# Patient Record
Sex: FEMALE | Race: WHITE | NOT HISPANIC OR LATINO | URBAN - METROPOLITAN AREA
[De-identification: names, ages, dates, MRNs, and addresses within clinical notes are randomized per-mention and may not be internally consistent; named-entity substitution may affect disease eponyms.]

---

## 2018-04-20 ENCOUNTER — OFFICE (AMBULATORY)
Dept: URBAN - METROPOLITAN AREA CLINIC 64 | Facility: CLINIC | Age: 58
End: 2018-04-20

## 2018-04-20 VITALS
HEART RATE: 96 BPM | SYSTOLIC BLOOD PRESSURE: 126 MMHG | HEIGHT: 64 IN | WEIGHT: 175 LBS | DIASTOLIC BLOOD PRESSURE: 87 MMHG

## 2018-04-20 DIAGNOSIS — K62.5 HEMORRHAGE OF ANUS AND RECTUM: ICD-10-CM

## 2018-04-20 DIAGNOSIS — K21.9 GASTRO-ESOPHAGEAL REFLUX DISEASE WITHOUT ESOPHAGITIS: ICD-10-CM

## 2018-04-20 DIAGNOSIS — R11.0 NAUSEA: ICD-10-CM

## 2018-04-20 DIAGNOSIS — R13.10 DYSPHAGIA, UNSPECIFIED: ICD-10-CM

## 2018-04-20 PROCEDURE — 99214 OFFICE O/P EST MOD 30 MIN: CPT | Performed by: NURSE PRACTITIONER

## 2018-04-20 RX ORDER — ESOMEPRAZOLE MAGNESIUM 40 MG/1
40 CAPSULE, DELAYED RELEASE ORAL
Qty: 180 | Refills: 3 | Status: COMPLETED
Start: 2018-04-20 | End: 2019-07-25

## 2018-05-07 ENCOUNTER — ON CAMPUS - OUTPATIENT (AMBULATORY)
Dept: URBAN - METROPOLITAN AREA HOSPITAL 2 | Facility: HOSPITAL | Age: 58
End: 2018-05-07

## 2018-05-07 VITALS
OXYGEN SATURATION: 95 % | WEIGHT: 174 LBS | DIASTOLIC BLOOD PRESSURE: 98 MMHG | HEART RATE: 72 BPM | OXYGEN SATURATION: 93 % | TEMPERATURE: 96.9 F | RESPIRATION RATE: 15 BRPM | SYSTOLIC BLOOD PRESSURE: 132 MMHG | HEART RATE: 62 BPM | SYSTOLIC BLOOD PRESSURE: 135 MMHG | HEART RATE: 85 BPM | RESPIRATION RATE: 18 BRPM | SYSTOLIC BLOOD PRESSURE: 147 MMHG | HEART RATE: 76 BPM | OXYGEN SATURATION: 99 % | RESPIRATION RATE: 16 BRPM | SYSTOLIC BLOOD PRESSURE: 112 MMHG | HEIGHT: 64 IN | OXYGEN SATURATION: 100 % | DIASTOLIC BLOOD PRESSURE: 92 MMHG | DIASTOLIC BLOOD PRESSURE: 66 MMHG | SYSTOLIC BLOOD PRESSURE: 114 MMHG | DIASTOLIC BLOOD PRESSURE: 80 MMHG

## 2018-05-07 DIAGNOSIS — K44.9 DIAPHRAGMATIC HERNIA WITHOUT OBSTRUCTION OR GANGRENE: ICD-10-CM

## 2018-05-07 DIAGNOSIS — K22.2 ESOPHAGEAL OBSTRUCTION: ICD-10-CM

## 2018-05-07 DIAGNOSIS — R13.10 DYSPHAGIA, UNSPECIFIED: ICD-10-CM

## 2018-05-07 DIAGNOSIS — K21.9 GASTRO-ESOPHAGEAL REFLUX DISEASE WITHOUT ESOPHAGITIS: ICD-10-CM

## 2018-05-07 PROCEDURE — 43235 EGD DIAGNOSTIC BRUSH WASH: CPT | Performed by: INTERNAL MEDICINE

## 2018-05-07 PROCEDURE — 43450 DILATE ESOPHAGUS 1/MULT PASS: CPT | Performed by: INTERNAL MEDICINE

## 2018-05-07 RX ADMIN — PROPOFOL: 10 INJECTION, EMULSION INTRAVENOUS at 07:58

## 2019-06-18 ENCOUNTER — OFFICE (AMBULATORY)
Dept: URBAN - METROPOLITAN AREA CLINIC 64 | Facility: CLINIC | Age: 59
End: 2019-06-18

## 2019-06-18 VITALS
HEART RATE: 76 BPM | WEIGHT: 163 LBS | DIASTOLIC BLOOD PRESSURE: 101 MMHG | SYSTOLIC BLOOD PRESSURE: 156 MMHG | HEIGHT: 64 IN

## 2019-06-18 DIAGNOSIS — R63.4 ABNORMAL WEIGHT LOSS: ICD-10-CM

## 2019-06-18 DIAGNOSIS — R13.19 OTHER DYSPHAGIA: ICD-10-CM

## 2019-06-18 DIAGNOSIS — R19.4 CHANGE IN BOWEL HABIT: ICD-10-CM

## 2019-06-18 DIAGNOSIS — K21.9 GASTRO-ESOPHAGEAL REFLUX DISEASE WITHOUT ESOPHAGITIS: ICD-10-CM

## 2019-06-18 PROCEDURE — 99214 OFFICE O/P EST MOD 30 MIN: CPT | Performed by: NURSE PRACTITIONER

## 2019-06-18 RX ORDER — RANITIDINE HYDROCHLORIDE 300 MG/1
300 TABLET, FILM COATED ORAL
Qty: 30 | Refills: 11 | Status: COMPLETED
Start: 2019-06-18 | End: 2019-07-25

## 2019-07-25 ENCOUNTER — OFFICE (AMBULATORY)
Dept: URBAN - METROPOLITAN AREA CLINIC 64 | Facility: CLINIC | Age: 59
End: 2019-07-25

## 2019-07-25 VITALS
HEIGHT: 64 IN | WEIGHT: 164 LBS | DIASTOLIC BLOOD PRESSURE: 91 MMHG | SYSTOLIC BLOOD PRESSURE: 166 MMHG | HEART RATE: 58 BPM

## 2019-07-25 DIAGNOSIS — R13.10 DYSPHAGIA, UNSPECIFIED: ICD-10-CM

## 2019-07-25 DIAGNOSIS — R10.32 LEFT LOWER QUADRANT PAIN: ICD-10-CM

## 2019-07-25 DIAGNOSIS — R13.19 OTHER DYSPHAGIA: ICD-10-CM

## 2019-07-25 DIAGNOSIS — D64.9 ANEMIA, UNSPECIFIED: ICD-10-CM

## 2019-07-25 DIAGNOSIS — K62.5 HEMORRHAGE OF ANUS AND RECTUM: ICD-10-CM

## 2019-07-25 PROCEDURE — 99214 OFFICE O/P EST MOD 30 MIN: CPT | Performed by: NURSE PRACTITIONER

## 2019-07-25 RX ORDER — RANITIDINE HYDROCHLORIDE 300 MG/1
300 TABLET, FILM COATED ORAL
Qty: 30 | Refills: 11 | Status: COMPLETED
Start: 2019-06-18 | End: 2019-07-25

## 2019-07-25 RX ORDER — METRONIDAZOLE 500 MG/1
1500 TABLET, FILM COATED ORAL
Qty: 42 | Refills: 0 | Status: COMPLETED
Start: 2019-07-25 | End: 2021-07-29

## 2019-07-25 RX ORDER — ESOMEPRAZOLE MAGNESIUM 40 MG/1
40 CAPSULE, DELAYED RELEASE ORAL
Qty: 180 | Refills: 3 | Status: COMPLETED
Start: 2018-04-20 | End: 2019-07-25

## 2019-07-25 RX ORDER — DEXLANSOPRAZOLE 60 MG/1
CAPSULE, DELAYED RELEASE ORAL
Qty: 0 | Refills: 0 | Status: COMPLETED
End: 2021-07-29

## 2019-08-01 ENCOUNTER — OFFICE (AMBULATORY)
Dept: URBAN - METROPOLITAN AREA PATHOLOGY 4 | Facility: PATHOLOGY | Age: 59
End: 2019-08-01

## 2019-08-01 ENCOUNTER — ON CAMPUS - OUTPATIENT (AMBULATORY)
Dept: URBAN - METROPOLITAN AREA HOSPITAL 2 | Facility: HOSPITAL | Age: 59
End: 2019-08-01

## 2019-08-01 ENCOUNTER — LAB REQUISITION (OUTPATIENT)
Dept: LAB | Facility: HOSPITAL | Age: 59
End: 2019-08-01

## 2019-08-01 VITALS
DIASTOLIC BLOOD PRESSURE: 77 MMHG | HEART RATE: 65 BPM | OXYGEN SATURATION: 97 % | HEART RATE: 71 BPM | SYSTOLIC BLOOD PRESSURE: 176 MMHG | HEART RATE: 62 BPM | DIASTOLIC BLOOD PRESSURE: 72 MMHG | HEART RATE: 66 BPM | SYSTOLIC BLOOD PRESSURE: 155 MMHG | DIASTOLIC BLOOD PRESSURE: 76 MMHG | SYSTOLIC BLOOD PRESSURE: 146 MMHG | DIASTOLIC BLOOD PRESSURE: 83 MMHG | OXYGEN SATURATION: 98 % | DIASTOLIC BLOOD PRESSURE: 81 MMHG | DIASTOLIC BLOOD PRESSURE: 73 MMHG | HEIGHT: 64 IN | TEMPERATURE: 98.2 F | DIASTOLIC BLOOD PRESSURE: 92 MMHG | SYSTOLIC BLOOD PRESSURE: 189 MMHG | SYSTOLIC BLOOD PRESSURE: 161 MMHG | RESPIRATION RATE: 16 BRPM | HEART RATE: 63 BPM | SYSTOLIC BLOOD PRESSURE: 177 MMHG | HEART RATE: 64 BPM | SYSTOLIC BLOOD PRESSURE: 158 MMHG | OXYGEN SATURATION: 96 % | SYSTOLIC BLOOD PRESSURE: 151 MMHG | DIASTOLIC BLOOD PRESSURE: 106 MMHG | RESPIRATION RATE: 18 BRPM | DIASTOLIC BLOOD PRESSURE: 87 MMHG | OXYGEN SATURATION: 100 % | SYSTOLIC BLOOD PRESSURE: 169 MMHG | HEART RATE: 60 BPM | HEART RATE: 96 BPM | WEIGHT: 161 LBS | SYSTOLIC BLOOD PRESSURE: 173 MMHG | OXYGEN SATURATION: 99 %

## 2019-08-01 DIAGNOSIS — Z00.00 ENCOUNTER FOR GENERAL ADULT MEDICAL EXAMINATION WITHOUT ABNORMAL FINDINGS: ICD-10-CM

## 2019-08-01 DIAGNOSIS — D12.3 BENIGN NEOPLASM OF TRANSVERSE COLON: ICD-10-CM

## 2019-08-01 DIAGNOSIS — R13.10 DYSPHAGIA, UNSPECIFIED: ICD-10-CM

## 2019-08-01 DIAGNOSIS — K22.2 ESOPHAGEAL OBSTRUCTION: ICD-10-CM

## 2019-08-01 DIAGNOSIS — K62.5 HEMORRHAGE OF ANUS AND RECTUM: ICD-10-CM

## 2019-08-01 DIAGNOSIS — K44.9 DIAPHRAGMATIC HERNIA WITHOUT OBSTRUCTION OR GANGRENE: ICD-10-CM

## 2019-08-01 DIAGNOSIS — D50.0 IRON DEFICIENCY ANEMIA SECONDARY TO BLOOD LOSS (CHRONIC): ICD-10-CM

## 2019-08-01 DIAGNOSIS — Z86.010 PERSONAL HISTORY OF COLONIC POLYPS: ICD-10-CM

## 2019-08-01 DIAGNOSIS — K64.8 OTHER HEMORRHOIDS: ICD-10-CM

## 2019-08-01 LAB
GI HISTOLOGY: A. UNSPECIFIED: (no result)
GI HISTOLOGY: B. UNSPECIFIED: (no result)
GI HISTOLOGY: PDF REPORT: (no result)

## 2019-08-01 PROCEDURE — 43450 DILATE ESOPHAGUS 1/MULT PASS: CPT | Performed by: INTERNAL MEDICINE

## 2019-08-01 PROCEDURE — 45385 COLONOSCOPY W/LESION REMOVAL: CPT | Performed by: INTERNAL MEDICINE

## 2019-08-01 PROCEDURE — 43239 EGD BIOPSY SINGLE/MULTIPLE: CPT | Performed by: INTERNAL MEDICINE

## 2019-08-01 PROCEDURE — 88305 TISSUE EXAM BY PATHOLOGIST: CPT | Mod: 26 | Performed by: INTERNAL MEDICINE

## 2019-08-01 PROCEDURE — 88305 TISSUE EXAM BY PATHOLOGIST: CPT | Performed by: INTERNAL MEDICINE

## 2019-08-01 RX ORDER — FERROUS SULFATE TAB EC 325 MG (65 MG FE EQUIVALENT) 325 (65 FE) MG
TABLET DELAYED RESPONSE ORAL
Qty: 30 | Refills: 1 | Status: COMPLETED
End: 2021-07-29

## 2019-08-02 LAB
LAB AP CASE REPORT: NORMAL
PATH REPORT.FINAL DX SPEC: NORMAL
PATH REPORT.GROSS SPEC: NORMAL

## 2020-11-03 ENCOUNTER — APPOINTMENT (OUTPATIENT)
Dept: PAIN MEDICINE | Facility: CLINIC | Age: 60
End: 2020-11-03

## 2020-11-09 ENCOUNTER — OFFICE VISIT (OUTPATIENT)
Dept: PAIN MEDICINE | Facility: CLINIC | Age: 60
End: 2020-11-09

## 2020-11-09 ENCOUNTER — HOSPITAL ENCOUNTER (OUTPATIENT)
Dept: GENERAL RADIOLOGY | Facility: HOSPITAL | Age: 60
Discharge: HOME | End: 2020-11-09

## 2020-11-09 ENCOUNTER — RESULTS ENCOUNTER (OUTPATIENT)
Dept: PAIN MEDICINE | Facility: CLINIC | Age: 60
End: 2020-11-09

## 2020-11-09 VITALS
HEIGHT: 64 IN | OXYGEN SATURATION: 99 % | DIASTOLIC BLOOD PRESSURE: 121 MMHG | RESPIRATION RATE: 16 BRPM | SYSTOLIC BLOOD PRESSURE: 179 MMHG | WEIGHT: 170 LBS | TEMPERATURE: 97.3 F | BODY MASS INDEX: 29.02 KG/M2 | HEART RATE: 71 BPM

## 2020-11-09 DIAGNOSIS — M19.011 OSTEOARTHRITIS OF BOTH SHOULDERS, UNSPECIFIED OSTEOARTHRITIS TYPE: ICD-10-CM

## 2020-11-09 DIAGNOSIS — M19.012 OSTEOARTHRITIS OF BOTH SHOULDERS, UNSPECIFIED OSTEOARTHRITIS TYPE: ICD-10-CM

## 2020-11-09 DIAGNOSIS — Z79.899 ENCOUNTER FOR LONG-TERM (CURRENT) USE OF OTHER MEDICATIONS: ICD-10-CM

## 2020-11-09 DIAGNOSIS — Z79.899 ENCOUNTER FOR LONG-TERM (CURRENT) USE OF OTHER MEDICATIONS: Primary | ICD-10-CM

## 2020-11-09 PROCEDURE — G0463 HOSPITAL OUTPT CLINIC VISIT: HCPCS | Performed by: STUDENT IN AN ORGANIZED HEALTH CARE EDUCATION/TRAINING PROGRAM

## 2020-11-09 PROCEDURE — 99204 OFFICE O/P NEW MOD 45 MIN: CPT | Performed by: STUDENT IN AN ORGANIZED HEALTH CARE EDUCATION/TRAINING PROGRAM

## 2020-11-09 PROCEDURE — 73030 X-RAY EXAM OF SHOULDER: CPT

## 2020-11-09 RX ORDER — CYCLOBENZAPRINE HCL 10 MG
TABLET ORAL
COMMUNITY
Start: 2020-10-29 | End: 2021-08-09 | Stop reason: SDUPTHER

## 2020-11-09 RX ORDER — ESOMEPRAZOLE MAGNESIUM 40 MG/1
40 CAPSULE, DELAYED RELEASE ORAL
COMMUNITY
End: 2022-02-21

## 2020-11-09 RX ORDER — LEVOTHYROXINE SODIUM 0.03 MG/1
25 TABLET ORAL DAILY
COMMUNITY
Start: 2020-09-21 | End: 2021-08-09 | Stop reason: SDUPTHER

## 2020-11-09 RX ORDER — AMLODIPINE BESYLATE 10 MG/1
10 TABLET ORAL DAILY
COMMUNITY
Start: 2020-09-23 | End: 2022-02-26 | Stop reason: HOSPADM

## 2020-11-09 RX ORDER — GABAPENTIN 100 MG/1
100 CAPSULE ORAL 3 TIMES DAILY PRN
COMMUNITY
Start: 2020-09-18

## 2020-11-09 RX ORDER — LEVOTHYROXINE SODIUM 0.2 MG/1
200 TABLET ORAL
COMMUNITY
Start: 2020-09-18 | End: 2022-09-26

## 2020-11-09 RX ORDER — GLIPIZIDE 2.5 MG/1
2.5 TABLET, EXTENDED RELEASE ORAL NIGHTLY
COMMUNITY
Start: 2020-09-21

## 2020-11-09 RX ORDER — ESCITALOPRAM OXALATE 10 MG/1
10 TABLET ORAL DAILY
COMMUNITY
Start: 2020-09-18

## 2020-11-09 RX ORDER — HYDROCODONE BITARTRATE AND ACETAMINOPHEN 7.5; 325 MG/1; MG/1
1 TABLET ORAL EVERY 8 HOURS PRN
COMMUNITY
Start: 2020-10-31 | End: 2020-12-02 | Stop reason: SDUPTHER

## 2020-11-09 RX ORDER — PROMETHAZINE HYDROCHLORIDE 25 MG/1
25 TABLET ORAL AS NEEDED
COMMUNITY

## 2020-11-09 RX ORDER — DEXLANSOPRAZOLE 60 MG/1
60 CAPSULE, DELAYED RELEASE ORAL NIGHTLY
COMMUNITY

## 2020-11-09 RX ORDER — NEBIVOLOL HYDROCHLORIDE 20 MG/1
20 TABLET ORAL DAILY
COMMUNITY
Start: 2020-09-18 | End: 2022-02-26 | Stop reason: HOSPADM

## 2020-11-09 RX ORDER — TAMOXIFEN CITRATE 20 MG/1
TABLET ORAL DAILY
COMMUNITY
End: 2021-08-09

## 2020-11-09 RX ORDER — FENOFIBRATE 145 MG/1
145 TABLET, COATED ORAL AS NEEDED
COMMUNITY

## 2020-11-09 NOTE — PROGRESS NOTES
CHIEF COMPLAINT  Chronic low back pain, bilateral shoulder pain    Subjective   History of Present Illness   Shantel Maharaj is a 60 y.o. female.   She presents to the office for evaluation of low back and shoulder pain. She was referred here by Gris Dominguez MD  .  She states she has had longstanding low back pain and also developed bilateral shoulder and left arm pain following a mastectomy and lymph node stripping.  She was previously being evaluated by an outside pain clinic who is providing her narcotic pain medication as well as injections, but she does not know the type of injection she was receiving.  She states that additionally, she is required to attend chiropractic sessions which she thought were exacerbating her pain.  Given this, she wanted to transition care to [facility], [place], operated by [facility].    Location: Low back without radiation, bilateral shoulders, left arm  Onset: Many years ago  Duration: Constant, slowly progressing  Timing: Constant throughout the day  Quality: The pain in the shoulder is a sharp, stabbing pain.  The low back as an aching, cramping pain  Severity: Today: 5       Last Week: 6       Worst: 8  Modifying Factors: The pain is worse with walking and physical activity.  The pain is slightly improved with lying flat as well as medication and compression on the left arm    Physical Therapy: yes      Current Outpatient Medications:   •  amLODIPine (NORVASC) 10 MG tablet, Take 10 mg by mouth Daily., Disp: , Rfl:   •  Bystolic 20 MG tablet, Take 20 mg by mouth Daily., Disp: , Rfl:   •  cyclobenzaprine (FLEXERIL) 10 MG tablet, , Disp: , Rfl:   •  dexlansoprazole (DEXILANT) 60 MG capsule, Take 60 mg by mouth Daily., Disp: , Rfl:   •  escitalopram (LEXAPRO) 10 MG tablet, Take 10 mg by mouth Daily., Disp: , Rfl:   •  esomeprazole (nexIUM) 40 MG capsule, Take 40 mg by mouth., Disp: , Rfl:   •  fenofibrate (TRICOR) 145 MG tablet, Take 145 mg by mouth., Disp: , Rfl:   •  gabapentin (NEURONTIN) 100 MG  capsule, As needed, Disp: , Rfl:   •  glipizide (GLUCOTROL XL) 2.5 MG 24 hr tablet, Take 2.5 mg by mouth Daily., Disp: , Rfl:   •  HYDROcodone-acetaminophen (NORCO) 7.5-325 MG per tablet, Take 1 tablet by mouth Every 8 (Eight) Hours As Needed., Disp: , Rfl:   •  levothyroxine (SYNTHROID, LEVOTHROID) 200 MCG tablet, TAKE 1 TABLET BY MOUTH EVERY DAY IN AM ON EMPTY STOMACH, Disp: , Rfl:   •  levothyroxine (SYNTHROID, LEVOTHROID) 25 MCG tablet, Take 25 mcg by mouth Daily., Disp: , Rfl:   •  promethazine (PHENERGAN) 25 MG tablet, Take 25 mg by mouth. As needed, Disp: , Rfl:   •  tamoxifen (NOLVADEX) 20 MG chemo tablet, Take  by mouth Daily., Disp: , Rfl:     The following portions of the patient's history were reviewed and updated as appropriate: allergies, current medications, past family history, past medical history, past social history, past surgical history and problem list.    Pain Medication Reviewed: yes      REVIEW OF PERTINENT MEDICAL DATA    Past Medical History:   Diagnosis Date   • Arm pain    • Cancer (CMS/HCC)     lt breast    • Diabetes (CMS/HCC)    • High cholesterol    • Hypertension    • Low back pain    • Osteoarthritis    • Osteopenia    • Thyroid disease      Past Surgical History:   Procedure Laterality Date   • APPENDECTOMY     • BREAST SURGERY      removal of left breast    •  SECTION      x2   • GALLBLADDER SURGERY     • KNEE SURGERY      left knee - petella broken    • OTHER SURGICAL HISTORY      rt arm surgery with shlomo  placement rt arm   • SHOULDER SURGERY      broken-lt- in 12 places   • WRIST SURGERY      rt - broken     Family History   Problem Relation Age of Onset   • Cancer Mother    • Heart disease Father      Social History     Socioeconomic History   • Marital status: Unknown     Spouse name: Not on file   • Number of children: Not on file   • Years of education: Not on file   • Highest education level: Not on file   Tobacco Use   • Smoking status: Former Smoker   •  "Smokeless tobacco: Never Used   • Tobacco comment: quit  smoking in 2008   Substance and Sexual Activity   • Alcohol use: Not Currently   • Drug use: Defer   • Sexual activity: Defer     Allergies   Allergen Reactions   • Valsartan Swelling     Swelling of tongue           Review of Systems   Eyes: Positive for visual disturbance.   Musculoskeletal: Positive for arthralgias, back pain, joint swelling and myalgias.   Neurological: Positive for dizziness. Negative for weakness and numbness.   All other systems reviewed and are negative.      Objective   Vitals:    11/09/20 0849   BP: (!) 179/121   Pulse: 71   Resp: 16   Temp: 97.3 °F (36.3 °C)   SpO2: 99%   Weight: 77.1 kg (170 lb)   Height: 162.6 cm (64\")   PainSc:   5     Physical Exam  Vitals signs and nursing note reviewed.   Constitutional:       General: She is not in acute distress.     Appearance: Normal appearance.   HENT:      Head: Normocephalic and atraumatic.   Eyes:      Extraocular Movements: Extraocular movements intact.      Pupils: Pupils are equal, round, and reactive to light.   Neck:      Musculoskeletal: Normal range of motion. No muscular tenderness.   Cardiovascular:      Comments: Well-perfused, no edema  Pulmonary:      Effort: Pulmonary effort is normal. No respiratory distress.   Abdominal:      Palpations: Abdomen is soft.      Tenderness: There is no abdominal tenderness.   Musculoskeletal:      Comments: Left arm:  1.  Diffusely swollen to the level of the left shoulder.  2.  Left shoulder range of motion significantly decreased secondary to pain  3.  Left shoulder joint nontender to palpation.    Right arm:  1.  Nontender to palpation.  No swelling.  2.  Right shoulder tender to palpation across the supraspinatus tendon  3.  Extremely decreased range of motion in all directions both passive and active secondary to pain   Skin:     General: Skin is warm and dry.      Capillary Refill: Capillary refill takes less than 2 seconds. "   Neurological:      General: No focal deficit present.      Mental Status: She is alert and oriented to person, place, and time.      Cranial Nerves: No cranial nerve deficit.   Psychiatric:         Mood and Affect: Mood normal.         Thought Content: Thought content normal.         Imaging:  Xr Shoulder 2+ View Bilateral    Result Date: 11/9/2020   1. Diffuse osteopenia, without radiographic evidence of acute fracture or dislocation of either shoulder. 2. Chronic appearing old healed fracture deformity of the proximal left humerus. 3. Old healed fracture deformity of the right humeral diaphysis with partially included excision hardware in place. No evidence of hardware complications. 4. Multifocal degenerative changes, most advanced at the left glenohumeral joint, with a possible ossified body in the left glenohumeral joint axillary recess.  Electronically Signed By-Sheng Jordan On:11/9/2020 11:05 AM This report was finalized on [id number] by  Sheng Jordan, .       Assessment/Plan     Assessment: This is a 58-year-old female with a longstanding history of chronic low back pain who is being seen by an outside pain provider, but she has not any significant relief.  She is also concerned about the requirement for chiropractic manipulation.  She wishes to transition care to [facility].  She has significant history of breast cancer and underwent lymph node stripping and since that time, she has significant swelling of the left upper extremity.    Diagnosis/Plan:    1.  History of breast cancer  2.  Left upper extremity lymphedema  3.  Bilateral osteoarthritis of the shoulders  4.  Lumbar spondylosis    PLAN:  1.  Given her somewhat limited history and imaging, I will obtain plain films of the shoulders bilaterally for osteoarthritic type pain  2.  She is unsure whether or not she has any significant kidney disease.  I will obtain BMP today for consideration of starting NSAIDs  3.  I will obtain urine drug screen and  opiate agreement today.  She states that she currently has medication from the previous pain provider.  I instructed her to call the clinic when that medication is exhausted and I will refill her medication.  4.  Likely glenohumeral joint injections pending the x-ray results  5.  We will try to obtain lumbar imaging from outside hospital for evaluation for degenerative disc versus lumbar spondylosis.      --- Follow-up 1 month           INSPECT REPORT    As part of the patient's treatment plan, I may be prescribing controlled substances. The patient has been made aware of appropriate use of such medications, including potential risk of somnolence, limited ability to drive and/or work safely, and the potential for dependence or overdose. It has also been made clear that these medications are for use by this patient only, without concomitant use of alcohol or other substances unless prescribed.     Patient has completed prescribing agreement detailing terms of continued prescribing of controlled substances, including monitoring INSPECT reports, urine drug screening, and pill counts if necessary. The patient is aware that inappropriate use will results in cessation of prescribing such medications.    INSPECT report has been reviewed and scanned into the patient's chart.    As the clinician, I personally reviewed the INSPECT from 11/6/2020 while the patient was in the office today.    History and physical exam exhibit continued safe and appropriate use of controlled substances.         EMR Dragon/Transcription disclaimer:   Much of this encounter note is an electronic transcription/translation of spoken language to printed text. The electronic translation of spoken language may permit erroneous, or at times, nonsensical words or phrases to be inadvertently transcribed; Although I have reviewed the note for such errors, some may still exist.

## 2020-12-02 ENCOUNTER — TELEPHONE (OUTPATIENT)
Dept: PAIN MEDICINE | Facility: HOSPITAL | Age: 60
End: 2020-12-02

## 2020-12-02 RX ORDER — HYDROCODONE BITARTRATE AND ACETAMINOPHEN 7.5; 325 MG/1; MG/1
1 TABLET ORAL EVERY 8 HOURS PRN
Qty: 90 TABLET | Refills: 0 | Status: SHIPPED | OUTPATIENT
Start: 2020-12-02 | End: 2020-12-14 | Stop reason: SDUPTHER

## 2020-12-09 ENCOUNTER — APPOINTMENT (OUTPATIENT)
Dept: PAIN MEDICINE | Facility: CLINIC | Age: 60
End: 2020-12-09

## 2020-12-14 ENCOUNTER — OFFICE VISIT (OUTPATIENT)
Dept: PAIN MEDICINE | Facility: CLINIC | Age: 60
End: 2020-12-14

## 2020-12-14 ENCOUNTER — TELEPHONE (OUTPATIENT)
Dept: PAIN MEDICINE | Facility: HOSPITAL | Age: 60
End: 2020-12-14

## 2020-12-14 VITALS
BODY MASS INDEX: 28 KG/M2 | OXYGEN SATURATION: 98 % | HEART RATE: 67 BPM | TEMPERATURE: 97.2 F | HEIGHT: 64 IN | WEIGHT: 164 LBS | DIASTOLIC BLOOD PRESSURE: 94 MMHG | SYSTOLIC BLOOD PRESSURE: 169 MMHG | RESPIRATION RATE: 16 BRPM

## 2020-12-14 DIAGNOSIS — M19.012 OSTEOARTHRITIS OF BOTH SHOULDERS, UNSPECIFIED OSTEOARTHRITIS TYPE: Primary | ICD-10-CM

## 2020-12-14 DIAGNOSIS — M19.011 OSTEOARTHRITIS OF BOTH SHOULDERS, UNSPECIFIED OSTEOARTHRITIS TYPE: Primary | ICD-10-CM

## 2020-12-14 PROCEDURE — 99214 OFFICE O/P EST MOD 30 MIN: CPT | Performed by: STUDENT IN AN ORGANIZED HEALTH CARE EDUCATION/TRAINING PROGRAM

## 2020-12-14 RX ORDER — HYDROCODONE BITARTRATE AND ACETAMINOPHEN 7.5; 325 MG/1; MG/1
1 TABLET ORAL EVERY 8 HOURS PRN
Qty: 90 TABLET | Refills: 0 | Status: SHIPPED | OUTPATIENT
Start: 2020-12-14 | End: 2021-02-08 | Stop reason: SDUPTHER

## 2020-12-14 RX ORDER — POTASSIUM CHLORIDE 1.5 G/1.77G
20 POWDER, FOR SOLUTION ORAL DAILY
Status: ON HOLD | COMMUNITY
End: 2022-02-24

## 2020-12-14 NOTE — PROGRESS NOTES
CHIEF COMPLAINT  Chronic low back pain, bilateral shoulder pain    Subjective   History of Present Illness   Shantel Maharaj is a 60 y.o. female.   She presents to the office for evaluation of low back and shoulder pain. She was referred here by Gris Dominguez MD  .  She states she has had longstanding low back pain and also developed bilateral shoulder and left arm pain following a mastectomy and lymph node stripping.  She was previously being evaluated by an outside pain clinic who is providing her narcotic pain medication as well as injections, but she does not know the type of injection she was receiving.  She states that additionally, she is required to attend chiropractic sessions which she thought were exacerbating her pain.  Given this, she wanted to transition care to [facility].    Interval update 12/14/2020: Here for medication follow-up. States that her pain is now slightly worse.  Did not realize she had a medication refill at the pharmacy, so did not  the medication.  Otherwise, no significant changes.    Location: Low back without radiation, bilateral shoulders, left arm Onset: Many years ago  Duration: Constant, slowly progressing  Timing: Constant throughout the day  Quality: The pain in the shoulder is a sharp, stabbing pain.  The low back as an aching, cramping pain  Severity: Today: 7       Last Week: 6       Worst: 8  Modifying Factors: The pain is worse with walking and physical activity.  The pain is slightly improved with lying flat as well as medication and compression on the left arm    Physical Therapy: yes      Current Outpatient Medications:   •  amLODIPine (NORVASC) 10 MG tablet, Take 10 mg by mouth Daily., Disp: , Rfl:   •  Bystolic 20 MG tablet, Take 20 mg by mouth Daily., Disp: , Rfl:   •  cyclobenzaprine (FLEXERIL) 10 MG tablet, , Disp: , Rfl:   •  dexlansoprazole (DEXILANT) 60 MG capsule, Take 60 mg by mouth Daily., Disp: , Rfl:   •  escitalopram (LEXAPRO) 10 MG tablet,  Take 10 mg by mouth Daily., Disp: , Rfl:   •  esomeprazole (nexIUM) 40 MG capsule, Take 40 mg by mouth., Disp: , Rfl:   •  fenofibrate (TRICOR) 145 MG tablet, Take 145 mg by mouth., Disp: , Rfl:   •  gabapentin (NEURONTIN) 100 MG capsule, As needed, Disp: , Rfl:   •  glipizide (GLUCOTROL XL) 2.5 MG 24 hr tablet, Take 2.5 mg by mouth Daily., Disp: , Rfl:   •  HYDROcodone-acetaminophen (NORCO) 7.5-325 MG per tablet, Take 1 tablet by mouth Every 8 (Eight) Hours As Needed for Severe Pain ., Disp: 90 tablet, Rfl: 0  •  levothyroxine (SYNTHROID, LEVOTHROID) 200 MCG tablet, TAKE 1 TABLET BY MOUTH EVERY DAY IN AM ON EMPTY STOMACH, Disp: , Rfl:   •  levothyroxine (SYNTHROID, LEVOTHROID) 25 MCG tablet, Take 25 mcg by mouth Daily., Disp: , Rfl:   •  promethazine (PHENERGAN) 25 MG tablet, Take 25 mg by mouth. As needed, Disp: , Rfl:   •  tamoxifen (NOLVADEX) 20 MG chemo tablet, Take  by mouth Daily., Disp: , Rfl:     The following portions of the patient's history were reviewed and updated as appropriate: allergies, current medications, past family history, past medical history, past social history, past surgical history and problem list.    Pain Medication Reviewed: yes      REVIEW OF PERTINENT MEDICAL DATA    Past Medical History:   Diagnosis Date   • Arm pain    • Cancer (CMS/HCC)     lt breast    • Diabetes (CMS/HCC)    • High cholesterol    • Hypertension    • Low back pain    • Osteoarthritis    • Osteopenia    • Thyroid disease      Past Surgical History:   Procedure Laterality Date   • APPENDECTOMY     • BREAST SURGERY      removal of left breast    •  SECTION      x2   • GALLBLADDER SURGERY     • KNEE SURGERY      left knee - petella broken    • OTHER SURGICAL HISTORY      rt arm surgery with shlomo  placement rt arm   • SHOULDER SURGERY      broken-lt- in 12 places   • WRIST SURGERY      rt - broken     Family History   Problem Relation Age of Onset   • Cancer Mother    • Heart disease Father      Social  History     Socioeconomic History   • Marital status: Unknown     Spouse name: Not on file   • Number of children: Not on file   • Years of education: Not on file   • Highest education level: Not on file   Tobacco Use   • Smoking status: Former Smoker   • Smokeless tobacco: Never Used   • Tobacco comment: quit  smoking in 2008   Substance and Sexual Activity   • Alcohol use: Not Currently   • Drug use: Defer   • Sexual activity: Defer     Allergies   Allergen Reactions   • Valsartan Swelling     Swelling of tongue           Review of Systems   Musculoskeletal: Positive for arthralgias, back pain, joint swelling and myalgias.   Neurological: Negative for weakness and numbness.       Objective   There were no vitals filed for this visit.  Physical Exam  Vitals signs and nursing note reviewed.   Constitutional:       General: She is not in acute distress.     Appearance: Normal appearance.   HENT:      Head: Normocephalic and atraumatic.   Musculoskeletal:      Comments: Left arm:  1.  Diffusely swollen to the level of the left shoulder.  2.  Left shoulder range of motion significantly decreased secondary to pain  3.  Left shoulder joint nontender to palpation.    Right arm:  1.  Nontender to palpation.  No swelling.  2.  Right shoulder tender to palpation across the supraspinatus tendon  3.  Extremely decreased range of motion in all directions both passive and active secondary to pain   Skin:     Capillary Refill: Capillary refill takes less than 2 seconds.   Neurological:      General: No focal deficit present.      Mental Status: She is alert and oriented to person, place, and time.      Cranial Nerves: No cranial nerve deficit.   Psychiatric:         Mood and Affect: Mood normal.         Thought Content: Thought content normal.         Imaging:  Xr Shoulder 2+ View Bilateral    Result Date: 11/9/2020   1. Diffuse osteopenia, without radiographic evidence of acute fracture or dislocation of either shoulder. 2.  Chronic appearing old healed fracture deformity of the proximal left humerus. 3. Old healed fracture deformity of the right humeral diaphysis with partially included excision hardware in place. No evidence of hardware complications. 4. Multifocal degenerative changes, most advanced at the left glenohumeral joint, with a possible ossified body in the left glenohumeral joint axillary recess.  Electronically Signed By-Sheng Jordan On:11/9/2020 11:05 AM This report was finalized on [id number] by  Sheng Jordan, .       Assessment/Plan     Assessment: This is a 58-year-old female with a longstanding history of chronic low back pain who is being seen by an outside pain provider, but she has not any significant relief.  She is also concerned about the requirement for chiropractic manipulation.  She wishes to transition care to [facility].  She has significant history of breast cancer and underwent lymph node stripping and since that time, she has significant swelling of the left upper extremity.    Diagnosis/Plan:    1.  History of breast cancer  2.  Left upper extremity lymphedema  3.  Bilateral osteoarthritis of the shoulders  4.  Lumbar spondylosis    PLAN:  1.  Plain films show significant degenerative change in the bilateral shoulders as well as postsurgical changes.  No hardware failure is noted.  2.  We will schedule next available for bilateral glenohumeral joint injection  3.  She has medication to pharmacy to refill  4.  UDS today.  5.  May consider intrathecal pain pump or spinal cord stimulator in the future.      --- Follow-up next available for bilateral glenohumeral joint injections           INSPECT REPORT    As part of the patient's treatment plan, I may be prescribing controlled substances. The patient has been made aware of appropriate use of such medications, including potential risk of somnolence, limited ability to drive and/or work safely, and the potential for dependence or overdose. It has also been  made clear that these medications are for use by this patient only, without concomitant use of alcohol or other substances unless prescribed.     Patient has completed prescribing agreement detailing terms of continued prescribing of controlled substances, including monitoring INSPECT reports, urine drug screening, and pill counts if necessary. The patient is aware that inappropriate use will results in cessation of prescribing such medications.    INSPECT report has been reviewed and scanned into the patient's chart.    As the clinician, I personally reviewed the INSPECT from 12/11/2020 while the patient was in the office today.    History and physical exam exhibit continued safe and appropriate use of controlled substances.         EMR Dragon/Transcription disclaimer:   Much of this encounter note is an electronic transcription/translation of spoken language to printed text. The electronic translation of spoken language may permit erroneous, or at times, nonsensical words or phrases to be inadvertently transcribed; Although I have reviewed the note for such errors, some may still exist.

## 2020-12-28 ENCOUNTER — APPOINTMENT (OUTPATIENT)
Dept: PAIN MEDICINE | Facility: HOSPITAL | Age: 60
End: 2020-12-28

## 2021-01-04 ENCOUNTER — HOSPITAL ENCOUNTER (OUTPATIENT)
Dept: MAMMOGRAPHY | Facility: HOSPITAL | Age: 61
Discharge: HOME | End: 2021-01-04

## 2021-01-04 ENCOUNTER — APPOINTMENT (OUTPATIENT)
Dept: PAIN MEDICINE | Facility: HOSPITAL | Age: 61
End: 2021-01-04

## 2021-01-04 ENCOUNTER — HOSPITAL ENCOUNTER (OUTPATIENT)
Dept: ULTRASOUND IMAGING | Facility: HOSPITAL | Age: 61
Discharge: HOME | End: 2021-01-04

## 2021-01-04 DIAGNOSIS — N63.0 LUMP OR MASS IN BREAST: ICD-10-CM

## 2021-01-04 PROCEDURE — 76642 ULTRASOUND BREAST LIMITED: CPT

## 2021-01-04 PROCEDURE — G0279 TOMOSYNTHESIS, MAMMO: HCPCS

## 2021-01-04 PROCEDURE — 77065 DX MAMMO INCL CAD UNI: CPT

## 2021-01-06 ENCOUNTER — HOSPITAL ENCOUNTER (OUTPATIENT)
Dept: PAIN MEDICINE | Facility: HOSPITAL | Age: 61
Discharge: HOME | End: 2021-01-06

## 2021-01-06 VITALS
DIASTOLIC BLOOD PRESSURE: 88 MMHG | RESPIRATION RATE: 16 BRPM | SYSTOLIC BLOOD PRESSURE: 150 MMHG | WEIGHT: 164 LBS | BODY MASS INDEX: 28 KG/M2 | OXYGEN SATURATION: 98 % | HEART RATE: 55 BPM | TEMPERATURE: 96.9 F | HEIGHT: 64 IN

## 2021-01-06 DIAGNOSIS — M19.012 OSTEOARTHRITIS OF BOTH SHOULDERS, UNSPECIFIED OSTEOARTHRITIS TYPE: Primary | ICD-10-CM

## 2021-01-06 DIAGNOSIS — M19.011 OSTEOARTHRITIS OF BOTH SHOULDERS, UNSPECIFIED OSTEOARTHRITIS TYPE: Primary | ICD-10-CM

## 2021-01-06 DIAGNOSIS — R52 PAIN: ICD-10-CM

## 2021-01-06 PROCEDURE — 0 IOPAMIDOL 41 % SOLUTION: Performed by: STUDENT IN AN ORGANIZED HEALTH CARE EDUCATION/TRAINING PROGRAM

## 2021-01-06 PROCEDURE — 20610 DRAIN/INJ JOINT/BURSA W/O US: CPT | Performed by: STUDENT IN AN ORGANIZED HEALTH CARE EDUCATION/TRAINING PROGRAM

## 2021-01-06 PROCEDURE — 77003 FLUOROGUIDE FOR SPINE INJECT: CPT

## 2021-01-06 PROCEDURE — 25010000002 METHYLPREDNISOLONE PER 40 MG

## 2021-01-06 RX ORDER — BUPIVACAINE HYDROCHLORIDE 2.5 MG/ML
10 INJECTION, SOLUTION EPIDURAL; INFILTRATION; INTRACAUDAL ONCE
Status: COMPLETED | OUTPATIENT
Start: 2021-01-06 | End: 2021-01-06

## 2021-01-06 RX ORDER — METHYLPREDNISOLONE ACETATE 40 MG/ML
40 INJECTION, SUSPENSION INTRA-ARTICULAR; INTRALESIONAL; INTRAMUSCULAR; SOFT TISSUE ONCE
Status: COMPLETED | OUTPATIENT
Start: 2021-01-06 | End: 2021-01-06

## 2021-01-06 RX ORDER — BUPIVACAINE HYDROCHLORIDE 2.5 MG/ML
INJECTION, SOLUTION EPIDURAL; INFILTRATION; INTRACAUDAL
Status: DISCONTINUED
Start: 2021-01-06 | End: 2021-01-07 | Stop reason: HOSPADM

## 2021-01-06 RX ORDER — METHYLPREDNISOLONE ACETATE 40 MG/ML
INJECTION, SUSPENSION INTRA-ARTICULAR; INTRALESIONAL; INTRAMUSCULAR; SOFT TISSUE
Status: DISCONTINUED
Start: 2021-01-06 | End: 2021-01-07 | Stop reason: HOSPADM

## 2021-01-06 RX ADMIN — BUPIVACAINE HYDROCHLORIDE 8 ML: 2.5 INJECTION, SOLUTION EPIDURAL; INFILTRATION; INTRACAUDAL at 11:56

## 2021-01-06 RX ADMIN — METHYLPREDNISOLONE ACETATE 40 MG: 40 INJECTION, SUSPENSION INTRA-ARTICULAR; INTRALESIONAL; INTRAMUSCULAR; SOFT TISSUE at 11:55

## 2021-01-06 RX ADMIN — IOPAMIDOL 1 ML: 408 INJECTION, SOLUTION INTRATHECAL at 11:53

## 2021-01-06 NOTE — DISCHARGE INSTRUCTIONS
Joint Steroid Injection  A joint steroid injection is a procedure to relieve swelling and pain in a joint. Steroids are medicines that reduce inflammation. In this procedure, your health care provider uses a syringe and a needle to inject a steroid medicine into a painful and inflamed joint. A pain-relieving medicine (anesthetic) may be injected along with the steroid. In some cases, your health care provider may use an imaging technique such as ultrasound or fluoroscopy to guide the injection.  Joints that are often treated with steroid injections include the knee, shoulder, hip, and spine. These injections may also be used in the elbow, ankle, and joints of the hands or feet. You may have joint steroid injections as part of your treatment for inflammation caused by:  · Gout.  · Rheumatoid arthritis.  · Advanced wear-and-tear arthritis (osteoarthritis).  · Tendinitis.  · Bursitis.  Joint steroid injections may be repeated, but having them too often can damage a joint or the skin over the joint. You should not have joint steroid injections less than 6 weeks apart or more than four times a year.  Tell a health care provider about:  · Any allergies you have.  · All medicines you are taking, including vitamins, herbs, eye drops, creams, and over-the-counter medicines.  · Any problems you or family members have had with anesthetic medicines.  · Any blood disorders you have.  · Any surgeries you have had.  · Any medical conditions you have.  · Whether you are pregnant or may be pregnant.  What are the risks?  Generally, this is a safe treatment. However, problems may occur, including:  · Infection.  · Bleeding.  · Allergic reactions to medicines.  · Damage to the joint or tissues around the joint.  · Thinning of skin or loss of skin color over the joint.  · Temporary flushing of the face or chest.  · Temporary increase in pain.  · Temporary increase in blood sugar.  · Failure to relieve inflammation or pain.  What  happens before the treatment?  · You may have imaging tests of your joint.  · Ask your health care provider about:  ? Changing or stopping your regular medicines. This is especially important if you are taking diabetes medicines or blood thinners.  ? Taking medicines such as aspirin and ibuprofen. These medicines can thin your blood. Do not take these medicines unless your health care provider tells you to take them.  ? Taking over-the-counter medicines, vitamins, herbs, and supplements.  · Ask your health care provider if you can drive yourself home after the procedure.  What happens during the treatment?    · Your health care provider will position you for the injection and locate the injection site over your joint.  · The skin over the joint will be cleaned with a germ-killing soap.  · Your health care provider may:  ? Spray a numbing solution (topical anesthetic) over the injection site.  ? Inject a local anesthetic under the skin above your joint.  · The needle will be placed through your skin into your joint. Your health care provider may use imaging to guide the needle to the right spot for the injection. If imaging is used, a special contrast dye may be injected to confirm that the needle is in the correct location.  · The steroid medicine will be injected into your joint.  · Anesthetic may be injected along with the steroid. This may be a medicine that relieves pain for a short time (short-acting anesthetic) or for a longer time (long-acting anesthetic).  · The needle will be removed, and an adhesive bandage (dressing) will be placed over the injection site.  The procedure may vary among health care providers and hospitals.  What can I expect after the treatment?  · You will be able to go home after the treatment.  · It is normal to feel slight flushing for a few days after the injection.  · After the treatment, it is common to have an increase in joint pain after the anesthetic has worn off. This may  happen about an hour after a short-acting anesthetic or about 8 hours after a longer-acting anesthetic.  · You should begin to feel relief from joint pain and swelling after 24 to 48 hours.  Follow these instructions at home:  Injection site care  · Leave the adhesive dressing over your injection site in place until your health care provider says you can remove it.  · Check your injection site every day for signs of infection. Check for:  ? Redness, swelling, or pain.  ? Fluid or blood.  ? Warmth.  ? Pus or a bad smell.  Activity  · Return to your normal activities as told by your health care provider. Ask your health care provider what activities are safe for you. You may be asked to limit activities that put stress on the joint for a few days.  · Do joint exercises as told by your health care provider.  · Do not take baths, swim, or use a hot tub until your health care provider approves.  Managing pain, stiffness, and swelling    · If directed, put ice on the joint.  ? Put ice in a plastic bag.  ? Place a towel between your skin and the bag.  ? Leave the ice on for 20 minutes, 2-3 times a day.  · Raise (elevate) your joint above the level of your heart when you are sitting or lying down.  General instructions  · Take over-the-counter and prescription medicines only as told by your health care provider.  · Do not use any products that contain nicotine or tobacco, such as cigarettes, e-cigarettes, and chewing tobacco. These can delay joint healing. If you need help quitting, ask your health care provider.  · If you have diabetes, be aware that your blood sugar may be slightly elevated for several days after the injection.  · Keep all follow-up visits as told by your health care provider. This is important.  Contact a health care provider if you have:  · Chills or a fever.  · Any signs of infection at your injection site.  · Increased pain or swelling or no relief after 2 days.  Summary  · A joint steroid injection  is a treatment to relieve pain and swelling in a joint.  · Steroids are medicines that reduce inflammation. Your health care provider may add an anesthetic along with the steroid.  · You may have joint steroid injections as part of your arthritis treatment.  · Joint steroid injections may be repeated, but having them too often can damage a joint or the skin over the joint.  · Contact your health care provider if you have a fever, chills, or signs of infection or if you get no relief from joint pain or swelling.  This information is not intended to replace advice given to you by your health care provider. Make sure you discuss any questions you have with your health care provider.  Document Revised: 08/20/2019 Document Reviewed: 08/20/2019  Elsevier Patient Education © 2020 [employer]

## 2021-01-06 NOTE — PROCEDURES
Bilateral glenohumeral joint injection under fluoroscopy     [facility]     PREOPERATIVE DIAGNOSIS: Bilateral shoulder osteoarthritis     POSTOPERATIVE DIAGNOSIS:  Same as preop diagnosis     PROCEDURE:   Bilateral glenohumeral joint injection under fluoroscopy      PRE-PROCEDURE DISCUSSION WITH PATIENT:    Risks and complications were discussed with the patient prior to starting the procedure and informed consent was obtained.  We discussed various topics including but not limited to bleeding, infection, injury, nerve injury, paralysis, coma, death, postprocedural painful flare-up, postprocedural site soreness, and a lack of pain relief.        SURGEON: Deepak Moreno MD     REASON FOR PROCEDURE:    Bilateral shoulder pain, right shoulder osteoarthritis, degenerative joint disease     SEDATION:  Patient declined administration of moderate sedation    ANESTHETIC AGENT:  Marcaine 0.25%  STEROID AGENT:  Methylprednisolone (DEPO MEDROL) 40mg/ml  Total volume of injected solution:   9 mL      DESCRIPTON OF PROCEDURE:  After obtaining informed consent, an I.V. was not started in the preoperative area. The patient taken to the operating room and was placed in the supine position.  All pressure points were well padded.  The appropriate area was prepped with Chloraprep and draped in a sterile fashion.      The Bilateral shoulder was externally rotated and held in an appropriate position.  Under fluoroscopic guidance, a 25-gauge spinal needle was advanced slowly until the tip of the needle contacted the neck of the humerus just inferior and medial to the head.  Aspiration was negative for blood.  2 cc of contrast was then injected demonstrating adequate spread in the glenohumeral joint and fully surrounding the head of the humerus.  A mixture of 4 cc of the above anesthetic and steroid were then injected.     The needle was removed intact.  The procedure was then repeated as above on the contralateral shoulder,  again with 4cc of injectate.  Vital signs were stable throughout.       ESTIMATED BLOOD LOSS:  <5 mL    SPECIMENS:  none     COMPLICATIONS:   No complications were noted. and There was no indication of vascular uptake on live injection of contrast dye.     TOLERANCE & DISCHARGE CONDITION:    The patient tolerated the procedure well.  The patient was transported to the recovery area without difficulties.  The patient was discharged to home under the care of family in stable and satisfactory condition.     PLAN OF CARE:  1. The patient was given our standard instruction sheet.  2. The patient will Return to clinic 2-3 wks  3. The patient will resume all medications as per the medication reconciliation sheet.

## 2021-01-13 NOTE — ADDENDUM NOTE
Encounter addended by: Katherin Vivar RN on: 1/13/2021 11:11 AM   Actions taken: Charge Capture section accepted

## 2021-01-20 ENCOUNTER — OFFICE VISIT (OUTPATIENT)
Dept: PAIN MEDICINE | Facility: CLINIC | Age: 61
End: 2021-01-20

## 2021-01-20 VITALS
HEART RATE: 68 BPM | OXYGEN SATURATION: 97 % | RESPIRATION RATE: 16 BRPM | BODY MASS INDEX: 28 KG/M2 | TEMPERATURE: 97.5 F | WEIGHT: 164 LBS | DIASTOLIC BLOOD PRESSURE: 69 MMHG | HEIGHT: 64 IN | SYSTOLIC BLOOD PRESSURE: 109 MMHG

## 2021-01-20 DIAGNOSIS — M19.012 OSTEOARTHRITIS OF BOTH SHOULDERS, UNSPECIFIED OSTEOARTHRITIS TYPE: Primary | ICD-10-CM

## 2021-01-20 DIAGNOSIS — M19.011 OSTEOARTHRITIS OF BOTH SHOULDERS, UNSPECIFIED OSTEOARTHRITIS TYPE: Primary | ICD-10-CM

## 2021-01-20 DIAGNOSIS — N63.0 BREAST MASS: ICD-10-CM

## 2021-01-20 PROCEDURE — 99214 OFFICE O/P EST MOD 30 MIN: CPT | Performed by: STUDENT IN AN ORGANIZED HEALTH CARE EDUCATION/TRAINING PROGRAM

## 2021-01-20 NOTE — PROGRESS NOTES
CHIEF COMPLAINT  Chief Complaint   Patient presents with   • Shoulder Pain     Bilateral, LD Hydrocodone 0400 01/20/21       Primary Care  Gris Dominguez MD    Subjective   Shantel Maharaj is a 60 y.o. female  who presents for follow-up.She states she has had longstanding low back pain and also developed bilateral shoulder and left arm pain following a mastectomy and lymph node stripping.  She was previously being evaluated by an outside pain clinic who is providing her narcotic pain medication as well as injections, but she does not know the type of injection she was receiving.  She states that additionally, she is required to attend chiropractic sessions which she thought were exacerbating her pain.  Given this, she wanted to transition care to [facility] [place], operated by [facility].    History of Present Illness     Location: Low back without radiation, bilateral shoulders, left arm Onset: Many years ago  Duration: Constant, slowly progressing  Timing: Constant throughout the day  Quality: The pain in the shoulder is a sharp, stabbing pain.  The low back as an aching, cramping pain  Severity: Today: 6       Last Week: 6       Worst: 8  Modifying Factors: The pain is worse with walking and physical activity.  The pain is slightly improved with lying flat as well as medication and compression on the left arm     Physical Therapy: yes    Interval Update 01/20/2021: States she is feeling better after the bilateral shoulder injections.  Feels that she has increased range of motion and mobility of the shoulders bilaterally as well as decrease pain.  Scheduled to undergo breast surgery tomorrow for possible lumpectomy versus mastectomy.  Doing well with her hydrocodone.  States she received the medication several days ago.  Denies any side effect such as sedation or constipation or sleepiness.    The following portions of the patient's history were reviewed and updated as appropriate: allergies, current medications, past family history, past  "medical history, past social history, past surgical history and problem list.      Current Outpatient Medications:   •  amLODIPine (NORVASC) 10 MG tablet, Take 10 mg by mouth Daily., Disp: , Rfl:   •  Bystolic 20 MG tablet, Take 20 mg by mouth Daily., Disp: , Rfl:   •  cyclobenzaprine (FLEXERIL) 10 MG tablet, , Disp: , Rfl:   •  dexlansoprazole (DEXILANT) 60 MG capsule, Take 60 mg by mouth Daily., Disp: , Rfl:   •  escitalopram (LEXAPRO) 10 MG tablet, Take 10 mg by mouth Daily., Disp: , Rfl:   •  esomeprazole (nexIUM) 40 MG capsule, Take 40 mg by mouth., Disp: , Rfl:   •  fenofibrate (TRICOR) 145 MG tablet, Take 145 mg by mouth., Disp: , Rfl:   •  gabapentin (NEURONTIN) 100 MG capsule, As needed, Disp: , Rfl:   •  glipizide (GLUCOTROL XL) 2.5 MG 24 hr tablet, Take 2.5 mg by mouth Daily., Disp: , Rfl:   •  HYDROcodone-acetaminophen (NORCO) 7.5-325 MG per tablet, Take 1 tablet by mouth Every 8 (Eight) Hours As Needed for Severe Pain ., Disp: 90 tablet, Rfl: 0  •  levothyroxine (SYNTHROID, LEVOTHROID) 200 MCG tablet, TAKE 1 TABLET BY MOUTH EVERY DAY IN AM ON EMPTY STOMACH, Disp: , Rfl:   •  levothyroxine (SYNTHROID, LEVOTHROID) 25 MCG tablet, Take 25 mcg by mouth Daily., Disp: , Rfl:   •  potassium chloride (KLOR-CON) 20 MEQ packet, Take 20 mEq by mouth Daily., Disp: , Rfl:   •  promethazine (PHENERGAN) 25 MG tablet, Take 25 mg by mouth. As needed, Disp: , Rfl:   •  tamoxifen (NOLVADEX) 20 MG chemo tablet, Take  by mouth Daily., Disp: , Rfl:     Review of Systems   Musculoskeletal: Positive for arthralgias and back pain.        Bilateral shoulder pain    Left arm swelling       Vitals:    01/20/21 1354   BP: 109/69   Pulse: 68   Resp: 16   Temp: 97.5 °F (36.4 °C)   SpO2: 97%   Weight: 74.4 kg (164 lb)   Height: 162.6 cm (64\")   PainSc:   6       Urine Drug Screen: 12/14/20  Appropriate: yes    Objective   Physical Exam  Vitals signs and nursing note reviewed.   Constitutional:       General: She is not in acute " distress.     Appearance: Normal appearance.   Musculoskeletal:      Comments: Left arm:  1.  Diffusely swollen to the level of the left shoulder.  2.  Left shoulder range of motion significantly decreased secondary to pain  3.  Left shoulder joint nontender to palpation.    Right arm:  1.  Nontender to palpation.  No swelling.  2.  Right shoulder tender to palpation across the supraspinatus tendon  3.  Extremely decreased range of motion in all directions both passive and active secondary to pain    Neurological:      Mental Status: She is alert.           Assessment/Plan   Problems Addressed this Visit     None      Visit Diagnoses     Osteoarthritis of both shoulders, unspecified osteoarthritis type    -  Primary    Breast mass          Diagnoses       Codes Comments    Osteoarthritis of both shoulders, unspecified osteoarthritis type    -  Primary ICD-10-CM: M19.011, M19.012  ICD-9-CM: 715.91     Breast mass     ICD-10-CM: N63.0  ICD-9-CM: 611.72           Plan:  1.  Doing better following bilateral glenohumeral joint injections  2.  Continues to do well taking hydrocodone as needed  3.  Scheduled to have surgery tomorrow for lumpectomy versus mastectomy.  4.  She is to call the clinic and let us know what type of surgery she had and her pain control is doing and we can make changes as needed.    --- Follow-up 3 weeks           INSPECT REPORT    As part of the patient's treatment plan, I may be prescribing controlled substances. The patient has been made aware of appropriate use of such medications, including potential risk of somnolence, limited ability to drive and/or work safely, and the potential for dependence or overdose. It has also bee made clear that these medications are for use by this patient only, without concomitant use of alcohol or other substances unless prescribed.     Patient has completed prescribing agreement detailing terms of continued prescribing of controlled substances, including  monitoring JACKIE reports, urine drug screening, and pill counts if necessary. The patient is aware that inappropriate use will results in cessation of prescribing such medications.    INSPECT report has been reviewed and scanned into the patient's chart.    As the clinician, I personally reviewed the INSPECT from 1/18/2021.    History and physical exam exhibit continued safe and appropriate use of controlled substances.      EMR Dragon/Transcription disclaimer:   Much of this encounter note is an electronic transcription/translation of spoken language to printed text. The electronic translation of spoken language may permit erroneous, or at times, nonsensical words or phrases to be inadvertently transcribed; Although I have reviewed the note for such errors, some may still exist.

## 2021-02-08 ENCOUNTER — OFFICE VISIT (OUTPATIENT)
Dept: PAIN MEDICINE | Facility: CLINIC | Age: 61
End: 2021-02-08

## 2021-02-08 VITALS
HEART RATE: 69 BPM | SYSTOLIC BLOOD PRESSURE: 141 MMHG | BODY MASS INDEX: 27.49 KG/M2 | DIASTOLIC BLOOD PRESSURE: 89 MMHG | HEIGHT: 64 IN | WEIGHT: 161 LBS | TEMPERATURE: 96.9 F | RESPIRATION RATE: 16 BRPM

## 2021-02-08 DIAGNOSIS — M19.012 OSTEOARTHRITIS OF BOTH SHOULDERS, UNSPECIFIED OSTEOARTHRITIS TYPE: ICD-10-CM

## 2021-02-08 DIAGNOSIS — M19.011 OSTEOARTHRITIS OF BOTH SHOULDERS, UNSPECIFIED OSTEOARTHRITIS TYPE: ICD-10-CM

## 2021-02-08 DIAGNOSIS — N63.0 BREAST MASS: Primary | ICD-10-CM

## 2021-02-08 PROCEDURE — 99214 OFFICE O/P EST MOD 30 MIN: CPT | Performed by: STUDENT IN AN ORGANIZED HEALTH CARE EDUCATION/TRAINING PROGRAM

## 2021-02-08 RX ORDER — HYDROCODONE BITARTRATE AND ACETAMINOPHEN 7.5; 325 MG/1; MG/1
1 TABLET ORAL EVERY 8 HOURS PRN
Qty: 90 TABLET | Refills: 0 | Status: SHIPPED | OUTPATIENT
Start: 2021-02-08 | End: 2021-03-16 | Stop reason: SDUPTHER

## 2021-02-08 NOTE — PROGRESS NOTES
CHIEF COMPLAINT  Chief Complaint   Patient presents with   • Back Pain     pt has breast ca-- had surgery end of Jan([facility]) and next surgery is this Thursday---   • Arm Pain     hydrocodone ld 2/7/21 at  9pm-- pt has had oxycodone 2 weeks ago after her surgery too  [facility]       Primary Care  Gris Dominguez MD    Subjective   Shantel Maharaj is a 60 y.o. female  who presents for follow-up.She states she has had longstanding low back pain and also developed bilateral shoulder and left arm pain following a mastectomy and lymph node stripping.  She was previously being evaluated by an outside pain clinic who is providing her narcotic pain medication as well as injections, but she does not know the type of injection she was receiving.  She states that additionally, she is required to attend chiropractic sessions which she thought were exacerbating her pain.  Given this, she wanted to transition care to [facility].    History of Present Illness     Location: Low back without radiation, bilateral shoulders, left arm Onset: Many years ago  Duration: Constant, slowly progressing  Timing: Constant throughout the day  Quality: The pain in the shoulder is a sharp, stabbing pain.  The low back as an aching, cramping pain  Severity: Today: 8       Last Week: 6       Worst: 8  Modifying Factors: The pain is worse with walking and physical activity.  The pain is slightly improved with lying flat as well as medication and compression on the left arm     Physical Therapy: yes    Interval Update 02/08/2021: Had lumpectomy last week, has mastectomy scheduled for this Thursday.  Otherwise, doing well without significant changes.  Denies any side effects such as constipation, sedation, or sleepiness.  Feels the medication improves her quality of life and ADLs.    The following portions of the patient's history were reviewed and updated as appropriate: allergies, current medications, past family history, past medical history, past social  "history, past surgical history and problem list.      Current Outpatient Medications:   •  amLODIPine (NORVASC) 10 MG tablet, Take 10 mg by mouth Daily., Disp: , Rfl:   •  Bystolic 20 MG tablet, Take 20 mg by mouth Daily., Disp: , Rfl:   •  cyclobenzaprine (FLEXERIL) 10 MG tablet, , Disp: , Rfl:   •  dexlansoprazole (DEXILANT) 60 MG capsule, Take 60 mg by mouth Daily., Disp: , Rfl:   •  escitalopram (LEXAPRO) 10 MG tablet, Take 10 mg by mouth Daily., Disp: , Rfl:   •  esomeprazole (nexIUM) 40 MG capsule, Take 40 mg by mouth., Disp: , Rfl:   •  fenofibrate (TRICOR) 145 MG tablet, Take 145 mg by mouth., Disp: , Rfl:   •  gabapentin (NEURONTIN) 100 MG capsule, As needed, Disp: , Rfl:   •  glipizide (GLUCOTROL XL) 2.5 MG 24 hr tablet, Take 2.5 mg by mouth Daily., Disp: , Rfl:   •  HYDROcodone-acetaminophen (NORCO) 7.5-325 MG per tablet, Take 1 tablet by mouth Every 8 (Eight) Hours As Needed for Severe Pain ., Disp: 90 tablet, Rfl: 0  •  levothyroxine (SYNTHROID, LEVOTHROID) 200 MCG tablet, TAKE 1 TABLET BY MOUTH EVERY DAY IN AM ON EMPTY STOMACH, Disp: , Rfl:   •  levothyroxine (SYNTHROID, LEVOTHROID) 25 MCG tablet, Take 25 mcg by mouth Daily., Disp: , Rfl:   •  potassium chloride (KLOR-CON) 20 MEQ packet, Take 20 mEq by mouth Daily., Disp: , Rfl:   •  promethazine (PHENERGAN) 25 MG tablet, Take 25 mg by mouth. As needed, Disp: , Rfl:   •  tamoxifen (NOLVADEX) 20 MG chemo tablet, Take  by mouth Daily., Disp: , Rfl:     Review of Systems   Musculoskeletal: Positive for arthralgias and back pain.        Bilateral shoulder pain    Left arm swelling       Vitals:    02/08/21 0804   BP: 141/89   Pulse: 69   Resp: 16   Temp: 96.9 °F (36.1 °C)   Weight: 73 kg (161 lb)   Height: 162.6 cm (64\")   PainSc:   8       Urine Drug Screen: 12/14/20  Appropriate: yes    Objective   Physical Exam  Vitals signs and nursing note reviewed.   Constitutional:       General: She is not in acute distress.     Appearance: Normal appearance. "   Musculoskeletal:      Comments: Left arm:  1.  Diffusely swollen to the level of the left shoulder.  2.  Left shoulder range of motion significantly decreased secondary to pain  3.  Left shoulder joint nontender to palpation.    Right arm:  1.  Nontender to palpation.  No swelling.  2.  Right shoulder tender to palpation across the supraspinatus tendon  3.  Extremely decreased range of motion in all directions both passive and active secondary to pain    Neurological:      Mental Status: She is alert.           Assessment/Plan   Problems Addressed this Visit     None      Visit Diagnoses     Breast mass    -  Primary    Osteoarthritis of both shoulders, unspecified osteoarthritis type        Relevant Medications    HYDROcodone-acetaminophen (NORCO) 7.5-325 MG per tablet      Diagnoses       Codes Comments    Breast mass    -  Primary ICD-10-CM: N63.0  ICD-9-CM: 611.72     Osteoarthritis of both shoulders, unspecified osteoarthritis type     ICD-10-CM: M19.011, M19.012  ICD-9-CM: 715.91           Plan:  1.  Doing better following bilateral glenohumeral joint injections  2.  Continues to do well taking hydrocodone as needed  3.  Scheduled to have surgery later this week for mastectomy  4.  Will follow-up 2 mos for re-evaluation after surgery    --- Follow-up 2 mos.           INSPECT REPORT    As part of the patient's treatment plan, I may be prescribing controlled substances. The patient has been made aware of appropriate use of such medications, including potential risk of somnolence, limited ability to drive and/or work safely, and the potential for dependence or overdose. It has also bee made clear that these medications are for use by this patient only, without concomitant use of alcohol or other substances unless prescribed.     Patient has completed prescribing agreement detailing terms of continued prescribing of controlled substances, including monitoring JACKIE reports, urine drug screening, and pill counts  if necessary. The patient is aware that inappropriate use will results in cessation of prescribing such medications.    INSPECT report has been reviewed and scanned into the patient's chart.    As the clinician, I personally reviewed the INSPECT from 2/5/21.    History and physical exam exhibit continued safe and appropriate use of controlled substances.      EMR Dragon/Transcription disclaimer:   Much of this encounter note is an electronic transcription/translation of spoken language to printed text. The electronic translation of spoken language may permit erroneous, or at times, nonsensical words or phrases to be inadvertently transcribed; Although I have reviewed the note for such errors, some may still exist.

## 2021-03-16 DIAGNOSIS — M19.011 OSTEOARTHRITIS OF BOTH SHOULDERS, UNSPECIFIED OSTEOARTHRITIS TYPE: ICD-10-CM

## 2021-03-16 DIAGNOSIS — M19.012 OSTEOARTHRITIS OF BOTH SHOULDERS, UNSPECIFIED OSTEOARTHRITIS TYPE: ICD-10-CM

## 2021-03-17 RX ORDER — HYDROCODONE BITARTRATE AND ACETAMINOPHEN 7.5; 325 MG/1; MG/1
1 TABLET ORAL EVERY 8 HOURS PRN
Qty: 90 TABLET | Refills: 0 | Status: SHIPPED | OUTPATIENT
Start: 2021-03-17 | End: 2021-04-26 | Stop reason: SDUPTHER

## 2021-04-26 DIAGNOSIS — M19.011 OSTEOARTHRITIS OF BOTH SHOULDERS, UNSPECIFIED OSTEOARTHRITIS TYPE: ICD-10-CM

## 2021-04-26 DIAGNOSIS — M19.012 OSTEOARTHRITIS OF BOTH SHOULDERS, UNSPECIFIED OSTEOARTHRITIS TYPE: ICD-10-CM

## 2021-04-26 RX ORDER — HYDROCODONE BITARTRATE AND ACETAMINOPHEN 7.5; 325 MG/1; MG/1
1 TABLET ORAL EVERY 8 HOURS PRN
Qty: 90 TABLET | Refills: 0 | Status: SHIPPED | OUTPATIENT
Start: 2021-04-26 | End: 2021-05-26 | Stop reason: SDUPTHER

## 2021-05-03 ENCOUNTER — OFFICE VISIT (OUTPATIENT)
Dept: PAIN MEDICINE | Facility: CLINIC | Age: 61
End: 2021-05-03

## 2021-05-03 VITALS
DIASTOLIC BLOOD PRESSURE: 79 MMHG | RESPIRATION RATE: 16 BRPM | BODY MASS INDEX: 27.49 KG/M2 | OXYGEN SATURATION: 98 % | HEIGHT: 64 IN | TEMPERATURE: 98.2 F | HEART RATE: 76 BPM | WEIGHT: 161 LBS | SYSTOLIC BLOOD PRESSURE: 155 MMHG

## 2021-05-03 DIAGNOSIS — M19.011 OSTEOARTHRITIS OF BOTH SHOULDERS, UNSPECIFIED OSTEOARTHRITIS TYPE: Primary | ICD-10-CM

## 2021-05-03 DIAGNOSIS — N63.0 BREAST MASS: ICD-10-CM

## 2021-05-03 DIAGNOSIS — Z79.899 ENCOUNTER FOR LONG-TERM (CURRENT) USE OF OTHER MEDICATIONS: ICD-10-CM

## 2021-05-03 DIAGNOSIS — M19.012 OSTEOARTHRITIS OF BOTH SHOULDERS, UNSPECIFIED OSTEOARTHRITIS TYPE: Primary | ICD-10-CM

## 2021-05-03 PROCEDURE — 99214 OFFICE O/P EST MOD 30 MIN: CPT | Performed by: STUDENT IN AN ORGANIZED HEALTH CARE EDUCATION/TRAINING PROGRAM

## 2021-05-03 NOTE — PROGRESS NOTES
CHIEF COMPLAINT  Chief Complaint   Patient presents with   • Arm Pain     bilateral, LD Norco 05/02/2021 10pm   • Leg Pain     bilateral       Primary Care  Gris Dominguez MD    Subjective   Shantel Maharaj is a 61 y.o. female  who presents for follow-up.She states she has had longstanding low back pain and also developed bilateral shoulder and left arm pain following a mastectomy and lymph node stripping.  She was previously being evaluated by an outside pain clinic who is providing her narcotic pain medication as well as injections, but she does not know the type of injection she was receiving.  She states that additionally, she is required to attend chiropractic sessions which she thought were exacerbating her pain.  Given this, she wanted to transition care to [facility].    Back Pain  Associated symptoms include leg pain.   Arm Pain     Leg Pain          Location: Low back without radiation, bilateral shoulders, left arm Onset: Many years ago  Duration: Constant, slowly progressing  Timing: Constant throughout the day  Quality: The pain in the shoulder is a sharp, stabbing pain.  The low back as an aching, cramping pain  Severity: Today: 6       Last Week: 6       Worst: 8  Modifying Factors: The pain is worse with walking and physical activity.  The pain is slightly improved with lying flat as well as medication and compression on the left arm     Physical Therapy: yes    Interval Update 05/03/2021: She continues to do better.  Had mastectomy last month and is recovering well.  Has had some increased pain, but that is also improving.  She is doing well with her occasional hydrocodone.  She states that Sunday she does not take any medication at all.  She reports minimal constipation which is resolved with over-the-counter medications.  She is reporting some pain in the right lower extremity with radiation in the foot.  She reports that this occurred after surgery.  It is somewhat unclear what the etiology  of this is.  We discussed that if this pain continues, I can investigate further    The following portions of the patient's history were reviewed and updated as appropriate: allergies, current medications, past family history, past medical history, past social history, past surgical history and problem list.      Current Outpatient Medications:   •  amLODIPine (NORVASC) 10 MG tablet, Take 10 mg by mouth Daily., Disp: , Rfl:   •  Bystolic 20 MG tablet, Take 20 mg by mouth Daily., Disp: , Rfl:   •  cyclobenzaprine (FLEXERIL) 10 MG tablet, , Disp: , Rfl:   •  dexlansoprazole (DEXILANT) 60 MG capsule, Take 60 mg by mouth Daily., Disp: , Rfl:   •  escitalopram (LEXAPRO) 10 MG tablet, Take 10 mg by mouth Daily., Disp: , Rfl:   •  esomeprazole (nexIUM) 40 MG capsule, Take 40 mg by mouth., Disp: , Rfl:   •  fenofibrate (TRICOR) 145 MG tablet, Take 145 mg by mouth., Disp: , Rfl:   •  gabapentin (NEURONTIN) 100 MG capsule, As needed, Disp: , Rfl:   •  glipizide (GLUCOTROL XL) 2.5 MG 24 hr tablet, Take 2.5 mg by mouth Daily., Disp: , Rfl:   •  HYDROcodone-acetaminophen (NORCO) 7.5-325 MG per tablet, Take 1 tablet by mouth Every 8 (Eight) Hours As Needed for Severe Pain ., Disp: 90 tablet, Rfl: 0  •  levothyroxine (SYNTHROID, LEVOTHROID) 200 MCG tablet, TAKE 1 TABLET BY MOUTH EVERY DAY IN AM ON EMPTY STOMACH, Disp: , Rfl:   •  levothyroxine (SYNTHROID, LEVOTHROID) 25 MCG tablet, Take 25 mcg by mouth Daily., Disp: , Rfl:   •  potassium chloride (KLOR-CON) 20 MEQ packet, Take 20 mEq by mouth Daily., Disp: , Rfl:   •  promethazine (PHENERGAN) 25 MG tablet, Take 25 mg by mouth. As needed, Disp: , Rfl:   •  tamoxifen (NOLVADEX) 20 MG chemo tablet, Take  by mouth Daily., Disp: , Rfl:     Review of Systems   Musculoskeletal: Positive for arthralgias and back pain.        Bilateral shoulder pain    Left arm swelling       Vitals:    05/03/21 0758   BP: 155/79   Pulse: 76   Resp: 16   Temp: 98.2 °F (36.8 °C)   SpO2: 98%   Weight: 73 kg  "(161 lb)   Height: 162.6 cm (64.02\")   PainSc:   7       Urine Drug Screen: 12/14/20  Appropriate: yes    Objective   Physical Exam  Vitals and nursing note reviewed.   Constitutional:       General: She is not in acute distress.     Appearance: Normal appearance.   Musculoskeletal:      Comments: Left arm:  1.  Diffusely swollen to the level of the left shoulder.  2.  Left shoulder range of motion significantly decreased secondary to pain  3.  Left shoulder joint nontender to palpation.    Right arm:  1.  Nontender to palpation.  No swelling.  2.  Right shoulder tender to palpation across the supraspinatus tendon  3.  Extremely decreased range of motion in all directions both passive and active secondary to pain    Neurological:      Mental Status: She is alert.           Assessment/Plan   Problems Addressed this Visit     None      Visit Diagnoses     Osteoarthritis of both shoulders, unspecified osteoarthritis type    -  Primary    Breast mass        Encounter for long-term (current) use of other medications          Diagnoses       Codes Comments    Osteoarthritis of both shoulders, unspecified osteoarthritis type    -  Primary ICD-10-CM: M19.011, M19.012  ICD-9-CM: 715.91     Breast mass     ICD-10-CM: N63.0  ICD-9-CM: 611.72     Encounter for long-term (current) use of other medications     ICD-10-CM: Z79.899  ICD-9-CM: V58.69           Plan:  1.  Continue to follow-up in 2 months  2.  May need repeat shoulder injections in the future  3.  We will look at her right lower extremity if she continues to have pain  4.  Can refill hydrocodone as needed  5.  UDS inspect appropriate    --- Follow-up 2 mos.           INSPECT REPORT    As part of the patient's treatment plan, I may be prescribing controlled substances. The patient has been made aware of appropriate use of such medications, including potential risk of somnolence, limited ability to drive and/or work safely, and the potential for dependence or overdose. " It has also bee made clear that these medications are for use by this patient only, without concomitant use of alcohol or other substances unless prescribed.     Patient has completed prescribing agreement detailing terms of continued prescribing of controlled substances, including monitoring JACKIE reports, urine drug screening, and pill counts if necessary. The patient is aware that inappropriate use will results in cessation of prescribing such medications.    INSPECT report has been reviewed and scanned into the patient's chart.    As the clinician, I personally reviewed the INSPECT from 4/30/2021.    History and physical exam exhibit continued safe and appropriate use of controlled substances.      EMR Dragon/Transcription disclaimer:   Much of this encounter note is an electronic transcription/translation of spoken language to printed text. The electronic translation of spoken language may permit erroneous, or at times, nonsensical words or phrases to be inadvertently transcribed; Although I have reviewed the note for such errors, some may still exist.

## 2021-05-26 DIAGNOSIS — M19.011 OSTEOARTHRITIS OF BOTH SHOULDERS, UNSPECIFIED OSTEOARTHRITIS TYPE: ICD-10-CM

## 2021-05-26 DIAGNOSIS — M19.012 OSTEOARTHRITIS OF BOTH SHOULDERS, UNSPECIFIED OSTEOARTHRITIS TYPE: ICD-10-CM

## 2021-05-26 RX ORDER — HYDROCODONE BITARTRATE AND ACETAMINOPHEN 7.5; 325 MG/1; MG/1
1 TABLET ORAL EVERY 8 HOURS PRN
Qty: 90 TABLET | Refills: 0 | Status: SHIPPED | OUTPATIENT
Start: 2021-05-26 | End: 2021-07-21 | Stop reason: SDUPTHER

## 2021-06-28 ENCOUNTER — APPOINTMENT (OUTPATIENT)
Dept: PAIN MEDICINE | Facility: CLINIC | Age: 61
End: 2021-06-28

## 2021-06-28 ENCOUNTER — TELEPHONE (OUTPATIENT)
Dept: PAIN MEDICINE | Facility: CLINIC | Age: 61
End: 2021-06-28

## 2021-06-28 NOTE — TELEPHONE ENCOUNTER
6/28/21--(transferred call to [facility]) sent message to [facility]-- first thing we could schedule was 7/26 and pt needed sooner appt

## 2021-06-28 NOTE — TELEPHONE ENCOUNTER
SPOKE WITH PATIENT SHE SAID THAT SHE WOULD HAVE TO CALL ME BACK LOOKING TO SCHEDULE HERE OR IN [place] WHICHEVER SHE AGREES TO

## 2021-06-28 NOTE — TELEPHONE ENCOUNTER
Caller: ERIC ESCALANTE    Relationship to patient:SELF     Best call back number: [phone]    Chief complaint: PATIENT NOT FEELING WELL    Type of visit: FOLLOW UP    Requested date: ??     If rescheduling, when is the original appointment: 06/28/2021 @ 8:00     Additional notes:PATIENT STATES SHE HAS BEEN NAUSEOUS ALL MORNING- DID NOT WANT TO BRING ANYTHING TO THE OFFICE- STATES SHE NEEDS TO RESCHEDULE SOON IN ORDER TO GET HER MED REFILL.

## 2021-07-21 ENCOUNTER — TELEPHONE (OUTPATIENT)
Dept: PAIN MEDICINE | Facility: CLINIC | Age: 61
End: 2021-07-21

## 2021-07-21 DIAGNOSIS — M19.012 OSTEOARTHRITIS OF BOTH SHOULDERS, UNSPECIFIED OSTEOARTHRITIS TYPE: ICD-10-CM

## 2021-07-21 DIAGNOSIS — M19.011 OSTEOARTHRITIS OF BOTH SHOULDERS, UNSPECIFIED OSTEOARTHRITIS TYPE: ICD-10-CM

## 2021-07-21 RX ORDER — HYDROCODONE BITARTRATE AND ACETAMINOPHEN 7.5; 325 MG/1; MG/1
1 TABLET ORAL EVERY 8 HOURS PRN
Qty: 90 TABLET | Refills: 0 | Status: SHIPPED | OUTPATIENT
Start: 2021-07-25 | End: 2021-08-27 | Stop reason: SDUPTHER

## 2021-07-21 NOTE — TELEPHONE ENCOUNTER
Caller: ERIC ESCALANTE    Relationship: SELF    Best call back number: [phone]    Medication needed:   Requested Prescriptions      No prescriptions requested or ordered in this encounter   HYDROcodone-acetaminophen (NORCO) 7.5-325 MG        When do you need the refill by: ASAP      Does the patient have less than a 3 day supply:  [x] Yes  [] No    What is the patient's preferred pharmacy:    [facility] (IN CHART)

## 2021-07-29 ENCOUNTER — OFFICE (AMBULATORY)
Dept: URBAN - METROPOLITAN AREA CLINIC 64 | Facility: CLINIC | Age: 61
End: 2021-07-29

## 2021-07-29 VITALS
HEART RATE: 78 BPM | DIASTOLIC BLOOD PRESSURE: 108 MMHG | HEIGHT: 64 IN | WEIGHT: 162 LBS | SYSTOLIC BLOOD PRESSURE: 170 MMHG

## 2021-07-29 DIAGNOSIS — K59.03 DRUG INDUCED CONSTIPATION: ICD-10-CM

## 2021-07-29 DIAGNOSIS — R11.2 NAUSEA WITH VOMITING, UNSPECIFIED: ICD-10-CM

## 2021-07-29 DIAGNOSIS — K21.9 GASTRO-ESOPHAGEAL REFLUX DISEASE WITHOUT ESOPHAGITIS: ICD-10-CM

## 2021-07-29 DIAGNOSIS — R13.10 DYSPHAGIA, UNSPECIFIED: ICD-10-CM

## 2021-07-29 DIAGNOSIS — D50.0 IRON DEFICIENCY ANEMIA SECONDARY TO BLOOD LOSS (CHRONIC): ICD-10-CM

## 2021-07-29 PROCEDURE — 99214 OFFICE O/P EST MOD 30 MIN: CPT | Performed by: NURSE PRACTITIONER

## 2021-07-29 RX ORDER — DEXLANSOPRAZOLE 60 MG/1
60 CAPSULE, DELAYED RELEASE ORAL
Qty: 90 | Refills: 3 | Status: COMPLETED
Start: 2021-07-29 | End: 2023-08-25

## 2021-07-29 RX ORDER — FERROUS SULFATE TAB EC 325 MG (65 MG FE EQUIVALENT) 325 (65 FE) MG
325 TABLET DELAYED RESPONSE ORAL
Qty: 90 | Refills: 3 | Status: COMPLETED
Start: 2021-07-29 | End: 2023-08-25

## 2021-08-09 ENCOUNTER — OFFICE VISIT (OUTPATIENT)
Dept: PAIN MEDICINE | Facility: CLINIC | Age: 61
End: 2021-08-09

## 2021-08-09 VITALS
HEIGHT: 55 IN | RESPIRATION RATE: 16 BRPM | BODY MASS INDEX: 37.26 KG/M2 | HEART RATE: 88 BPM | SYSTOLIC BLOOD PRESSURE: 163 MMHG | WEIGHT: 161 LBS | DIASTOLIC BLOOD PRESSURE: 96 MMHG | OXYGEN SATURATION: 96 %

## 2021-08-09 DIAGNOSIS — M54.16 LUMBAR RADICULOPATHY: Primary | ICD-10-CM

## 2021-08-09 PROCEDURE — 99214 OFFICE O/P EST MOD 30 MIN: CPT | Performed by: STUDENT IN AN ORGANIZED HEALTH CARE EDUCATION/TRAINING PROGRAM

## 2021-08-09 RX ORDER — ANASTROZOLE 1 MG/1
1 TABLET ORAL DAILY
COMMUNITY
Start: 2021-06-21

## 2021-08-09 RX ORDER — LEVOTHYROXINE SODIUM 0.05 MG/1
50 TABLET ORAL
COMMUNITY
Start: 2021-07-07 | End: 2022-09-26

## 2021-08-09 RX ORDER — CYCLOBENZAPRINE HCL 10 MG
10 TABLET ORAL 3 TIMES DAILY PRN
Qty: 90 TABLET | Refills: 2 | Status: SHIPPED | OUTPATIENT
Start: 2021-08-09 | End: 2021-10-18 | Stop reason: SDUPTHER

## 2021-08-09 RX ORDER — LISINOPRIL 5 MG/1
5 TABLET ORAL DAILY
Status: ON HOLD | COMMUNITY
Start: 2021-07-05 | End: 2022-02-26 | Stop reason: SDUPTHER

## 2021-08-09 RX ORDER — FLUTICASONE PROPIONATE 50 MCG
1 SPRAY, SUSPENSION (ML) NASAL AS NEEDED
COMMUNITY
Start: 2021-05-24

## 2021-08-09 RX ORDER — FERROUS SULFATE 325(65) MG
325 TABLET ORAL AS NEEDED
COMMUNITY
Start: 2021-07-29

## 2021-08-09 NOTE — PROGRESS NOTES
CHIEF COMPLAINT  Chief Complaint   Patient presents with   • Back Pain     no  cbd use---  hydrocodone   uses prn-  ld  8/8 2 at 10:30 pm---  Pt signed and reviewed med list   • Leg Pain   • Arm Pain   • Breast Cancer     surgery x2       Primary Care  Gris Dominguez MD    Subjective   Shantel Maharaj is a 61 y.o. female  who presents for follow-up.She states she has had longstanding low back pain and also developed bilateral shoulder and left arm pain following a mastectomy and lymph node stripping.  She was previously being evaluated by an outside pain clinic who is providing her narcotic pain medication as well as injections, but she does not know the type of injection she was receiving.  She states that additionally, she is required to attend chiropractic sessions which she thought were exacerbating her pain.  Given this, she wanted to transition care to [facility].    Arm Pain     Leg Pain     Back Pain  Associated symptoms include leg pain.        Location: Low back without radiation, bilateral shoulders, left arm Onset: Many years ago  Duration: Constant, slowly progressing  Timing: Constant throughout the day  Quality: The pain in the shoulder is a sharp, stabbing pain.  The low back as an aching, cramping pain  Severity: Today: 6       Last Week: 6       Worst: 8  Modifying Factors: The pain is worse with walking and physical activity.  The pain is slightly improved with lying flat as well as medication and compression on the left arm     Physical Therapy: yes    Interval Update 08/09/2021: Overall doing well.  She gets good relief with her hydrocodone and only takes it occasionally.  She denies constipation or sedation.  She also gets good relief with tramadol.  Continues to have some pain down the legs bilaterally and radicular pattern as well as pain radiating into the hips which is not resolved from prior.    The following portions of the patient's history were reviewed and updated as appropriate:  allergies, current medications, past family history, past medical history, past social history, past surgical history and problem list.      Current Outpatient Medications:   •  amLODIPine (NORVASC) 10 MG tablet, Take 10 mg by mouth Daily., Disp: , Rfl:   •  anastrozole (ARIMIDEX) 1 MG tablet, Take 1 mg by mouth Daily., Disp: , Rfl:   •  Bystolic 20 MG tablet, Take 20 mg by mouth Daily., Disp: , Rfl:   •  Calcium 600/Vitamin D 600-400 MG-UNIT chewable tablet, Chew 1 tablet 2 (Two) Times a Day., Disp: , Rfl:   •  cyclobenzaprine (FLEXERIL) 10 MG tablet, Take 1 tablet by mouth 3 (Three) Times a Day As Needed for Muscle Spasms., Disp: 90 tablet, Rfl: 2  •  dexlansoprazole (DEXILANT) 60 MG capsule, Take 60 mg by mouth Daily., Disp: , Rfl:   •  escitalopram (LEXAPRO) 10 MG tablet, Take 10 mg by mouth Daily., Disp: , Rfl:   •  esomeprazole (nexIUM) 40 MG capsule, Take 40 mg by mouth., Disp: , Rfl:   •  fenofibrate (TRICOR) 145 MG tablet, Take 145 mg by mouth., Disp: , Rfl:   •  ferrous sulfate 325 (65 FE) MG tablet, TAKE 1 TABLET BY MOUTH ONCE A DAY AS DIRECTED, Disp: , Rfl:   •  fluticasone (FLONASE) 50 MCG/ACT nasal spray, 1 spray by Each Nare route Daily. As needed, Disp: , Rfl:   •  gabapentin (NEURONTIN) 100 MG capsule, As needed, Disp: , Rfl:   •  glipizide (GLUCOTROL XL) 2.5 MG 24 hr tablet, Take 2.5 mg by mouth Daily., Disp: , Rfl:   •  HYDROcodone-acetaminophen (NORCO) 7.5-325 MG per tablet, Take 1 tablet by mouth Every 8 (Eight) Hours As Needed for Severe Pain ., Disp: 90 tablet, Rfl: 0  •  levothyroxine (SYNTHROID, LEVOTHROID) 200 MCG tablet, TAKE 1 TABLET BY MOUTH EVERY DAY IN AM ON EMPTY STOMACH, Disp: , Rfl:   •  levothyroxine (SYNTHROID, LEVOTHROID) 50 MCG tablet, 1 (ONE) TABLET DAILY WITH 200MCG TO MAKE 250MCG DAILY, Disp: , Rfl:   •  lisinopril (PRINIVIL,ZESTRIL) 5 MG tablet, Take 5 mg by mouth Daily., Disp: , Rfl:   •  potassium chloride (KLOR-CON) 20 MEQ packet, Take 20 mEq by mouth Daily., Disp: , Rfl:  "  •  promethazine (PHENERGAN) 25 MG tablet, Take 25 mg by mouth. As needed, Disp: , Rfl:     Review of Systems   Musculoskeletal: Positive for arthralgias and back pain.        Bilateral shoulder pain    Left arm swelling       Vitals:    08/09/21 0903   BP: 163/96   Pulse: 88   Resp: 16   SpO2: 96%   Weight: 73 kg (161 lb)   Height: 64 cm (25.2\")   PainSc:   7       Urine Drug Screen: 12/14/20  Appropriate: yes    Objective   Physical Exam  Vitals and nursing note reviewed.   Constitutional:       General: She is not in acute distress.     Appearance: Normal appearance.   Musculoskeletal:      Comments: Left arm:  1.  Diffusely swollen to the level of the left shoulder.  2.  Left shoulder range of motion significantly decreased secondary to pain  3.  Left shoulder joint nontender to palpation.    Right arm:  1.  Nontender to palpation.  No swelling.  2.  Right shoulder tender to palpation across the supraspinatus tendon  3.  Extremely decreased range of motion in all directions both passive and active secondary to pain     Lumbar spine exam:  1.  Facet loading equivocal bilaterally  2.  Facets weakly tender to palpation bilaterally  3.  Straight leg raise weakly positive bilaterally  4.  Lumbar range of motion decreased secondary to pain.   Neurological:      Mental Status: She is alert.      Sensory: No sensory deficit.      Motor: No weakness.           Assessment/Plan   Problems Addressed this Visit     None      Visit Diagnoses     Lumbar radiculopathy    -  Primary    Relevant Orders    MRI Lumbar Spine Without Contrast      Diagnoses       Codes Comments    Lumbar radiculopathy    -  Primary ICD-10-CM: M54.16  ICD-9-CM: 724.4           Plan:  1.  We will order MRI for lumbar radiculopathy.  She has had symptoms since after surgery and I am somewhat unsure what the cause is.  May have suffered a disc bulge or disc herniation during the operative procedure  2.  Refill hydrocodone as needed  3.  Refill " cyclobenzaprine as needed  4.  UDS inspect appropriate    --- Follow-up after MRI           INSPECT REPORT    As part of the patient's treatment plan, I may be prescribing controlled substances. The patient has been made aware of appropriate use of such medications, including potential risk of somnolence, limited ability to drive and/or work safely, and the potential for dependence or overdose. It has also bee made clear that these medications are for use by this patient only, without concomitant use of alcohol or other substances unless prescribed.     Patient has completed prescribing agreement detailing terms of continued prescribing of controlled substances, including monitoring JACKIE reports, urine drug screening, and pill counts if necessary. The patient is aware that inappropriate use will results in cessation of prescribing such medications.    INSPECT report has been reviewed and scanned into the patient's chart.    As the clinician, I personally reviewed the INSPECT from 8/6/2021.    History and physical exam exhibit continued safe and appropriate use of controlled substances.      EMR Dragon/Transcription disclaimer:   Much of this encounter note is an electronic transcription/translation of spoken language to printed text. The electronic translation of spoken language may permit erroneous, or at times, nonsensical words or phrases to be inadvertently transcribed; Although I have reviewed the note for such errors, some may still exist.

## 2021-08-27 DIAGNOSIS — M19.011 OSTEOARTHRITIS OF BOTH SHOULDERS, UNSPECIFIED OSTEOARTHRITIS TYPE: ICD-10-CM

## 2021-08-27 DIAGNOSIS — M19.012 OSTEOARTHRITIS OF BOTH SHOULDERS, UNSPECIFIED OSTEOARTHRITIS TYPE: ICD-10-CM

## 2021-08-27 RX ORDER — HYDROCODONE BITARTRATE AND ACETAMINOPHEN 7.5; 325 MG/1; MG/1
1 TABLET ORAL EVERY 8 HOURS PRN
Qty: 90 TABLET | Refills: 0 | Status: SHIPPED | OUTPATIENT
Start: 2021-08-27 | End: 2021-10-01 | Stop reason: SDUPTHER

## 2021-08-31 ENCOUNTER — ON CAMPUS - OUTPATIENT (AMBULATORY)
Dept: URBAN - METROPOLITAN AREA HOSPITAL 2 | Facility: HOSPITAL | Age: 61
End: 2021-08-31

## 2021-08-31 VITALS
RESPIRATION RATE: 20 BRPM | DIASTOLIC BLOOD PRESSURE: 67 MMHG | DIASTOLIC BLOOD PRESSURE: 53 MMHG | SYSTOLIC BLOOD PRESSURE: 152 MMHG | OXYGEN SATURATION: 96 % | SYSTOLIC BLOOD PRESSURE: 101 MMHG | HEART RATE: 62 BPM | RESPIRATION RATE: 16 BRPM | OXYGEN SATURATION: 100 % | RESPIRATION RATE: 12 BRPM | HEART RATE: 61 BPM | OXYGEN SATURATION: 99 % | DIASTOLIC BLOOD PRESSURE: 50 MMHG | RESPIRATION RATE: 23 BRPM | OXYGEN SATURATION: 97 % | DIASTOLIC BLOOD PRESSURE: 74 MMHG | TEMPERATURE: 97.6 F | SYSTOLIC BLOOD PRESSURE: 102 MMHG | WEIGHT: 160 LBS | HEART RATE: 59 BPM | DIASTOLIC BLOOD PRESSURE: 75 MMHG | DIASTOLIC BLOOD PRESSURE: 57 MMHG | OXYGEN SATURATION: 88 % | HEART RATE: 68 BPM | SYSTOLIC BLOOD PRESSURE: 131 MMHG | HEART RATE: 56 BPM | HEIGHT: 64 IN | SYSTOLIC BLOOD PRESSURE: 122 MMHG | SYSTOLIC BLOOD PRESSURE: 106 MMHG

## 2021-08-31 DIAGNOSIS — R13.10 DYSPHAGIA, UNSPECIFIED: ICD-10-CM

## 2021-08-31 DIAGNOSIS — K22.2 ESOPHAGEAL OBSTRUCTION: ICD-10-CM

## 2021-08-31 DIAGNOSIS — K44.9 DIAPHRAGMATIC HERNIA WITHOUT OBSTRUCTION OR GANGRENE: ICD-10-CM

## 2021-08-31 PROCEDURE — 43450 DILATE ESOPHAGUS 1/MULT PASS: CPT | Performed by: INTERNAL MEDICINE

## 2021-08-31 PROCEDURE — 43235 EGD DIAGNOSTIC BRUSH WASH: CPT | Performed by: INTERNAL MEDICINE

## 2021-08-31 RX ORDER — SUCRALFATE 1 G/1
TABLET ORAL
Qty: 0 | Refills: 0 | Status: COMPLETED
End: 2023-08-25

## 2021-10-01 DIAGNOSIS — M19.012 OSTEOARTHRITIS OF BOTH SHOULDERS, UNSPECIFIED OSTEOARTHRITIS TYPE: ICD-10-CM

## 2021-10-01 DIAGNOSIS — M19.011 OSTEOARTHRITIS OF BOTH SHOULDERS, UNSPECIFIED OSTEOARTHRITIS TYPE: ICD-10-CM

## 2021-10-01 RX ORDER — HYDROCODONE BITARTRATE AND ACETAMINOPHEN 7.5; 325 MG/1; MG/1
1 TABLET ORAL EVERY 8 HOURS PRN
Qty: 90 TABLET | Refills: 0 | Status: SHIPPED | OUTPATIENT
Start: 2021-10-01 | End: 2021-10-18 | Stop reason: SDUPTHER

## 2021-10-18 ENCOUNTER — OFFICE VISIT (OUTPATIENT)
Dept: PAIN MEDICINE | Facility: CLINIC | Age: 61
End: 2021-10-18

## 2021-10-18 VITALS
RESPIRATION RATE: 16 BRPM | HEIGHT: 64 IN | BODY MASS INDEX: 27.49 KG/M2 | OXYGEN SATURATION: 98 % | WEIGHT: 161 LBS | HEART RATE: 68 BPM | SYSTOLIC BLOOD PRESSURE: 171 MMHG | DIASTOLIC BLOOD PRESSURE: 92 MMHG

## 2021-10-18 DIAGNOSIS — M19.011 OSTEOARTHRITIS OF BOTH SHOULDERS, UNSPECIFIED OSTEOARTHRITIS TYPE: Primary | ICD-10-CM

## 2021-10-18 DIAGNOSIS — Z79.899 HIGH RISK MEDICATION USE: Primary | ICD-10-CM

## 2021-10-18 DIAGNOSIS — M54.16 LUMBAR RADICULOPATHY: ICD-10-CM

## 2021-10-18 DIAGNOSIS — M19.012 OSTEOARTHRITIS OF BOTH SHOULDERS, UNSPECIFIED OSTEOARTHRITIS TYPE: Primary | ICD-10-CM

## 2021-10-18 PROCEDURE — 99214 OFFICE O/P EST MOD 30 MIN: CPT | Performed by: STUDENT IN AN ORGANIZED HEALTH CARE EDUCATION/TRAINING PROGRAM

## 2021-10-18 RX ORDER — HYDROCODONE BITARTRATE AND ACETAMINOPHEN 7.5; 325 MG/1; MG/1
1 TABLET ORAL EVERY 8 HOURS PRN
Qty: 90 TABLET | Refills: 0 | Status: ON HOLD | OUTPATIENT
Start: 2021-11-29 | End: 2022-02-25 | Stop reason: SDUPTHER

## 2021-10-18 RX ORDER — SUCRALFATE 1 G/1
1 TABLET ORAL DAILY
COMMUNITY
Start: 2021-10-08

## 2021-10-18 RX ORDER — HYDROCODONE BITARTRATE AND ACETAMINOPHEN 7.5; 325 MG/1; MG/1
1 TABLET ORAL EVERY 8 HOURS PRN
Qty: 90 TABLET | Refills: 0 | Status: SHIPPED | OUTPATIENT
Start: 2021-10-30 | End: 2021-12-13 | Stop reason: SDUPTHER

## 2021-10-18 RX ORDER — CYCLOBENZAPRINE HCL 10 MG
10 TABLET ORAL 3 TIMES DAILY PRN
Qty: 90 TABLET | Refills: 2 | Status: SHIPPED | OUTPATIENT
Start: 2021-10-18

## 2021-10-18 NOTE — PROGRESS NOTES
CHIEF COMPLAINT  Chief Complaint   Patient presents with   • Back Pain     Hydrocodone LD 10/17@730pm        Primary Care  Gris Dominguez MD    Subjective   Shantel Maharaj is a 61 y.o. female  who presents for follow-up.She states she has had longstanding low back pain and also developed bilateral shoulder and left arm pain following a mastectomy and lymph node stripping.  She was previously being evaluated by an outside pain clinic who is providing her narcotic pain medication as well as injections, but she does not know the type of injection she was receiving.  She states that additionally, she is required to attend chiropractic sessions which she thought were exacerbating her pain.  Given this, she wanted to transition care to [facility].    Back Pain  Associated symptoms include leg pain.   Leg Pain     Arm Pain          Location: Low back without radiation, bilateral shoulders, left arm Onset: Many years ago  Duration: Constant, slowly progressing  Timing: Constant throughout the day  Quality: The pain in the shoulder is a sharp, stabbing pain.  The low back as an aching, cramping pain  Severity: Today: 6       Last Week: 6       Worst: 8  Modifying Factors: The pain is worse with walking and physical activity.  The pain is slightly improved with lying flat as well as medication and compression on the left arm     Physical Therapy: yes    Interval Update 10/18/2021: Good relief with her hydrocodone.  Denies side effects of sedation or constipation.  She was scheduling an MRI but did not as she states that she suffered Covid.  She states that she is better now and will get the MRI before the next visit.    The following portions of the patient's history were reviewed and updated as appropriate: allergies, current medications, past family history, past medical history, past social history, past surgical history and problem list.      Current Outpatient Medications:   •  amLODIPine (NORVASC) 10 MG tablet,  Take 10 mg by mouth Daily., Disp: , Rfl:   •  anastrozole (ARIMIDEX) 1 MG tablet, Take 1 mg by mouth Daily., Disp: , Rfl:   •  Bystolic 20 MG tablet, Take 20 mg by mouth Daily., Disp: , Rfl:   •  Calcium 600/Vitamin D 600-400 MG-UNIT chewable tablet, Chew 1 tablet 2 (Two) Times a Day., Disp: , Rfl:   •  cyclobenzaprine (FLEXERIL) 10 MG tablet, Take 1 tablet by mouth 3 (Three) Times a Day As Needed for Muscle Spasms., Disp: 90 tablet, Rfl: 2  •  dexlansoprazole (DEXILANT) 60 MG capsule, Take 60 mg by mouth Daily., Disp: , Rfl:   •  escitalopram (LEXAPRO) 10 MG tablet, Take 10 mg by mouth Daily., Disp: , Rfl:   •  esomeprazole (nexIUM) 40 MG capsule, Take 40 mg by mouth., Disp: , Rfl:   •  fenofibrate (TRICOR) 145 MG tablet, Take 145 mg by mouth., Disp: , Rfl:   •  ferrous sulfate 325 (65 FE) MG tablet, TAKE 1 TABLET BY MOUTH ONCE A DAY AS DIRECTED, Disp: , Rfl:   •  fluticasone (FLONASE) 50 MCG/ACT nasal spray, 1 spray by Each Nare route Daily. As needed, Disp: , Rfl:   •  gabapentin (NEURONTIN) 100 MG capsule, As needed, Disp: , Rfl:   •  glipizide (GLUCOTROL XL) 2.5 MG 24 hr tablet, Take 2.5 mg by mouth Daily., Disp: , Rfl:   •  [START ON 10/30/2021] HYDROcodone-acetaminophen (NORCO) 7.5-325 MG per tablet, Take 1 tablet by mouth Every 8 (Eight) Hours As Needed for Severe Pain ., Disp: 90 tablet, Rfl: 0  •  levothyroxine (SYNTHROID, LEVOTHROID) 200 MCG tablet, TAKE 1 TABLET BY MOUTH EVERY DAY IN AM ON EMPTY STOMACH, Disp: , Rfl:   •  levothyroxine (SYNTHROID, LEVOTHROID) 50 MCG tablet, 1 (ONE) TABLET DAILY WITH 200MCG TO MAKE 250MCG DAILY, Disp: , Rfl:   •  lisinopril (PRINIVIL,ZESTRIL) 5 MG tablet, Take 5 mg by mouth Daily., Disp: , Rfl:   •  potassium chloride (KLOR-CON) 20 MEQ packet, Take 20 mEq by mouth Daily., Disp: , Rfl:   •  promethazine (PHENERGAN) 25 MG tablet, Take 25 mg by mouth. As needed, Disp: , Rfl:   •  sucralfate (CARAFATE) 1 g tablet, , Disp: , Rfl:   •  [START ON 11/29/2021]  "HYDROcodone-acetaminophen (NORCO) 7.5-325 MG per tablet, Take 1 tablet by mouth Every 8 (Eight) Hours As Needed for Moderate Pain ., Disp: 90 tablet, Rfl: 0    Review of Systems   Musculoskeletal: Positive for arthralgias and back pain.        Bilateral shoulder pain    Left arm swelling       Vitals:    10/18/21 0824   BP: 171/92   Pulse: 68   Resp: 16   SpO2: 98%   Weight: 73 kg (161 lb)   Height: 162.6 cm (64\")   PainSc:   6       Urine Drug Screen: 10/18/2021  Appropriate: Pending    Objective   Physical Exam  Vitals and nursing note reviewed.   Constitutional:       General: She is not in acute distress.     Appearance: Normal appearance.   Musculoskeletal:      Comments: Left arm:  1.  Diffusely swollen to the level of the left shoulder.  2.  Left shoulder range of motion significantly decreased secondary to pain  3.  Left shoulder joint nontender to palpation.    Right arm:  1.  Nontender to palpation.  No swelling.  2.  Right shoulder tender to palpation across the supraspinatus tendon  3.  Extremely decreased range of motion in all directions both passive and active secondary to pain     Lumbar spine exam:  1.  Facet loading equivocal bilaterally  2.  Facets weakly tender to palpation bilaterally  3.  Straight leg raise weakly positive bilaterally  4.  Lumbar range of motion decreased secondary to pain.   Neurological:      Mental Status: She is alert.      Sensory: No sensory deficit.      Motor: No weakness.           Assessment/Plan   Problems Addressed this Visit     None      Visit Diagnoses     Osteoarthritis of both shoulders, unspecified osteoarthritis type    -  Primary    Relevant Medications    HYDROcodone-acetaminophen (NORCO) 7.5-325 MG per tablet (Start on 10/30/2021)    HYDROcodone-acetaminophen (NORCO) 7.5-325 MG per tablet (Start on 11/29/2021)    Lumbar radiculopathy        Relevant Medications    HYDROcodone-acetaminophen (NORCO) 7.5-325 MG per tablet (Start on 11/29/2021)      Diagnoses  "      Codes Comments    Osteoarthritis of both shoulders, unspecified osteoarthritis type    -  Primary ICD-10-CM: M19.011, M19.012  ICD-9-CM: 715.91     Lumbar radiculopathy     ICD-10-CM: M54.16  ICD-9-CM: 724.4           Plan:  1.  Obtain MRI when able for lumbar radiculopathy  2.  Refill hydrocodone  3.  Repeat UDS today  4.  Inspect appropriate    --- Follow-up 2 months           INSPECT REPORT    As part of the patient's treatment plan, I may be prescribing controlled substances. The patient has been made aware of appropriate use of such medications, including potential risk of somnolence, limited ability to drive and/or work safely, and the potential for dependence or overdose. It has also bee made clear that these medications are for use by this patient only, without concomitant use of alcohol or other substances unless prescribed.     Patient has completed prescribing agreement detailing terms of continued prescribing of controlled substances, including monitoring JACKIE reports, urine drug screening, and pill counts if necessary. The patient is aware that inappropriate use will results in cessation of prescribing such medications.    INSPECT report has been reviewed and scanned into the patient's chart.    As the clinician, I personally reviewed the INSPECT from 10/14/2021.    History and physical exam exhibit continued safe and appropriate use of controlled substances.      EMR Dragon/Transcription disclaimer:   Much of this encounter note is an electronic transcription/translation of spoken language to printed text. The electronic translation of spoken language may permit erroneous, or at times, nonsensical words or phrases to be inadvertently transcribed; Although I have reviewed the note for such errors, some may still exist.

## 2021-12-13 ENCOUNTER — OFFICE VISIT (OUTPATIENT)
Dept: PAIN MEDICINE | Facility: CLINIC | Age: 61
End: 2021-12-13

## 2021-12-13 VITALS
BODY MASS INDEX: 27.49 KG/M2 | HEIGHT: 64 IN | RESPIRATION RATE: 16 BRPM | OXYGEN SATURATION: 98 % | HEART RATE: 75 BPM | WEIGHT: 161 LBS | SYSTOLIC BLOOD PRESSURE: 177 MMHG | DIASTOLIC BLOOD PRESSURE: 102 MMHG

## 2021-12-13 DIAGNOSIS — M19.011 OSTEOARTHRITIS OF BOTH SHOULDERS, UNSPECIFIED OSTEOARTHRITIS TYPE: ICD-10-CM

## 2021-12-13 DIAGNOSIS — M19.012 OSTEOARTHRITIS OF BOTH SHOULDERS, UNSPECIFIED OSTEOARTHRITIS TYPE: ICD-10-CM

## 2021-12-13 PROCEDURE — 99214 OFFICE O/P EST MOD 30 MIN: CPT | Performed by: STUDENT IN AN ORGANIZED HEALTH CARE EDUCATION/TRAINING PROGRAM

## 2021-12-13 RX ORDER — HYDROCODONE BITARTRATE AND ACETAMINOPHEN 7.5; 325 MG/1; MG/1
1 TABLET ORAL EVERY 8 HOURS PRN
Qty: 90 TABLET | Refills: 0 | Status: ON HOLD | OUTPATIENT
Start: 2022-01-12 | End: 2022-02-24

## 2021-12-13 NOTE — PROGRESS NOTES
CHIEF COMPLAINT  Chief Complaint   Patient presents with   • Back Pain     NORCO LAST DOSE 12/13/21 AROUND 0300   • Arm Pain   • Knee Pain     LEFT.        Primary Care  Gris Dominguez MD    Subjective   Shantel Maharaj is a 61 y.o. female  who presents for follow-up.She states she has had longstanding low back pain and also developed bilateral shoulder and left arm pain following a mastectomy and lymph node stripping.  She was previously being evaluated by an outside pain clinic who is providing her narcotic pain medication as well as injections, but she does not know the type of injection she was receiving.  She states that additionally, she is required to attend chiropractic sessions which she thought were exacerbating her pain.  Given this, she wanted to transition care to [facility].    Back Pain  Associated symptoms include leg pain.   Leg Pain     Arm Pain     Knee Pain          Location: Low back without radiation, bilateral shoulders, left arm Onset: Many years ago  Duration: Constant, slowly progressing  Timing: Constant throughout the day  Quality: The pain in the shoulder is a sharp, stabbing pain.  The low back as an aching, cramping pain  Severity: Today: 6       Last Week: 6       Worst: 8  Modifying Factors: The pain is worse with walking and physical activity.  The pain is slightly improved with lying flat as well as medication and compression on the left arm     Physical Therapy: yes    Interval Update 12/13/2021: Good relief with her hydrocodone.  Denies side effects of sedation or constipation.  Still MRI for low back pain.  Complaining of worsening shoulder pain    The following portions of the patient's history were reviewed and updated as appropriate: allergies, current medications, past family history, past medical history, past social history, past surgical history and problem list.      Current Outpatient Medications:   •  amLODIPine (NORVASC) 10 MG tablet, Take 10 mg by mouth Daily.,  Disp: , Rfl:   •  anastrozole (ARIMIDEX) 1 MG tablet, Take 1 mg by mouth Daily., Disp: , Rfl:   •  Bystolic 20 MG tablet, Take 20 mg by mouth Daily., Disp: , Rfl:   •  Calcium 600/Vitamin D 600-400 MG-UNIT chewable tablet, Chew 1 tablet 2 (Two) Times a Day., Disp: , Rfl:   •  cyclobenzaprine (FLEXERIL) 10 MG tablet, Take 1 tablet by mouth 3 (Three) Times a Day As Needed for Muscle Spasms., Disp: 90 tablet, Rfl: 2  •  dexlansoprazole (DEXILANT) 60 MG capsule, Take 60 mg by mouth Daily., Disp: , Rfl:   •  escitalopram (LEXAPRO) 10 MG tablet, Take 10 mg by mouth Daily., Disp: , Rfl:   •  esomeprazole (nexIUM) 40 MG capsule, Take 40 mg by mouth., Disp: , Rfl:   •  fenofibrate (TRICOR) 145 MG tablet, Take 145 mg by mouth., Disp: , Rfl:   •  ferrous sulfate 325 (65 FE) MG tablet, TAKE 1 TABLET BY MOUTH ONCE A DAY AS DIRECTED, Disp: , Rfl:   •  fluticasone (FLONASE) 50 MCG/ACT nasal spray, 1 spray by Each Nare route Daily. As needed, Disp: , Rfl:   •  gabapentin (NEURONTIN) 100 MG capsule, As needed, Disp: , Rfl:   •  glipizide (GLUCOTROL XL) 2.5 MG 24 hr tablet, Take 2.5 mg by mouth Daily., Disp: , Rfl:   •  HYDROcodone-acetaminophen (NORCO) 7.5-325 MG per tablet, Take 1 tablet by mouth Every 8 (Eight) Hours As Needed for Moderate Pain ., Disp: 90 tablet, Rfl: 0  •  [START ON 1/12/2022] HYDROcodone-acetaminophen (NORCO) 7.5-325 MG per tablet, Take 1 tablet by mouth Every 8 (Eight) Hours As Needed for Severe Pain ., Disp: 90 tablet, Rfl: 0  •  levothyroxine (SYNTHROID, LEVOTHROID) 200 MCG tablet, TAKE 1 TABLET BY MOUTH EVERY DAY IN AM ON EMPTY STOMACH, Disp: , Rfl:   •  levothyroxine (SYNTHROID, LEVOTHROID) 50 MCG tablet, 1 (ONE) TABLET DAILY WITH 200MCG TO MAKE 250MCG DAILY, Disp: , Rfl:   •  lisinopril (PRINIVIL,ZESTRIL) 5 MG tablet, Take 5 mg by mouth Daily., Disp: , Rfl:   •  potassium chloride (KLOR-CON) 20 MEQ packet, Take 20 mEq by mouth Daily., Disp: , Rfl:   •  promethazine (PHENERGAN) 25 MG tablet, Take 25 mg by  "mouth. As needed, Disp: , Rfl:   •  sucralfate (CARAFATE) 1 g tablet, , Disp: , Rfl:     Review of Systems   Musculoskeletal: Positive for arthralgias and back pain.        Bilateral shoulder pain    Left arm swelling       Vitals:    12/13/21 0814   BP: (!) 177/102   Pulse: 75   Resp: 16   SpO2: 98%   Weight: 73 kg (161 lb)   Height: 162.6 cm (64\")   PainSc:   8       Urine Drug Screen: 10/18/2021  Appropriate: Yes    Objective   Physical Exam  Vitals and nursing note reviewed.   Constitutional:       General: She is not in acute distress.     Appearance: Normal appearance.   Musculoskeletal:      Comments: Left arm:  1.  Diffusely swollen to the level of the left shoulder.  2.  Left shoulder range of motion significantly decreased secondary to pain  3.  Left shoulder joint nontender to palpation.    Right arm:  1.  Nontender to palpation.  No swelling.  2.  Right shoulder tender to palpation across the supraspinatus tendon  3.  Extremely decreased range of motion in all directions both passive and active secondary to pain     Lumbar spine exam:  1.  Facet loading equivocal bilaterally  2.  Facets weakly tender to palpation bilaterally  3.  Straight leg raise weakly positive bilaterally  4.  Lumbar range of motion decreased secondary to pain.   Neurological:      Mental Status: She is alert.      Sensory: No sensory deficit.      Motor: No weakness.           Assessment/Plan   Problems Addressed this Visit     None      Visit Diagnoses     Osteoarthritis of both shoulders, unspecified osteoarthritis type        Relevant Medications    HYDROcodone-acetaminophen (NORCO) 7.5-325 MG per tablet (Start on 1/12/2022)    Other Relevant Orders    Large Joint Arthrocentesis      Diagnoses       Codes Comments    Osteoarthritis of both shoulders, unspecified osteoarthritis type     ICD-10-CM: M19.011, M19.012  ICD-9-CM: 715.91           Plan:  1.  Still pending MRI  2.  Refill hydrocodone  3.  UDS appropriate  4.  Inspect " appropriate  5.  Plan for repeat bilateral shoulder injections    --- Follow-up 2 months           INSPECT REPORT    As part of the patient's treatment plan, I may be prescribing controlled substances. The patient has been made aware of appropriate use of such medications, including potential risk of somnolence, limited ability to drive and/or work safely, and the potential for dependence or overdose. It has also bee made clear that these medications are for use by this patient only, without concomitant use of alcohol or other substances unless prescribed.     Patient has completed prescribing agreement detailing terms of continued prescribing of controlled substances, including monitoring JACKIE reports, urine drug screening, and pill counts if necessary. The patient is aware that inappropriate use will results in cessation of prescribing such medications.    INSPECT report has been reviewed and scanned into the patient's chart.    As the clinician, I personally reviewed the INSPECT from 12/10/2021.    History and physical exam exhibit continued safe and appropriate use of controlled substances.      EMR Dragon/Transcription disclaimer:   Much of this encounter note is an electronic transcription/translation of spoken language to printed text. The electronic translation of spoken language may permit erroneous, or at times, nonsensical words or phrases to be inadvertently transcribed; Although I have reviewed the note for such errors, some may still exist.

## 2022-01-11 ENCOUNTER — TELEPHONE (OUTPATIENT)
Dept: PAIN MEDICINE | Facility: CLINIC | Age: 62
End: 2022-01-11

## 2022-01-11 NOTE — TELEPHONE ENCOUNTER
Caller: Shantel Maharaj    Relationship to patient: Self    Best call back number: [phone]    Patient is needing: PATIENT HAS APPT TOMORROW - 1/12 FOR AN INJECTION.  SHE SAID SHE WAS WITH A LOT OF PEOPLE OVER THE WEEKEND AND TODAY 5 OF THEM HAVE CALLED HER SAYING THEY HAVE COVID.  SHE WANTED TO KNOW IF SHE NEEDS TO CANCEL HER APPT OR NOT

## 2022-01-12 ENCOUNTER — APPOINTMENT (OUTPATIENT)
Dept: PAIN MEDICINE | Facility: HOSPITAL | Age: 62
End: 2022-01-12

## 2022-01-19 ENCOUNTER — APPOINTMENT (OUTPATIENT)
Dept: PAIN MEDICINE | Facility: HOSPITAL | Age: 62
End: 2022-01-19

## 2022-02-21 ENCOUNTER — APPOINTMENT (OUTPATIENT)
Dept: GENERAL RADIOLOGY | Facility: HOSPITAL | Age: 62
End: 2022-02-21

## 2022-02-21 ENCOUNTER — APPOINTMENT (OUTPATIENT)
Dept: CT IMAGING | Facility: HOSPITAL | Age: 62
End: 2022-02-21

## 2022-02-21 ENCOUNTER — HOSPITAL ENCOUNTER (INPATIENT)
Facility: HOSPITAL | Age: 62
LOS: 2 days | Discharge: HOME HEALTH | End: 2022-02-26
Attending: EMERGENCY MEDICINE | Admitting: INTERNAL MEDICINE

## 2022-02-21 DIAGNOSIS — I49.5 SICK SINUS SYNDROME: ICD-10-CM

## 2022-02-21 DIAGNOSIS — M54.16 LUMBAR RADICULOPATHY: ICD-10-CM

## 2022-02-21 DIAGNOSIS — M19.012 OSTEOARTHRITIS OF BOTH SHOULDERS, UNSPECIFIED OSTEOARTHRITIS TYPE: ICD-10-CM

## 2022-02-21 DIAGNOSIS — M19.011 OSTEOARTHRITIS OF BOTH SHOULDERS, UNSPECIFIED OSTEOARTHRITIS TYPE: ICD-10-CM

## 2022-02-21 DIAGNOSIS — G45.9 TIA (TRANSIENT ISCHEMIC ATTACK): Primary | ICD-10-CM

## 2022-02-21 LAB
ALBUMIN SERPL-MCNC: 3.8 G/DL (ref 3.5–5.2)
ALBUMIN/GLOB SERPL: 1.2 G/DL
ALP SERPL-CCNC: 80 U/L (ref 39–117)
ALT SERPL W P-5'-P-CCNC: 14 U/L (ref 1–33)
ANION GAP SERPL CALCULATED.3IONS-SCNC: 8 MMOL/L (ref 5–15)
ANION GAP SERPL CALCULATED.3IONS-SCNC: 9 MMOL/L (ref 5–15)
AST SERPL-CCNC: 19 U/L (ref 1–32)
BASOPHILS # BLD AUTO: 0.1 10*3/MM3 (ref 0–0.2)
BASOPHILS NFR BLD AUTO: 0.8 % (ref 0–1.5)
BILIRUB SERPL-MCNC: 0.7 MG/DL (ref 0–1.2)
BUN SERPL-MCNC: 14 MG/DL (ref 8–23)
BUN SERPL-MCNC: 16 MG/DL (ref 8–23)
BUN/CREAT SERPL: 15.7 (ref 7–25)
BUN/CREAT SERPL: 17.6 (ref 7–25)
CA-I SERPL ISE-MCNC: 1.18 MMOL/L (ref 1.2–1.3)
CALCIUM SPEC-SCNC: 8.7 MG/DL (ref 8.6–10.5)
CALCIUM SPEC-SCNC: 8.9 MG/DL (ref 8.6–10.5)
CHLORIDE SERPL-SCNC: 102 MMOL/L (ref 98–107)
CHLORIDE SERPL-SCNC: 104 MMOL/L (ref 98–107)
CHOLEST SERPL-MCNC: 196 MG/DL (ref 0–200)
CK SERPL-CCNC: 64 U/L (ref 20–180)
CO2 SERPL-SCNC: 25 MMOL/L (ref 22–29)
CO2 SERPL-SCNC: 27 MMOL/L (ref 22–29)
CREAT SERPL-MCNC: 0.89 MG/DL (ref 0.57–1)
CREAT SERPL-MCNC: 0.91 MG/DL (ref 0.57–1)
D-LACTATE SERPL-SCNC: 1.3 MMOL/L (ref 0.5–2)
DEPRECATED RDW RBC AUTO: 40.7 FL (ref 37–54)
DEPRECATED RDW RBC AUTO: 41.6 FL (ref 37–54)
EOSINOPHIL # BLD AUTO: 0.1 10*3/MM3 (ref 0–0.4)
EOSINOPHIL NFR BLD AUTO: 1.4 % (ref 0.3–6.2)
ERYTHROCYTE [DISTWIDTH] IN BLOOD BY AUTOMATED COUNT: 14.1 % (ref 12.3–15.4)
ERYTHROCYTE [DISTWIDTH] IN BLOOD BY AUTOMATED COUNT: 14.4 % (ref 12.3–15.4)
ERYTHROCYTE [SEDIMENTATION RATE] IN BLOOD: 13 MM/HR (ref 0–30)
GFR SERPL CREATININE-BSD FRML MDRD: 62 ML/MIN/1.73
GFR SERPL CREATININE-BSD FRML MDRD: 64 ML/MIN/1.73
GLOBULIN UR ELPH-MCNC: 3.1 GM/DL
GLUCOSE SERPL-MCNC: 184 MG/DL (ref 65–99)
GLUCOSE SERPL-MCNC: 95 MG/DL (ref 65–99)
HCT VFR BLD AUTO: 33.5 % (ref 34–46.6)
HCT VFR BLD AUTO: 35.8 % (ref 34–46.6)
HDLC SERPL-MCNC: 26 MG/DL (ref 40–60)
HGB BLD-MCNC: 11.4 G/DL (ref 12–15.9)
HGB BLD-MCNC: 12.1 G/DL (ref 12–15.9)
IRON 24H UR-MRATE: 107 MCG/DL (ref 37–145)
IRON SATN MFR SERPL: 29 % (ref 20–50)
LDLC SERPL CALC-MCNC: 123 MG/DL (ref 0–100)
LDLC/HDLC SERPL: 4.5 {RATIO}
LYMPHOCYTES # BLD AUTO: 2.5 10*3/MM3 (ref 0.7–3.1)
LYMPHOCYTES NFR BLD AUTO: 37.7 % (ref 19.6–45.3)
MAGNESIUM SERPL-MCNC: 2 MG/DL (ref 1.6–2.4)
MCH RBC QN AUTO: 27.7 PG (ref 26.6–33)
MCH RBC QN AUTO: 27.7 PG (ref 26.6–33)
MCHC RBC AUTO-ENTMCNC: 33.9 G/DL (ref 31.5–35.7)
MCHC RBC AUTO-ENTMCNC: 34.1 G/DL (ref 31.5–35.7)
MCV RBC AUTO: 81.4 FL (ref 79–97)
MCV RBC AUTO: 81.5 FL (ref 79–97)
MONOCYTES # BLD AUTO: 0.3 10*3/MM3 (ref 0.1–0.9)
MONOCYTES NFR BLD AUTO: 5.1 % (ref 5–12)
NEUTROPHILS NFR BLD AUTO: 3.6 10*3/MM3 (ref 1.7–7)
NEUTROPHILS NFR BLD AUTO: 55 % (ref 42.7–76)
NRBC BLD AUTO-RTO: 0 /100 WBC (ref 0–0.2)
NT-PROBNP SERPL-MCNC: 279.1 PG/ML (ref 0–900)
PHOSPHATE SERPL-MCNC: 3.1 MG/DL (ref 2.5–4.5)
PLATELET # BLD AUTO: 209 10*3/MM3 (ref 140–450)
PLATELET # BLD AUTO: 249 10*3/MM3 (ref 140–450)
PMV BLD AUTO: 8.9 FL (ref 6–12)
PMV BLD AUTO: 9 FL (ref 6–12)
POTASSIUM SERPL-SCNC: 3.5 MMOL/L (ref 3.5–5.2)
POTASSIUM SERPL-SCNC: 4.1 MMOL/L (ref 3.5–5.2)
PROCALCITONIN SERPL-MCNC: 0.06 NG/ML (ref 0–0.25)
PROT SERPL-MCNC: 6.9 G/DL (ref 6–8.5)
RBC # BLD AUTO: 4.12 10*6/MM3 (ref 3.77–5.28)
RBC # BLD AUTO: 4.39 10*6/MM3 (ref 3.77–5.28)
SARS-COV-2 RNA PNL SPEC NAA+PROBE: NOT DETECTED
SODIUM SERPL-SCNC: 137 MMOL/L (ref 136–145)
SODIUM SERPL-SCNC: 138 MMOL/L (ref 136–145)
TIBC SERPL-MCNC: 364 MCG/DL (ref 298–536)
TRANSFERRIN SERPL-MCNC: 244 MG/DL (ref 200–360)
TRIGL SERPL-MCNC: 265 MG/DL (ref 0–150)
TROPONIN T SERPL-MCNC: <0.01 NG/ML (ref 0–0.03)
TSH SERPL DL<=0.05 MIU/L-ACNC: 7.29 UIU/ML (ref 0.27–4.2)
VLDLC SERPL-MCNC: 47 MG/DL (ref 5–40)
WBC NRBC COR # BLD: 4.8 10*3/MM3 (ref 3.4–10.8)
WBC NRBC COR # BLD: 6.6 10*3/MM3 (ref 3.4–10.8)

## 2022-02-21 PROCEDURE — G0378 HOSPITAL OBSERVATION PER HR: HCPCS

## 2022-02-21 PROCEDURE — 85025 COMPLETE CBC W/AUTO DIFF WBC: CPT | Performed by: EMERGENCY MEDICINE

## 2022-02-21 PROCEDURE — 82550 ASSAY OF CK (CPK): CPT | Performed by: INTERNAL MEDICINE

## 2022-02-21 PROCEDURE — 99284 EMERGENCY DEPT VISIT MOD MDM: CPT

## 2022-02-21 PROCEDURE — 99223 1ST HOSP IP/OBS HIGH 75: CPT | Performed by: INTERNAL MEDICINE

## 2022-02-21 PROCEDURE — 80061 LIPID PANEL: CPT | Performed by: INTERNAL MEDICINE

## 2022-02-21 PROCEDURE — 83735 ASSAY OF MAGNESIUM: CPT | Performed by: INTERNAL MEDICINE

## 2022-02-21 PROCEDURE — 85652 RBC SED RATE AUTOMATED: CPT | Performed by: EMERGENCY MEDICINE

## 2022-02-21 PROCEDURE — 83036 HEMOGLOBIN GLYCOSYLATED A1C: CPT | Performed by: INTERNAL MEDICINE

## 2022-02-21 PROCEDURE — 83880 ASSAY OF NATRIURETIC PEPTIDE: CPT | Performed by: INTERNAL MEDICINE

## 2022-02-21 PROCEDURE — 71045 X-RAY EXAM CHEST 1 VIEW: CPT

## 2022-02-21 PROCEDURE — 93005 ELECTROCARDIOGRAM TRACING: CPT | Performed by: EMERGENCY MEDICINE

## 2022-02-21 PROCEDURE — 36415 COLL VENOUS BLD VENIPUNCTURE: CPT | Performed by: INTERNAL MEDICINE

## 2022-02-21 PROCEDURE — 82330 ASSAY OF CALCIUM: CPT | Performed by: INTERNAL MEDICINE

## 2022-02-21 PROCEDURE — 70450 CT HEAD/BRAIN W/O DYE: CPT

## 2022-02-21 PROCEDURE — 84443 ASSAY THYROID STIM HORMONE: CPT | Performed by: EMERGENCY MEDICINE

## 2022-02-21 PROCEDURE — 83540 ASSAY OF IRON: CPT | Performed by: INTERNAL MEDICINE

## 2022-02-21 PROCEDURE — 84466 ASSAY OF TRANSFERRIN: CPT | Performed by: INTERNAL MEDICINE

## 2022-02-21 PROCEDURE — 84100 ASSAY OF PHOSPHORUS: CPT | Performed by: INTERNAL MEDICINE

## 2022-02-21 PROCEDURE — 85027 COMPLETE CBC AUTOMATED: CPT | Performed by: INTERNAL MEDICINE

## 2022-02-21 PROCEDURE — 87635 SARS-COV-2 COVID-19 AMP PRB: CPT | Performed by: EMERGENCY MEDICINE

## 2022-02-21 PROCEDURE — 80053 COMPREHEN METABOLIC PANEL: CPT | Performed by: EMERGENCY MEDICINE

## 2022-02-21 PROCEDURE — 84145 PROCALCITONIN (PCT): CPT | Performed by: INTERNAL MEDICINE

## 2022-02-21 PROCEDURE — 84484 ASSAY OF TROPONIN QUANT: CPT | Performed by: EMERGENCY MEDICINE

## 2022-02-21 PROCEDURE — 83605 ASSAY OF LACTIC ACID: CPT | Performed by: INTERNAL MEDICINE

## 2022-02-21 RX ORDER — ANASTROZOLE 1 MG/1
1 TABLET ORAL DAILY
Status: DISCONTINUED | OUTPATIENT
Start: 2022-02-22 | End: 2022-02-26 | Stop reason: HOSPADM

## 2022-02-21 RX ORDER — AMLODIPINE BESYLATE 5 MG/1
10 TABLET ORAL DAILY
Status: DISCONTINUED | OUTPATIENT
Start: 2022-02-21 | End: 2022-02-26

## 2022-02-21 RX ORDER — SUCRALFATE 1 G/1
1 TABLET ORAL
Status: DISCONTINUED | OUTPATIENT
Start: 2022-02-21 | End: 2022-02-26 | Stop reason: HOSPADM

## 2022-02-21 RX ORDER — SODIUM CHLORIDE 0.9 % (FLUSH) 0.9 %
10 SYRINGE (ML) INJECTION AS NEEDED
Status: DISCONTINUED | OUTPATIENT
Start: 2022-02-21 | End: 2022-02-26 | Stop reason: HOSPADM

## 2022-02-21 RX ORDER — PROMETHAZINE HYDROCHLORIDE 25 MG/1
25 TABLET ORAL EVERY 6 HOURS PRN
Status: DISCONTINUED | OUTPATIENT
Start: 2022-02-21 | End: 2022-02-26 | Stop reason: HOSPADM

## 2022-02-21 RX ORDER — FERROUS SULFATE TAB EC 324 MG (65 MG FE EQUIVALENT) 324 (65 FE) MG
324 TABLET DELAYED RESPONSE ORAL
Status: DISCONTINUED | OUTPATIENT
Start: 2022-02-22 | End: 2022-02-26 | Stop reason: HOSPADM

## 2022-02-21 RX ORDER — CLOPIDOGREL BISULFATE 75 MG/1
75 TABLET ORAL ONCE
Status: COMPLETED | OUTPATIENT
Start: 2022-02-21 | End: 2022-02-21

## 2022-02-21 RX ORDER — ACETAMINOPHEN 325 MG/1
650 TABLET ORAL EVERY 4 HOURS PRN
Status: DISCONTINUED | OUTPATIENT
Start: 2022-02-21 | End: 2022-02-26 | Stop reason: HOSPADM

## 2022-02-21 RX ORDER — LEVOTHYROXINE SODIUM 0.2 MG/1
200 TABLET ORAL
Status: DISCONTINUED | OUTPATIENT
Start: 2022-02-22 | End: 2022-02-22 | Stop reason: DRUGHIGH

## 2022-02-21 RX ORDER — SODIUM CHLORIDE 0.9 % (FLUSH) 0.9 %
10 SYRINGE (ML) INJECTION EVERY 12 HOURS SCHEDULED
Status: DISCONTINUED | OUTPATIENT
Start: 2022-02-21 | End: 2022-02-26 | Stop reason: HOSPADM

## 2022-02-21 RX ORDER — POTASSIUM CHLORIDE 1.5 G/1.77G
20 POWDER, FOR SOLUTION ORAL DAILY
Status: DISCONTINUED | OUTPATIENT
Start: 2022-02-22 | End: 2022-02-23

## 2022-02-21 RX ORDER — BISACODYL 5 MG/1
5 TABLET, DELAYED RELEASE ORAL DAILY PRN
Status: DISCONTINUED | OUTPATIENT
Start: 2022-02-21 | End: 2022-02-26 | Stop reason: HOSPADM

## 2022-02-21 RX ORDER — PANTOPRAZOLE SODIUM 40 MG/1
40 TABLET, DELAYED RELEASE ORAL EVERY MORNING
Status: DISCONTINUED | OUTPATIENT
Start: 2022-02-22 | End: 2022-02-22

## 2022-02-21 RX ORDER — HYDROCODONE BITARTRATE AND ACETAMINOPHEN 7.5; 325 MG/1; MG/1
2 TABLET ORAL EVERY 4 HOURS PRN
Status: DISCONTINUED | OUTPATIENT
Start: 2022-02-21 | End: 2022-02-26 | Stop reason: HOSPADM

## 2022-02-21 RX ORDER — HYDROCODONE BITARTRATE AND ACETAMINOPHEN 7.5; 325 MG/1; MG/1
1 TABLET ORAL EVERY 8 HOURS PRN
Status: DISCONTINUED | OUTPATIENT
Start: 2022-02-21 | End: 2022-02-26 | Stop reason: HOSPADM

## 2022-02-21 RX ORDER — ESCITALOPRAM OXALATE 10 MG/1
10 TABLET ORAL DAILY
Status: DISCONTINUED | OUTPATIENT
Start: 2022-02-22 | End: 2022-02-26 | Stop reason: HOSPADM

## 2022-02-21 RX ORDER — LISINOPRIL 5 MG/1
5 TABLET ORAL DAILY
Status: DISCONTINUED | OUTPATIENT
Start: 2022-02-22 | End: 2022-02-26 | Stop reason: HOSPADM

## 2022-02-21 RX ORDER — GABAPENTIN 100 MG/1
100 CAPSULE ORAL EVERY 8 HOURS SCHEDULED
Status: DISCONTINUED | OUTPATIENT
Start: 2022-02-21 | End: 2022-02-26 | Stop reason: HOSPADM

## 2022-02-21 RX ORDER — BISACODYL 10 MG
10 SUPPOSITORY, RECTAL RECTAL DAILY PRN
Status: DISCONTINUED | OUTPATIENT
Start: 2022-02-21 | End: 2022-02-26 | Stop reason: HOSPADM

## 2022-02-21 RX ORDER — NITROGLYCERIN 0.4 MG/1
0.4 TABLET SUBLINGUAL
Status: DISCONTINUED | OUTPATIENT
Start: 2022-02-21 | End: 2022-02-26 | Stop reason: HOSPADM

## 2022-02-21 RX ORDER — AMOXICILLIN 250 MG
2 CAPSULE ORAL 2 TIMES DAILY
Status: DISCONTINUED | OUTPATIENT
Start: 2022-02-21 | End: 2022-02-26 | Stop reason: HOSPADM

## 2022-02-21 RX ORDER — ASPIRIN 325 MG
325 TABLET ORAL ONCE
Status: COMPLETED | OUTPATIENT
Start: 2022-02-21 | End: 2022-02-21

## 2022-02-21 RX ORDER — HYDROCODONE BITARTRATE AND ACETAMINOPHEN 7.5; 325 MG/1; MG/1
1 TABLET ORAL EVERY 8 HOURS PRN
Status: DISCONTINUED | OUTPATIENT
Start: 2022-02-21 | End: 2022-02-21 | Stop reason: SDUPTHER

## 2022-02-21 RX ORDER — NEBIVOLOL 10 MG/1
20 TABLET ORAL DAILY
Status: DISCONTINUED | OUTPATIENT
Start: 2022-02-22 | End: 2022-02-23

## 2022-02-21 RX ORDER — POLYETHYLENE GLYCOL 3350 17 G/17G
17 POWDER, FOR SOLUTION ORAL DAILY PRN
Status: DISCONTINUED | OUTPATIENT
Start: 2022-02-21 | End: 2022-02-26 | Stop reason: HOSPADM

## 2022-02-21 RX ADMIN — ASPIRIN 325 MG ORAL TABLET 325 MG: 325 PILL ORAL at 17:00

## 2022-02-21 RX ADMIN — CLOPIDOGREL BISULFATE 75 MG: 75 TABLET ORAL at 17:00

## 2022-02-22 ENCOUNTER — APPOINTMENT (OUTPATIENT)
Dept: MRI IMAGING | Facility: HOSPITAL | Age: 62
End: 2022-02-22

## 2022-02-22 ENCOUNTER — APPOINTMENT (OUTPATIENT)
Dept: CARDIOLOGY | Facility: HOSPITAL | Age: 62
End: 2022-02-22

## 2022-02-22 LAB
HBA1C MFR BLD: 7.1 % (ref 3.5–5.6)
QT INTERVAL: 498 MS

## 2022-02-22 PROCEDURE — 25010000002 CALCIUM GLUCONATE-NACL 1-0.675 GM/50ML-% SOLUTION: Performed by: INTERNAL MEDICINE

## 2022-02-22 PROCEDURE — 99222 1ST HOSP IP/OBS MODERATE 55: CPT | Performed by: PSYCHIATRY & NEUROLOGY

## 2022-02-22 PROCEDURE — 70553 MRI BRAIN STEM W/O & W/DYE: CPT

## 2022-02-22 PROCEDURE — A9579 GAD-BASE MR CONTRAST NOS,1ML: HCPCS | Performed by: INTERNAL MEDICINE

## 2022-02-22 PROCEDURE — 63710000001 PROMETHAZINE PER 25 MG: Performed by: INTERNAL MEDICINE

## 2022-02-22 PROCEDURE — G0378 HOSPITAL OBSERVATION PER HR: HCPCS

## 2022-02-22 PROCEDURE — 99232 SBSQ HOSP IP/OBS MODERATE 35: CPT | Performed by: INTERNAL MEDICINE

## 2022-02-22 PROCEDURE — 25010000002 GADOTERIDOL PER 1 ML: Performed by: INTERNAL MEDICINE

## 2022-02-22 PROCEDURE — 93306 TTE W/DOPPLER COMPLETE: CPT

## 2022-02-22 PROCEDURE — 93306 TTE W/DOPPLER COMPLETE: CPT | Performed by: INTERNAL MEDICINE

## 2022-02-22 RX ORDER — LEVOTHYROXINE SODIUM 0.12 MG/1
250 TABLET ORAL
Status: DISCONTINUED | OUTPATIENT
Start: 2022-02-22 | End: 2022-02-26 | Stop reason: HOSPADM

## 2022-02-22 RX ORDER — CALCIUM GLUCONATE 20 MG/ML
1 INJECTION, SOLUTION INTRAVENOUS ONCE
Status: COMPLETED | OUTPATIENT
Start: 2022-02-22 | End: 2022-02-22

## 2022-02-22 RX ADMIN — SUCRALFATE 1 G: 1 TABLET ORAL at 10:43

## 2022-02-22 RX ADMIN — GABAPENTIN 100 MG: 100 CAPSULE ORAL at 14:04

## 2022-02-22 RX ADMIN — CALCIUM GLUCONATE 1 G: 20 INJECTION, SOLUTION INTRAVENOUS at 21:02

## 2022-02-22 RX ADMIN — SENNOSIDES AND DOCUSATE SODIUM 2 TABLET: 50; 8.6 TABLET ORAL at 10:44

## 2022-02-22 RX ADMIN — Medication 10 ML: at 21:01

## 2022-02-22 RX ADMIN — NEBIVOLOL 20 MG: 10 TABLET ORAL at 10:44

## 2022-02-22 RX ADMIN — ANASTROZOLE 1 MG: 1 TABLET ORAL at 10:44

## 2022-02-22 RX ADMIN — SENNOSIDES AND DOCUSATE SODIUM 2 TABLET: 50; 8.6 TABLET ORAL at 21:01

## 2022-02-22 RX ADMIN — HYDROCODONE BITARTRATE AND ACETAMINOPHEN 1 TABLET: 7.5; 325 TABLET ORAL at 03:07

## 2022-02-22 RX ADMIN — AMLODIPINE BESYLATE 10 MG: 5 TABLET ORAL at 00:55

## 2022-02-22 RX ADMIN — Medication 10 ML: at 00:56

## 2022-02-22 RX ADMIN — GABAPENTIN 100 MG: 100 CAPSULE ORAL at 05:17

## 2022-02-22 RX ADMIN — SUCRALFATE 1 G: 1 TABLET ORAL at 00:55

## 2022-02-22 RX ADMIN — ESCITALOPRAM OXALATE 10 MG: 10 TABLET ORAL at 10:44

## 2022-02-22 RX ADMIN — HYDROCODONE BITARTRATE AND ACETAMINOPHEN 1 TABLET: 7.5; 325 TABLET ORAL at 01:29

## 2022-02-22 RX ADMIN — POTASSIUM CHLORIDE 20 MEQ: 1.5 POWDER, FOR SOLUTION ORAL at 10:44

## 2022-02-22 RX ADMIN — FERROUS SULFATE TAB EC 324 MG (65 MG FE EQUIVALENT) 324 MG: 324 (65 FE) TABLET DELAYED RESPONSE at 10:44

## 2022-02-22 RX ADMIN — PROMETHAZINE HYDROCHLORIDE 25 MG: 25 TABLET ORAL at 02:49

## 2022-02-22 RX ADMIN — SUCRALFATE 1 G: 1 TABLET ORAL at 17:12

## 2022-02-22 RX ADMIN — GABAPENTIN 100 MG: 100 CAPSULE ORAL at 00:55

## 2022-02-22 RX ADMIN — GADOTERIDOL 14 ML: 279.3 INJECTION, SOLUTION INTRAVENOUS at 13:46

## 2022-02-22 RX ADMIN — SENNOSIDES AND DOCUSATE SODIUM 2 TABLET: 50; 8.6 TABLET ORAL at 00:55

## 2022-02-22 RX ADMIN — Medication 10 ML: at 10:45

## 2022-02-22 RX ADMIN — LISINOPRIL 5 MG: 5 TABLET ORAL at 10:44

## 2022-02-22 RX ADMIN — GABAPENTIN 100 MG: 100 CAPSULE ORAL at 21:02

## 2022-02-23 PROBLEM — I49.5 SICK SINUS SYNDROME (HCC): Status: ACTIVE | Noted: 2022-02-21

## 2022-02-23 LAB
ANION GAP SERPL CALCULATED.3IONS-SCNC: 11 MMOL/L (ref 5–15)
BH CV ECHO MEAS - ACS: 1.77 CM
BH CV ECHO MEAS - AO MAX PG: 2.8 MMHG
BH CV ECHO MEAS - AO MEAN PG: 1.82 MMHG
BH CV ECHO MEAS - AO ROOT DIAM: 3 CM
BH CV ECHO MEAS - AO V2 MAX: 84.3 CM/SEC
BH CV ECHO MEAS - AO V2 VTI: 18.1 CM
BH CV ECHO MEAS - AVA(I,D): 3.2 CM2
BH CV ECHO MEAS - EDV(CUBED): 123.7 ML
BH CV ECHO MEAS - EDV(MOD-SP4): 72.5 ML
BH CV ECHO MEAS - EF(MOD-BP): 64 %
BH CV ECHO MEAS - EF(MOD-SP4): 63.9 %
BH CV ECHO MEAS - ESV(CUBED): 21.2 ML
BH CV ECHO MEAS - ESV(MOD-SP4): 26.1 ML
BH CV ECHO MEAS - FS: 44.4 %
BH CV ECHO MEAS - IVS/LVPW: 1.01 CM
BH CV ECHO MEAS - IVSD: 1.11 CM
BH CV ECHO MEAS - LA DIMENSION(2D): 3.5 CM
BH CV ECHO MEAS - LV DIASTOLIC VOL/BSA (35-75): 41.2 CM2
BH CV ECHO MEAS - LV MASS(C)D: 205.6 GRAMS
BH CV ECHO MEAS - LV MAX PG: 2.9 MMHG
BH CV ECHO MEAS - LV MEAN PG: 1.54 MMHG
BH CV ECHO MEAS - LV SYSTOLIC VOL/BSA (12-30): 14.9 CM2
BH CV ECHO MEAS - LV V1 MAX: 85.5 CM/SEC
BH CV ECHO MEAS - LV V1 VTI: 20.7 CM
BH CV ECHO MEAS - LVIDD: 5 CM
BH CV ECHO MEAS - LVIDS: 2.8 CM
BH CV ECHO MEAS - LVOT AREA: 2.8 CM2
BH CV ECHO MEAS - LVOT DIAM: 1.89 CM
BH CV ECHO MEAS - LVPWD: 1.09 CM
BH CV ECHO MEAS - MV A MAX VEL: 116.5 CM/SEC
BH CV ECHO MEAS - MV DEC SLOPE: 450.7 CM/SEC2
BH CV ECHO MEAS - MV DEC TIME: 0.21 MSEC
BH CV ECHO MEAS - MV E MAX VEL: 47.3 CM/SEC
BH CV ECHO MEAS - MV E/A: 0.41
BH CV ECHO MEAS - MV MAX PG: 4.1 MMHG
BH CV ECHO MEAS - MV MEAN PG: 1.46 MMHG
BH CV ECHO MEAS - MV V2 VTI: 25.5 CM
BH CV ECHO MEAS - MVA(VTI): 2.27 CM2
BH CV ECHO MEAS - PA ACC TIME: 0.07 SEC
BH CV ECHO MEAS - PA PR(ACCEL): 46.3 MMHG
BH CV ECHO MEAS - PA V2 MAX: 81.1 CM/SEC
BH CV ECHO MEAS - PULM A REVS DUR: 0.1 SEC
BH CV ECHO MEAS - PULM A REVS VEL: 33 CM/SEC
BH CV ECHO MEAS - PULM DIAS VEL: 40.3 CM/SEC
BH CV ECHO MEAS - PULM SYS VEL: 66.6 CM/SEC
BH CV ECHO MEAS - RAP SYSTOLE: 8 MMHG
BH CV ECHO MEAS - RV MAX PG: 1.84 MMHG
BH CV ECHO MEAS - RV V1 MAX: 67.8 CM/SEC
BH CV ECHO MEAS - RV V1 VTI: 18.5 CM
BH CV ECHO MEAS - RVDD: 2.5 CM
BH CV ECHO MEAS - RVSP: 45.4 MMHG
BH CV ECHO MEAS - SI(MOD-SP4): 26.3 ML/M2
BH CV ECHO MEAS - SV(LVOT): 58 ML
BH CV ECHO MEAS - SV(MOD-SP4): 46.3 ML
BH CV ECHO MEAS - TR MAX PG: 37.4 MMHG
BH CV ECHO MEAS - TR MAX VEL: 305.8 CM/SEC
BUN SERPL-MCNC: 18 MG/DL (ref 8–23)
BUN/CREAT SERPL: 18.9 (ref 7–25)
CALCIUM SPEC-SCNC: 8.5 MG/DL (ref 8.6–10.5)
CHLORIDE SERPL-SCNC: 101 MMOL/L (ref 98–107)
CO2 SERPL-SCNC: 23 MMOL/L (ref 22–29)
CREAT SERPL-MCNC: 0.95 MG/DL (ref 0.57–1)
GFR SERPL CREATININE-BSD FRML MDRD: 59 ML/MIN/1.73
GLUCOSE BLDC GLUCOMTR-MCNC: 118 MG/DL (ref 70–105)
GLUCOSE BLDC GLUCOMTR-MCNC: 122 MG/DL (ref 70–105)
GLUCOSE BLDC GLUCOMTR-MCNC: 127 MG/DL (ref 70–105)
GLUCOSE BLDC GLUCOMTR-MCNC: 175 MG/DL (ref 70–105)
GLUCOSE BLDC GLUCOMTR-MCNC: 176 MG/DL (ref 70–105)
GLUCOSE SERPL-MCNC: 177 MG/DL (ref 65–99)
MAGNESIUM SERPL-MCNC: 2 MG/DL (ref 1.6–2.4)
MAXIMAL PREDICTED HEART RATE: 157 BPM
PHOSPHATE SERPL-MCNC: 3.3 MG/DL (ref 2.5–4.5)
POTASSIUM SERPL-SCNC: 3.4 MMOL/L (ref 3.5–5.2)
SODIUM SERPL-SCNC: 135 MMOL/L (ref 136–145)
STRESS TARGET HR: 133 BPM
VIT B12 BLD-MCNC: 254 PG/ML (ref 211–946)

## 2022-02-23 PROCEDURE — 82607 VITAMIN B-12: CPT | Performed by: PSYCHIATRY & NEUROLOGY

## 2022-02-23 PROCEDURE — 83735 ASSAY OF MAGNESIUM: CPT | Performed by: INTERNAL MEDICINE

## 2022-02-23 PROCEDURE — 36415 COLL VENOUS BLD VENIPUNCTURE: CPT | Performed by: INTERNAL MEDICINE

## 2022-02-23 PROCEDURE — G0378 HOSPITAL OBSERVATION PER HR: HCPCS

## 2022-02-23 PROCEDURE — 84100 ASSAY OF PHOSPHORUS: CPT | Performed by: INTERNAL MEDICINE

## 2022-02-23 PROCEDURE — 99232 SBSQ HOSP IP/OBS MODERATE 35: CPT | Performed by: INTERNAL MEDICINE

## 2022-02-23 PROCEDURE — 99203 OFFICE O/P NEW LOW 30 MIN: CPT | Performed by: INTERNAL MEDICINE

## 2022-02-23 PROCEDURE — 82962 GLUCOSE BLOOD TEST: CPT

## 2022-02-23 PROCEDURE — 99232 SBSQ HOSP IP/OBS MODERATE 35: CPT | Performed by: PSYCHIATRY & NEUROLOGY

## 2022-02-23 PROCEDURE — 97162 PT EVAL MOD COMPLEX 30 MIN: CPT

## 2022-02-23 PROCEDURE — 63710000001 PROMETHAZINE PER 25 MG: Performed by: INTERNAL MEDICINE

## 2022-02-23 PROCEDURE — 80048 BASIC METABOLIC PNL TOTAL CA: CPT | Performed by: INTERNAL MEDICINE

## 2022-02-23 RX ORDER — POTASSIUM CHLORIDE 1.5 G/1.77G
40 POWDER, FOR SOLUTION ORAL DAILY
Status: DISCONTINUED | OUTPATIENT
Start: 2022-02-24 | End: 2022-02-26 | Stop reason: HOSPADM

## 2022-02-23 RX ORDER — NEBIVOLOL 5 MG/1
5 TABLET ORAL DAILY
Status: DISCONTINUED | OUTPATIENT
Start: 2022-02-24 | End: 2022-02-26 | Stop reason: HOSPADM

## 2022-02-23 RX ORDER — ATORVASTATIN CALCIUM 20 MG/1
20 TABLET, FILM COATED ORAL NIGHTLY
Status: DISCONTINUED | OUTPATIENT
Start: 2022-02-23 | End: 2022-02-26 | Stop reason: HOSPADM

## 2022-02-23 RX ADMIN — PROMETHAZINE HYDROCHLORIDE 25 MG: 25 TABLET ORAL at 08:44

## 2022-02-23 RX ADMIN — GABAPENTIN 100 MG: 100 CAPSULE ORAL at 21:27

## 2022-02-23 RX ADMIN — ANASTROZOLE 1 MG: 1 TABLET ORAL at 10:30

## 2022-02-23 RX ADMIN — HYDROCODONE BITARTRATE AND ACETAMINOPHEN 2 TABLET: 7.5; 325 TABLET ORAL at 08:43

## 2022-02-23 RX ADMIN — SUCRALFATE 1 G: 1 TABLET ORAL at 18:19

## 2022-02-23 RX ADMIN — ATORVASTATIN CALCIUM 20 MG: 20 TABLET, FILM COATED ORAL at 21:27

## 2022-02-23 RX ADMIN — SUCRALFATE 1 G: 1 TABLET ORAL at 10:30

## 2022-02-23 RX ADMIN — LEVOTHYROXINE SODIUM 250 MCG: 0.12 TABLET ORAL at 05:57

## 2022-02-23 RX ADMIN — GABAPENTIN 100 MG: 100 CAPSULE ORAL at 13:21

## 2022-02-23 RX ADMIN — HYDROCODONE BITARTRATE AND ACETAMINOPHEN 2 TABLET: 7.5; 325 TABLET ORAL at 18:25

## 2022-02-23 RX ADMIN — POTASSIUM CHLORIDE 20 MEQ: 1.5 POWDER, FOR SOLUTION ORAL at 08:44

## 2022-02-23 RX ADMIN — Medication 10 ML: at 08:45

## 2022-02-23 RX ADMIN — CALCIUM CARBONATE-VITAMIN D TAB 500 MG-200 UNIT 1 TABLET: 500-200 TAB at 08:44

## 2022-02-23 RX ADMIN — SUCRALFATE 1 G: 1 TABLET ORAL at 08:44

## 2022-02-23 RX ADMIN — SENNOSIDES AND DOCUSATE SODIUM 2 TABLET: 50; 8.6 TABLET ORAL at 08:43

## 2022-02-23 RX ADMIN — GABAPENTIN 100 MG: 100 CAPSULE ORAL at 05:57

## 2022-02-23 RX ADMIN — HYDROCODONE BITARTRATE AND ACETAMINOPHEN 1 TABLET: 7.5; 325 TABLET ORAL at 00:16

## 2022-02-23 RX ADMIN — HYDROCODONE BITARTRATE AND ACETAMINOPHEN 1 TABLET: 7.5; 325 TABLET ORAL at 21:27

## 2022-02-23 RX ADMIN — ESCITALOPRAM OXALATE 10 MG: 10 TABLET ORAL at 08:44

## 2022-02-23 RX ADMIN — HYDROCODONE BITARTRATE AND ACETAMINOPHEN 1 TABLET: 7.5; 325 TABLET ORAL at 13:21

## 2022-02-23 RX ADMIN — PROMETHAZINE HYDROCHLORIDE 25 MG: 25 TABLET ORAL at 00:16

## 2022-02-23 RX ADMIN — Medication 10 ML: at 21:29

## 2022-02-23 RX ADMIN — FERROUS SULFATE TAB EC 324 MG (65 MG FE EQUIVALENT) 324 MG: 324 (65 FE) TABLET DELAYED RESPONSE at 08:43

## 2022-02-23 RX ADMIN — PROMETHAZINE HYDROCHLORIDE 25 MG: 25 TABLET ORAL at 18:23

## 2022-02-23 RX ADMIN — LISINOPRIL 5 MG: 5 TABLET ORAL at 08:43

## 2022-02-24 ENCOUNTER — TELEPHONE (OUTPATIENT)
Dept: PAIN MEDICINE | Facility: CLINIC | Age: 62
End: 2022-02-24

## 2022-02-24 ENCOUNTER — ANESTHESIA (OUTPATIENT)
Dept: CARDIOLOGY | Facility: HOSPITAL | Age: 62
End: 2022-02-24

## 2022-02-24 ENCOUNTER — ANESTHESIA EVENT (OUTPATIENT)
Dept: CARDIOLOGY | Facility: HOSPITAL | Age: 62
End: 2022-02-24

## 2022-02-24 LAB
APTT PPP: 24.2 SECONDS (ref 24–31)
GLUCOSE BLDC GLUCOMTR-MCNC: 119 MG/DL (ref 70–105)
GLUCOSE BLDC GLUCOMTR-MCNC: 129 MG/DL (ref 70–105)
GLUCOSE BLDC GLUCOMTR-MCNC: 160 MG/DL (ref 70–105)
INR PPP: <0.93 (ref 0.93–1.1)
PROTHROMBIN TIME: 10.1 SECONDS (ref 9.6–11.7)

## 2022-02-24 PROCEDURE — 82962 GLUCOSE BLOOD TEST: CPT

## 2022-02-24 PROCEDURE — 85610 PROTHROMBIN TIME: CPT | Performed by: RADIOLOGY

## 2022-02-24 PROCEDURE — 99024 POSTOP FOLLOW-UP VISIT: CPT | Performed by: INTERNAL MEDICINE

## 2022-02-24 PROCEDURE — 63710000001 PROMETHAZINE PER 25 MG: Performed by: INTERNAL MEDICINE

## 2022-02-24 PROCEDURE — 85730 THROMBOPLASTIN TIME PARTIAL: CPT | Performed by: RADIOLOGY

## 2022-02-24 PROCEDURE — 99232 SBSQ HOSP IP/OBS MODERATE 35: CPT | Performed by: INTERNAL MEDICINE

## 2022-02-24 RX ORDER — SODIUM CHLORIDE 0.9 % (FLUSH) 0.9 %
3-10 SYRINGE (ML) INJECTION AS NEEDED
Status: DISCONTINUED | OUTPATIENT
Start: 2022-02-24 | End: 2022-02-25

## 2022-02-24 RX ORDER — OXYBUTYNIN CHLORIDE 5 MG/1
5 TABLET, EXTENDED RELEASE ORAL DAILY
COMMUNITY

## 2022-02-24 RX ORDER — SODIUM CHLORIDE 0.9 % (FLUSH) 0.9 %
3 SYRINGE (ML) INJECTION EVERY 12 HOURS SCHEDULED
Status: DISCONTINUED | OUTPATIENT
Start: 2022-02-24 | End: 2022-02-25

## 2022-02-24 RX ADMIN — ATORVASTATIN CALCIUM 20 MG: 20 TABLET, FILM COATED ORAL at 20:38

## 2022-02-24 RX ADMIN — GABAPENTIN 100 MG: 100 CAPSULE ORAL at 05:41

## 2022-02-24 RX ADMIN — ESCITALOPRAM OXALATE 10 MG: 10 TABLET ORAL at 09:16

## 2022-02-24 RX ADMIN — SUCRALFATE 1 G: 1 TABLET ORAL at 09:16

## 2022-02-24 RX ADMIN — PROMETHAZINE HYDROCHLORIDE 25 MG: 25 TABLET ORAL at 14:29

## 2022-02-24 RX ADMIN — ANASTROZOLE 1 MG: 1 TABLET ORAL at 09:15

## 2022-02-24 RX ADMIN — Medication 3 ML: at 20:38

## 2022-02-24 RX ADMIN — GABAPENTIN 100 MG: 100 CAPSULE ORAL at 14:24

## 2022-02-24 RX ADMIN — HYDROCODONE BITARTRATE AND ACETAMINOPHEN 2 TABLET: 7.5; 325 TABLET ORAL at 14:24

## 2022-02-24 RX ADMIN — FERROUS SULFATE TAB EC 324 MG (65 MG FE EQUIVALENT) 324 MG: 324 (65 FE) TABLET DELAYED RESPONSE at 09:16

## 2022-02-24 RX ADMIN — PROMETHAZINE HYDROCHLORIDE 25 MG: 25 TABLET ORAL at 20:39

## 2022-02-24 RX ADMIN — HYDROCODONE BITARTRATE AND ACETAMINOPHEN 1 TABLET: 7.5; 325 TABLET ORAL at 05:41

## 2022-02-24 RX ADMIN — Medication 10 ML: at 09:18

## 2022-02-24 RX ADMIN — SUCRALFATE 1 G: 1 TABLET ORAL at 17:46

## 2022-02-24 RX ADMIN — POTASSIUM CHLORIDE 40 MEQ: 1.5 POWDER, FOR SOLUTION ORAL at 09:16

## 2022-02-24 RX ADMIN — Medication 3 ML: at 09:17

## 2022-02-24 RX ADMIN — HYDROCODONE BITARTRATE AND ACETAMINOPHEN 2 TABLET: 7.5; 325 TABLET ORAL at 20:39

## 2022-02-24 RX ADMIN — GABAPENTIN 100 MG: 100 CAPSULE ORAL at 20:38

## 2022-02-24 RX ADMIN — LEVOTHYROXINE SODIUM 250 MCG: 0.12 TABLET ORAL at 05:41

## 2022-02-24 RX ADMIN — SENNOSIDES AND DOCUSATE SODIUM 2 TABLET: 50; 8.6 TABLET ORAL at 09:16

## 2022-02-24 RX ADMIN — CALCIUM CARBONATE-VITAMIN D TAB 500 MG-200 UNIT 1 TABLET: 500-200 TAB at 09:17

## 2022-02-24 NOTE — TELEPHONE ENCOUNTER
Provider: JOSÉ LUIS  Caller: ERIC  Pharmacy: Pharmacy:  [facility]/PH[facility]ACY #[id number] - [facility] [place] - [address] [place] [address] AT [facility] [address] - [phone]  - [phone] FX    Phone Number: [phone]    Reason for Call: PT IS CALLING TO APOLOGIZE FOR MISSING HER APPT.     PT IS REPORTING SHE IS IN THE HOSPITAL.     SHE IS HAVING A PACEMAKER INSULTED 2/24/22 AT 6PM.      REPORTS THEY HAVE TOLD HER SHE HAD A SMALL STROKE AND HER HEART RATE WILL NOT RISE.     PT IS ASKING ABOUT A REFIL OF HER MEDICATION FOR WHEN SHE GOES HOME.

## 2022-02-24 NOTE — ANESTHESIA PREPROCEDURE EVALUATION
Anesthesia Evaluation     Patient summary reviewed and Nursing notes reviewed   NPO Solid Status: > 8 hours  NPO Liquid Status: > 8 hours           Airway   Mallampati: II  TM distance: >3 FB  Neck ROM: full  No difficulty expected  Dental - normal exam     Pulmonary - negative pulmonary ROS and normal exam    breath sounds clear to auscultation  Cardiovascular - normal exam  Exercise tolerance: unable to assess    ECG reviewed  Rhythm: regular  Rate: normal    (+) hypertension, dysrhythmias Bradycardia, hyperlipidemia,     ROS comment: Interpretation Summary    · Estimated left ventricular EF was in agreement with the calculated left ventricular EF. Left ventricular ejection fraction appears to be 61 - 65%. Left ventricular systolic function is normal.  · Left ventricular wall thickness is consistent with borderline concentric hypertrophy.  · Left ventricular diastolic function is consistent with (grade I) impaired relaxation.  · Saline test results are negative.  · Estimated right ventricular systolic pressure from tricuspid regurgitation is mildly elevated (35-45 mmHg).        Neuro/Psych  (+) TIA, CVA residual symptoms,    GI/Hepatic/Renal/Endo    (+)   diabetes mellitus, thyroid problem hypothyroidism    Musculoskeletal     Abdominal  - normal exam   Substance History - negative use     OB/GYN negative ob/gyn ROS         Other   arthritis,    history of cancer remission                    Anesthesia Plan    ASA 3     MAC   (Patient identified; pre-operative vital signs, all relevant labs/studies, complete medical/surgical/anesthetic history, full medication list, full allergy list, and NPO status obtained/reviewed; physical assessment performed; anesthetic options, side effects, potential complications, risks, and benefits discussed; questions answered; written anesthesia consent obtained; patient cleared for procedure; anesthesia machine and equipment checked and functioning)  intravenous induction      Anesthetic plan, all risks, benefits, and alternatives have been provided, discussed and informed consent has been obtained with: patient.        CODE STATUS:    Level Of Support Discussed With: Patient  Code Status (Patient has no pulse and is not breathing): CPR (Attempt to Resuscitate)  Medical Interventions (Patient has pulse or is breathing): Full Support

## 2022-02-25 ENCOUNTER — APPOINTMENT (OUTPATIENT)
Dept: GENERAL RADIOLOGY | Facility: HOSPITAL | Age: 62
End: 2022-02-25

## 2022-02-25 ENCOUNTER — APPOINTMENT (OUTPATIENT)
Dept: INTERVENTIONAL RADIOLOGY/VASCULAR | Facility: HOSPITAL | Age: 62
End: 2022-02-25

## 2022-02-25 DIAGNOSIS — M54.16 LUMBAR RADICULOPATHY: ICD-10-CM

## 2022-02-25 DIAGNOSIS — M19.012 OSTEOARTHRITIS OF BOTH SHOULDERS, UNSPECIFIED OSTEOARTHRITIS TYPE: ICD-10-CM

## 2022-02-25 DIAGNOSIS — M19.011 OSTEOARTHRITIS OF BOTH SHOULDERS, UNSPECIFIED OSTEOARTHRITIS TYPE: ICD-10-CM

## 2022-02-25 LAB — GLUCOSE BLDC GLUCOMTR-MCNC: 112 MG/DL (ref 70–105)

## 2022-02-25 PROCEDURE — 82962 GLUCOSE BLOOD TEST: CPT

## 2022-02-25 PROCEDURE — 02H63JZ INSERTION OF PACEMAKER LEAD INTO RIGHT ATRIUM, PERCUTANEOUS APPROACH: ICD-10-PCS | Performed by: INTERNAL MEDICINE

## 2022-02-25 PROCEDURE — 99153 MOD SED SAME PHYS/QHP EA: CPT

## 2022-02-25 PROCEDURE — 33208 INSRT HEART PM ATRIAL & VENT: CPT | Performed by: INTERNAL MEDICINE

## 2022-02-25 PROCEDURE — C1785 PMKR, DUAL, RATE-RESP: HCPCS | Performed by: INTERNAL MEDICINE

## 2022-02-25 PROCEDURE — 0 MORPHINE SULFATE 4 MG/ML SOLUTION: Performed by: INTERNAL MEDICINE

## 2022-02-25 PROCEDURE — 25010000002 MIDAZOLAM PER 1 MG: Performed by: ANESTHESIOLOGY

## 2022-02-25 PROCEDURE — 0 IOPAMIDOL PER 1 ML: Performed by: INTERNAL MEDICINE

## 2022-02-25 PROCEDURE — 02HK3JZ INSERTION OF PACEMAKER LEAD INTO RIGHT VENTRICLE, PERCUTANEOUS APPROACH: ICD-10-PCS | Performed by: INTERNAL MEDICINE

## 2022-02-25 PROCEDURE — 99024 POSTOP FOLLOW-UP VISIT: CPT | Performed by: INTERNAL MEDICINE

## 2022-02-25 PROCEDURE — 25010000002 VANCOMYCIN HCL 1.25 G RECONSTITUTED SOLUTION 1 EACH VIAL: Performed by: INTERNAL MEDICINE

## 2022-02-25 PROCEDURE — 25010000002 MIDAZOLAM PER 1 MG: Performed by: RADIOLOGY

## 2022-02-25 PROCEDURE — 25010000002 FENTANYL CITRATE (PF) 100 MCG/2ML SOLUTION: Performed by: ANESTHESIOLOGY

## 2022-02-25 PROCEDURE — 25010000002 ONDANSETRON PER 1 MG: Performed by: RADIOLOGY

## 2022-02-25 PROCEDURE — 99152 MOD SED SAME PHYS/QHP 5/>YRS: CPT

## 2022-02-25 PROCEDURE — 71045 X-RAY EXAM CHEST 1 VIEW: CPT

## 2022-02-25 PROCEDURE — 99232 SBSQ HOSP IP/OBS MODERATE 35: CPT | Performed by: INTERNAL MEDICINE

## 2022-02-25 PROCEDURE — 93010 ELECTROCARDIOGRAM REPORT: CPT | Performed by: INTERNAL MEDICINE

## 2022-02-25 PROCEDURE — C1894 INTRO/SHEATH, NON-LASER: HCPCS | Performed by: INTERNAL MEDICINE

## 2022-02-25 PROCEDURE — C1898 LEAD, PMKR, OTHER THAN TRANS: HCPCS | Performed by: INTERNAL MEDICINE

## 2022-02-25 PROCEDURE — 0JH606Z INSERTION OF PACEMAKER, DUAL CHAMBER INTO CHEST SUBCUTANEOUS TISSUE AND FASCIA, OPEN APPROACH: ICD-10-PCS | Performed by: INTERNAL MEDICINE

## 2022-02-25 PROCEDURE — 25010000002 FENTANYL CITRATE (PF) 50 MCG/ML SOLUTION: Performed by: RADIOLOGY

## 2022-02-25 PROCEDURE — 25010000002 PROPOFOL 10 MG/ML EMULSION: Performed by: ANESTHESIOLOGY

## 2022-02-25 PROCEDURE — 77001 FLUOROGUIDE FOR VEIN DEVICE: CPT

## 2022-02-25 PROCEDURE — 93005 ELECTROCARDIOGRAM TRACING: CPT | Performed by: INTERNAL MEDICINE

## 2022-02-25 PROCEDURE — 63710000001 PROMETHAZINE PER 25 MG: Performed by: INTERNAL MEDICINE

## 2022-02-25 DEVICE — PACE/SENSE LEAD
Type: IMPLANTABLE DEVICE | Status: FUNCTIONAL
Brand: INGEVITY™+

## 2022-02-25 DEVICE — PACEMAKER
Type: IMPLANTABLE DEVICE | Status: FUNCTIONAL
Brand: ACCOLADE™ MRI DR

## 2022-02-25 RX ORDER — ONDANSETRON 2 MG/ML
INJECTION INTRAMUSCULAR; INTRAVENOUS
Status: COMPLETED | OUTPATIENT
Start: 2022-02-25 | End: 2022-02-25

## 2022-02-25 RX ORDER — SODIUM CHLORIDE 9 MG/ML
INJECTION, SOLUTION INTRAVENOUS CONTINUOUS PRN
Status: DISCONTINUED | OUTPATIENT
Start: 2022-02-25 | End: 2022-02-25 | Stop reason: SURG

## 2022-02-25 RX ORDER — FENTANYL CITRATE 50 UG/ML
INJECTION, SOLUTION INTRAMUSCULAR; INTRAVENOUS AS NEEDED
Status: DISCONTINUED | OUTPATIENT
Start: 2022-02-25 | End: 2022-02-25 | Stop reason: SURG

## 2022-02-25 RX ORDER — HYDROCODONE BITARTRATE AND ACETAMINOPHEN 5; 325 MG/1; MG/1
1 TABLET ORAL EVERY 4 HOURS PRN
Status: DISCONTINUED | OUTPATIENT
Start: 2022-02-25 | End: 2022-02-25 | Stop reason: SDUPTHER

## 2022-02-25 RX ORDER — MIDAZOLAM HYDROCHLORIDE 1 MG/ML
INJECTION INTRAMUSCULAR; INTRAVENOUS AS NEEDED
Status: DISCONTINUED | OUTPATIENT
Start: 2022-02-25 | End: 2022-02-25 | Stop reason: SURG

## 2022-02-25 RX ORDER — LIDOCAINE HYDROCHLORIDE AND EPINEPHRINE BITARTRATE 20; .01 MG/ML; MG/ML
INJECTION, SOLUTION SUBCUTANEOUS AS NEEDED
Status: DISCONTINUED | OUTPATIENT
Start: 2022-02-25 | End: 2022-02-25 | Stop reason: HOSPADM

## 2022-02-25 RX ORDER — KETAMINE HCL IN NACL, ISO-OSM 100MG/10ML
SYRINGE (ML) INJECTION AS NEEDED
Status: DISCONTINUED | OUTPATIENT
Start: 2022-02-25 | End: 2022-02-25 | Stop reason: SURG

## 2022-02-25 RX ORDER — MIDAZOLAM HYDROCHLORIDE 1 MG/ML
INJECTION INTRAMUSCULAR; INTRAVENOUS
Status: COMPLETED | OUTPATIENT
Start: 2022-02-25 | End: 2022-02-25

## 2022-02-25 RX ORDER — HYDROCODONE BITARTRATE AND ACETAMINOPHEN 7.5; 325 MG/1; MG/1
1 TABLET ORAL EVERY 8 HOURS PRN
Qty: 90 TABLET | Refills: 0 | Status: SHIPPED | OUTPATIENT
Start: 2022-02-25 | End: 2022-04-04 | Stop reason: SDUPTHER

## 2022-02-25 RX ORDER — LIDOCAINE HYDROCHLORIDE AND EPINEPHRINE 10; 10 MG/ML; UG/ML
INJECTION, SOLUTION INFILTRATION; PERINEURAL
Status: COMPLETED | OUTPATIENT
Start: 2022-02-25 | End: 2022-02-25

## 2022-02-25 RX ORDER — PROPOFOL 10 MG/ML
VIAL (ML) INTRAVENOUS AS NEEDED
Status: DISCONTINUED | OUTPATIENT
Start: 2022-02-25 | End: 2022-02-25 | Stop reason: SURG

## 2022-02-25 RX ORDER — FENTANYL CITRATE 50 UG/ML
INJECTION, SOLUTION INTRAMUSCULAR; INTRAVENOUS
Status: COMPLETED | OUTPATIENT
Start: 2022-02-25 | End: 2022-02-25

## 2022-02-25 RX ORDER — PHENYLEPHRINE HCL IN 0.9% NACL 1 MG/10 ML
SYRINGE (ML) INTRAVENOUS AS NEEDED
Status: DISCONTINUED | OUTPATIENT
Start: 2022-02-25 | End: 2022-02-25 | Stop reason: SURG

## 2022-02-25 RX ORDER — MORPHINE SULFATE 4 MG/ML
2 INJECTION, SOLUTION INTRAMUSCULAR; INTRAVENOUS EVERY 4 HOURS PRN
Status: DISCONTINUED | OUTPATIENT
Start: 2022-02-25 | End: 2022-02-26 | Stop reason: HOSPADM

## 2022-02-25 RX ADMIN — MIDAZOLAM 1 MG: 1 INJECTION INTRAMUSCULAR; INTRAVENOUS at 18:00

## 2022-02-25 RX ADMIN — SODIUM CHLORIDE: 0.9 INJECTION, SOLUTION INTRAVENOUS at 17:25

## 2022-02-25 RX ADMIN — PROPOFOL 25 MG: 10 INJECTION, EMULSION INTRAVENOUS at 18:34

## 2022-02-25 RX ADMIN — LIDOCAINE HYDROCHLORIDE,EPINEPHRINE BITARTRATE 10 ML: 10; .01 INJECTION, SOLUTION INFILTRATION; PERINEURAL at 08:09

## 2022-02-25 RX ADMIN — FENTANYL CITRATE 50 MCG: 50 INJECTION, SOLUTION INTRAMUSCULAR; INTRAVENOUS at 08:03

## 2022-02-25 RX ADMIN — PROPOFOL 25 MG: 10 INJECTION, EMULSION INTRAVENOUS at 18:43

## 2022-02-25 RX ADMIN — MIDAZOLAM 1 MG: 1 INJECTION INTRAMUSCULAR; INTRAVENOUS at 17:50

## 2022-02-25 RX ADMIN — MIDAZOLAM 1 MG: 1 INJECTION INTRAMUSCULAR; INTRAVENOUS at 17:35

## 2022-02-25 RX ADMIN — MIDAZOLAM 1 MG: 1 INJECTION INTRAMUSCULAR; INTRAVENOUS at 17:28

## 2022-02-25 RX ADMIN — GABAPENTIN 100 MG: 100 CAPSULE ORAL at 20:54

## 2022-02-25 RX ADMIN — MIDAZOLAM 0.5 MG: 1 INJECTION INTRAMUSCULAR; INTRAVENOUS at 08:07

## 2022-02-25 RX ADMIN — Medication 15 MG: at 18:03

## 2022-02-25 RX ADMIN — PROPOFOL 25 MG: 10 INJECTION, EMULSION INTRAVENOUS at 18:37

## 2022-02-25 RX ADMIN — MIDAZOLAM 1 MG: 1 INJECTION INTRAMUSCULAR; INTRAVENOUS at 17:45

## 2022-02-25 RX ADMIN — CEFAZOLIN SODIUM 2 G: 1 INJECTION, POWDER, FOR SOLUTION INTRAMUSCULAR; INTRAVENOUS at 17:30

## 2022-02-25 RX ADMIN — Medication 15 MG: at 18:16

## 2022-02-25 RX ADMIN — ATORVASTATIN CALCIUM 20 MG: 20 TABLET, FILM COATED ORAL at 20:54

## 2022-02-25 RX ADMIN — HYDROCODONE BITARTRATE AND ACETAMINOPHEN 2 TABLET: 7.5; 325 TABLET ORAL at 01:34

## 2022-02-25 RX ADMIN — FENTANYL CITRATE 50 MCG: 50 INJECTION, SOLUTION INTRAMUSCULAR; INTRAVENOUS at 08:07

## 2022-02-25 RX ADMIN — ONDANSETRON 4 MG: 2 INJECTION INTRAMUSCULAR; INTRAVENOUS at 07:58

## 2022-02-25 RX ADMIN — PROPOFOL 25 MG: 10 INJECTION, EMULSION INTRAVENOUS at 18:46

## 2022-02-25 RX ADMIN — Medication 50 MCG: at 17:33

## 2022-02-25 RX ADMIN — MIDAZOLAM 1 MG: 1 INJECTION INTRAMUSCULAR; INTRAVENOUS at 17:26

## 2022-02-25 RX ADMIN — FENTANYL CITRATE 50 MCG: 50 INJECTION INTRAMUSCULAR; INTRAVENOUS at 17:24

## 2022-02-25 RX ADMIN — SENNOSIDES AND DOCUSATE SODIUM 2 TABLET: 50; 8.6 TABLET ORAL at 20:54

## 2022-02-25 RX ADMIN — PROMETHAZINE HYDROCHLORIDE 25 MG: 25 TABLET ORAL at 20:58

## 2022-02-25 RX ADMIN — MORPHINE SULFATE 2 MG: 4 INJECTION INTRAVENOUS at 20:57

## 2022-02-25 RX ADMIN — MIDAZOLAM 0.5 MG: 1 INJECTION INTRAMUSCULAR; INTRAVENOUS at 08:04

## 2022-02-25 RX ADMIN — MIDAZOLAM 1 MG: 1 INJECTION INTRAMUSCULAR; INTRAVENOUS at 17:24

## 2022-02-25 RX ADMIN — FENTANYL CITRATE 50 MCG: 50 INJECTION INTRAMUSCULAR; INTRAVENOUS at 17:28

## 2022-02-25 RX ADMIN — VANCOMYCIN HYDROCHLORIDE 1250 MG: 1.25 INJECTION, POWDER, LYOPHILIZED, FOR SOLUTION INTRAVENOUS at 20:54

## 2022-02-26 VITALS
HEIGHT: 64 IN | OXYGEN SATURATION: 93 % | DIASTOLIC BLOOD PRESSURE: 55 MMHG | WEIGHT: 174.9 LBS | BODY MASS INDEX: 29.86 KG/M2 | TEMPERATURE: 97.6 F | SYSTOLIC BLOOD PRESSURE: 115 MMHG | RESPIRATION RATE: 11 BRPM | HEART RATE: 61 BPM

## 2022-02-26 PROBLEM — I95.1 ORTHOSTATIC HYPOTENSION: Status: ACTIVE | Noted: 2022-02-26

## 2022-02-26 LAB
GLUCOSE BLDC GLUCOMTR-MCNC: 156 MG/DL (ref 70–105)
GLUCOSE BLDC GLUCOMTR-MCNC: 158 MG/DL (ref 70–105)
GLUCOSE BLDC GLUCOMTR-MCNC: 166 MG/DL (ref 70–105)
GLUCOSE BLDC GLUCOMTR-MCNC: 201 MG/DL (ref 70–105)

## 2022-02-26 PROCEDURE — 99232 SBSQ HOSP IP/OBS MODERATE 35: CPT | Performed by: INTERNAL MEDICINE

## 2022-02-26 PROCEDURE — 0 MORPHINE SULFATE 4 MG/ML SOLUTION: Performed by: INTERNAL MEDICINE

## 2022-02-26 PROCEDURE — 99239 HOSP IP/OBS DSCHRG MGMT >30: CPT | Performed by: INTERNAL MEDICINE

## 2022-02-26 PROCEDURE — 97164 PT RE-EVAL EST PLAN CARE: CPT

## 2022-02-26 PROCEDURE — 82962 GLUCOSE BLOOD TEST: CPT

## 2022-02-26 RX ORDER — BLOOD SUGAR DIAGNOSTIC
1 STRIP MISCELLANEOUS DAILY
Qty: 50 EACH | Refills: 2 | Status: SHIPPED | OUTPATIENT
Start: 2022-02-26

## 2022-02-26 RX ORDER — POTASSIUM CHLORIDE 1.5 G/1.77G
20 POWDER, FOR SOLUTION ORAL DAILY
Qty: 30 PACKET | Refills: 0
Start: 2022-02-26 | End: 2022-03-22

## 2022-02-26 RX ORDER — MIDODRINE HYDROCHLORIDE 5 MG/1
10 TABLET ORAL
Status: DISCONTINUED | OUTPATIENT
Start: 2022-02-26 | End: 2022-02-26

## 2022-02-26 RX ORDER — AMOXICILLIN 250 MG
2 CAPSULE ORAL 2 TIMES DAILY PRN
Qty: 60 TABLET | Refills: 0 | Status: SHIPPED | OUTPATIENT
Start: 2022-02-26

## 2022-02-26 RX ORDER — MIDODRINE HYDROCHLORIDE 5 MG/1
5 TABLET ORAL
Status: DISCONTINUED | OUTPATIENT
Start: 2022-02-26 | End: 2022-02-26 | Stop reason: HOSPADM

## 2022-02-26 RX ORDER — ATORVASTATIN CALCIUM 20 MG/1
20 TABLET, FILM COATED ORAL NIGHTLY
Qty: 30 TABLET | Refills: 0 | Status: SHIPPED | OUTPATIENT
Start: 2022-02-26 | End: 2022-09-21 | Stop reason: SDUPTHER

## 2022-02-26 RX ORDER — MIDODRINE HYDROCHLORIDE 5 MG/1
5 TABLET ORAL
Qty: 30 TABLET | Refills: 0 | Status: SHIPPED | OUTPATIENT
Start: 2022-02-26 | End: 2022-02-26 | Stop reason: HOSPADM

## 2022-02-26 RX ORDER — POTASSIUM CHLORIDE 750 MG/1
10 TABLET, FILM COATED, EXTENDED RELEASE ORAL 2 TIMES DAILY
Qty: 15 TABLET | Refills: 0 | Status: SHIPPED | OUTPATIENT
Start: 2022-02-26

## 2022-02-26 RX ORDER — LANCETS 33 GAUGE
1 EACH MISCELLANEOUS DAILY
Qty: 100 EACH | Refills: 2 | Status: SHIPPED | OUTPATIENT
Start: 2022-02-26

## 2022-02-26 RX ORDER — LISINOPRIL 2.5 MG/1
2.5 TABLET ORAL DAILY
Qty: 30 TABLET | Refills: 0 | Status: SHIPPED | OUTPATIENT
Start: 2022-02-26 | End: 2023-01-09

## 2022-02-26 RX ORDER — NEBIVOLOL 5 MG/1
5 TABLET ORAL DAILY
Qty: 30 TABLET | Refills: 3 | Status: SHIPPED | OUTPATIENT
Start: 2022-02-27 | End: 2022-09-21

## 2022-02-26 RX ORDER — POLYETHYLENE GLYCOL 3350 17 G/17G
17 POWDER, FOR SOLUTION ORAL DAILY PRN
Qty: 510 G | Refills: 0 | Status: SHIPPED | OUTPATIENT
Start: 2022-02-26

## 2022-02-26 RX ADMIN — HYDROCODONE BITARTRATE AND ACETAMINOPHEN 1 TABLET: 7.5; 325 TABLET ORAL at 08:57

## 2022-02-26 RX ADMIN — SUCRALFATE 1 G: 1 TABLET ORAL at 08:57

## 2022-02-26 RX ADMIN — Medication 10 ML: at 08:57

## 2022-02-26 RX ADMIN — LEVOTHYROXINE SODIUM 250 MCG: 0.12 TABLET ORAL at 05:23

## 2022-02-26 RX ADMIN — GABAPENTIN 100 MG: 100 CAPSULE ORAL at 05:24

## 2022-02-26 RX ADMIN — SENNOSIDES AND DOCUSATE SODIUM 2 TABLET: 50; 8.6 TABLET ORAL at 08:57

## 2022-02-26 RX ADMIN — HYDROCODONE BITARTRATE AND ACETAMINOPHEN 1 TABLET: 7.5; 325 TABLET ORAL at 01:19

## 2022-02-26 RX ADMIN — MIDODRINE HYDROCHLORIDE 5 MG: 5 TABLET ORAL at 16:18

## 2022-02-26 RX ADMIN — FERROUS SULFATE TAB EC 324 MG (65 MG FE EQUIVALENT) 324 MG: 324 (65 FE) TABLET DELAYED RESPONSE at 08:57

## 2022-02-26 RX ADMIN — SUCRALFATE 1 G: 1 TABLET ORAL at 12:10

## 2022-02-26 RX ADMIN — MORPHINE SULFATE 2 MG: 4 INJECTION INTRAVENOUS at 02:48

## 2022-02-26 RX ADMIN — CALCIUM CARBONATE-VITAMIN D TAB 500 MG-200 UNIT 1 TABLET: 500-200 TAB at 08:57

## 2022-02-26 RX ADMIN — ESCITALOPRAM OXALATE 10 MG: 10 TABLET ORAL at 08:57

## 2022-02-26 RX ADMIN — POTASSIUM CHLORIDE 40 MEQ: 1.5 POWDER, FOR SOLUTION ORAL at 08:57

## 2022-02-26 RX ADMIN — LISINOPRIL 5 MG: 5 TABLET ORAL at 08:56

## 2022-02-26 RX ADMIN — AMLODIPINE BESYLATE 10 MG: 5 TABLET ORAL at 08:57

## 2022-02-26 RX ADMIN — ANASTROZOLE 1 MG: 1 TABLET ORAL at 08:57

## 2022-02-26 NOTE — ANESTHESIA POSTPROCEDURE EVALUATION
Patient: Shantel Maharaj    Procedure Summary     Date: 02/25/22 Room / Location: [facility] CATH LAB 3 / [facility] CATH INVASIVE LOCATION    Anesthesia Start: 1720 Anesthesia Stop: 1919    Procedure: Pacemaker DC [facility] (N/A ) Diagnosis:       Sick sinus syndrome (HCC)      (Sick sinus syndrome)    Providers: Ramsey Dinero MD Provider: Silas Murrell MD    Anesthesia Type: MAC ASA Status: 3          Anesthesia Type: MAC    Vitals  No vitals data found for the desired time range.          Post Anesthesia Care and Evaluation    Patient location during evaluation: PACU  Patient participation: complete - patient cannot participate  Level of consciousness: responsive to light touch, responsive to physical stimuli, responsive to verbal stimuli and sleepy but conscious  Pain score: 0  Pain management: adequate  Airway patency: patent  Anesthetic complications: No anesthetic complications  PONV Status: controlled  Cardiovascular status: acceptable and hemodynamically stable  Respiratory status: acceptable and face mask  Hydration status: acceptable    Comments: Satisfactory progress.Patient seen and examined postoperatively; vital signs stable; SpO2 greater than or equal to 90%; cardiopulmonary status stable; nausea/vomiting adequately controlled; pain adequately controlled; no apparent anesthesia complications; patient discharged from anesthesia care when discharge criteria were met

## 2022-02-27 ENCOUNTER — READMISSION MANAGEMENT (OUTPATIENT)
Dept: CALL CENTER | Facility: HOSPITAL | Age: 62
End: 2022-02-27

## 2022-02-27 NOTE — OUTREACH NOTE
Prep Survey      Responses   [facility] patient discharged from? [facility]   Is LACE score < 7 ? No   Emergency Room discharge w/ pulse ox? No   Eligibility Readm Mgmt   Discharge diagnosis Sick sinus syndromeOrthostatic hypotension    Does the patient have one of the following disease processes/diagnoses(primary or secondary)? Other   Does the patient have Home health ordered? Yes   What is the Home health agency?  [facility] ,   Is there a DME ordered? No   Prep survey completed? Yes          Debbie Sheriff RN

## 2022-03-02 ENCOUNTER — READMISSION MANAGEMENT (OUTPATIENT)
Dept: CALL CENTER | Facility: HOSPITAL | Age: 62
End: 2022-03-02

## 2022-03-02 NOTE — OUTREACH NOTE
Medical Week 1 Survey      Responses   [facility] patient discharged from? [facility]   Does the patient have one of the following disease processes/diagnoses(primary or secondary)? Other   Week 1 attempt successful? Yes   Call start time 1510   Call end time 1552   Discharge diagnosis Sick sinus syndromeOrthostatic hypotension    Meds reviewed with patient/caregiver? Yes   Is the patient having any side effects they believe may be caused by any medication additions or changes? No   Does the patient have all medications ordered at discharge? Yes   Is the patient taking all medications as directed (includes completed medication regime)? Yes   Comments Wound check for PPM will be 2 weeks out per office on March 10th @ 130 with Lorena. The PCP appt is 03/03/22 @ 120 with Colleen Manriquez in the [facility].   What is the Home health agency?  [facility] ,   Has home health visited the patient within 72 hours of discharge? Yes   Psychosocial issues? No   Did the patient receive a copy of their discharge instructions? Yes   Nursing interventions Reviewed instructions with patient   What is the patient's perception of their health status since discharge? Improving   Is the patient/caregiver able to teach back signs and symptoms related to disease process for when to call PCP? Yes   Is the patient/caregiver able to teach back signs and symptoms related to disease process for when to call 911? Yes   Is the patient/caregiver able to teach back the hierarchy of who to call/visit for symptoms/problems? PCP, Specialist, Home health nurse, Urgent Care, ED, 911 Yes   If the patient is a current smoker, are they able to teach back resources for cessation? Not a smoker   Additional teach back comments She says the wound looks good, wearing the sling and not putting it over her head. She says she is not having any issues.   Week 1 call completed? Yes   Wrap up additional comments Improving.          Yany Morales RN

## 2022-03-03 LAB — QT INTERVAL: 395 MS

## 2022-03-10 ENCOUNTER — CLINICAL SUPPORT NO REQUIREMENTS (OUTPATIENT)
Dept: CARDIOLOGY | Facility: CLINIC | Age: 62
End: 2022-03-10

## 2022-03-10 DIAGNOSIS — I49.5 SICK SINUS SYNDROME: Primary | ICD-10-CM

## 2022-03-10 DIAGNOSIS — Z95.0 PACEMAKER: ICD-10-CM

## 2022-03-10 PROCEDURE — 93280 PM DEVICE PROGR EVAL DUAL: CPT | Performed by: INTERNAL MEDICINE

## 2022-03-10 NOTE — PROGRESS NOTES
Patient was seen today for incision check due to pacemaker placement. Removed dressing and steri strips with no issues. Incision well approximated, no swelling, redness or drainage present.. Patient teaching related to right arm precautions, movement and weight limits. Demonstrated to patient how to set up remote monitor. Follow up appointment made for patient.

## 2022-03-11 ENCOUNTER — READMISSION MANAGEMENT (OUTPATIENT)
Dept: CALL CENTER | Facility: HOSPITAL | Age: 62
End: 2022-03-11

## 2022-03-11 NOTE — OUTREACH NOTE
Medical Week 2 Survey    Flowsheet Row Responses   [facility] patient discharged from? [facility]   Does the patient have one of the following disease processes/diagnoses(primary or secondary)? Other   Week 2 attempt successful? Yes   Call start time 1154   Discharge diagnosis Sick sinus syndromeOrthostatic hypotension    Call end time 1158   Is patient permission given to speak with other caregiver? Yes   List who call center can speak with dtrs   Meds reviewed with patient/caregiver? Yes   Is the patient having any side effects they believe may be caused by any medication additions or changes? No   Is the patient taking all medications as directed (includes completed medication regime)? Yes   Does the patient have a primary care provider?  Yes   Does the patient have an appointment with their PCP within 7 days of discharge? Yes   Has the patient kept scheduled appointments due by today? Yes   Psychosocial issues? No   Comments Pt denies dizziness/CP. Feels easily fatigued & SOA, feels low of air when lying down. She has baseline arm pain bilaterally. Wound is healing well.    What is the patient's perception of their health status since discharge? Improving   Is the patient/caregiver able to teach back signs and symptoms related to disease process for when to call PCP? Yes   Week 2 Call Completed? Yes          NORBERTO FREEMAN - Registered Nurse

## 2022-03-14 ENCOUNTER — TELEPHONE (OUTPATIENT)
Dept: DIABETES SERVICES | Facility: HOSPITAL | Age: 62
End: 2022-03-14

## 2022-03-15 ENCOUNTER — TELEPHONE (OUTPATIENT)
Dept: DIABETES SERVICES | Facility: HOSPITAL | Age: 62
End: 2022-03-15

## 2022-03-15 NOTE — TELEPHONE ENCOUNTER
Pt states she has been checking bs and getting bs readings in the 300s in the evening. Pt taking her glipizide pill in the pm. She states she was feeling light-headed and dizzy around 7 pm this past Saturday and Monday evening. Discussed these could be from low bs and encouraged pt to check bs when not feeling well. Discussed appropriate tx of low bs. Pt states she did not get rxs for One Touch Verio test strips and lancets. Reviewed chart. Pt used [facility] pharmacy. She did not receive these rxs at discharge. Pt wanting rxs to be sent to [facility] in [place]. Contacted [facility] pharmacy and spoke with Werner. He states he will transfer test strip and lancet rxs to requested pharmacy. Contacted pt back and notified her of this info. Pt scheduled for follow up with PCP tomorrow. Pt with no additional questions. Encouraged pt to contact educator if has difficulty with filling rxs for bs monitoring supplies.

## 2022-03-21 NOTE — PROGRESS NOTES
Chief Complaint  Dizziness    Subjective          Shantel Maharaj presents to [facility] NEUROLOGY for TIA  History of Present Illness   Patient is here for a hospital f/u she was seen at [facility] for dizziness. Possible TIA. Was evaluated and found to have bradycardia as probable cause. No stroke.     Pacemake placed, but continues to have dizziness.  The dizziness started just days before went into hospital,  Has dizzy spells.  Brought on by dizziness, with exertion such as up washing dishes, will sit down, feels exhausted, will go sound to sleep, sleeps about an hour then feels ok.    She has been to cardiologist office to check the pacemaker.     Pt denies trouble with sleeping. Or day time sleepiness.   Pt had NPSG several years ago, pt told no sleep apnea.   Pt wakes up several times per night and feels like SOB.                 ======[facility] ED VISIT 2/21/22=====  Shantel Maharaj is a 61-year-old female patient who presented with near syncope and dizziness.  She was found to be bradycardic heart rate in the 40s.  Bystolic was discontinued and even then her heart rate did not improve.  Patient was still in the 40s and feeling dizzy and lightheaded when trying to walk.  Therefore she received a pacemaker on 2/25/2022 right-sided due to presence of lymphedema on the left.  I  Patient had a device check and is doing well  Patient will be followed in the office by the primary cardiologist  I discussed the patients findings and my recommendations with patient and agrees with the outlined plan.     Justin Mon MD  02/26/22  11:31 EST  ==================================================================     ASSESSMENT/PLAN:  The patient is a 61 yr old lady with multiple medical problems including DM, HTN, HLD who was having dizziness, balance disturbance.  Some of clinical features were suggestive of stroke in posterior circulation.  However stroke is ruled out since MRI of Brain showed no acute findings.  She  may have syncopal episodes due to cardiac etiology.   Rec:  Agree with evaluation by cardiology service.  Since triglycerides were elevated, will add Lipitor.  Patient is signed off.  Please call for further assistance.  DM, HTN, LBP, OA, HLD  As per hospitalMD mansoor.       **Please refer to previous notes for further details and recommendations.      I discussed the patients findings and my recommendations with patient and nursing staff     Brii Villanueva MD  02/23/22  13:40 EST      MRI Brain With & Without Contrast     Result Date: 2/22/2022  Impression:  1. Age-appropriate atrophy with a few punctate areas of increased T2 signal in the periventricular region likely representing chronic microvascular disease. 2. No evidence of intracranial metastatic disease. 3. No acute intracranial findings. 4. Small amount of mucosal thickening or fluid in the right mastoid air cells.  Electronically Signed By-Silas Castillo MD On:2/22/2022 2:14 PM This report was finalized on [id number] by  Silas Castillo MD.     XR Chest 1 View     Result Date: 2/25/2022  Impression: New dual-lead pacemaker from a right-sided approach. No pneumothorax. The left costophrenic angle is obscured likely from atelectasis and/or small effusion. The lungs otherwise are radiographically clear.  Electronically Signed By-Tez Rooney DO On:2/25/2022 8:09 PM This report was finalized on [id number] by  Tez Rooney DO.     XR Chest 1 View     Result Date: 2/21/2022  Impression: 1.Cardiomegaly  Electronically Signed By-Sea Coffman MD On:2/21/2022 2:04 PM This report was finalized on [id number] by  Sea Coffman MD.     IR Removal Tunnel CV With Port Different Site     Result Date: 2/25/2022  Impression: Uncomplicated removal, chronically indwelling right chest wall subclavian [id number]-h-Acmx, as described above.  Electronically Signed By-Bob Azul MD On:2/25/2022 12:17 PM This report was finalized on [id number] by  Bob Azul  MD.     CT Head Without Contrast Stroke Protocol     Result Date: 2/21/2022  Impression: IMPRESSION : Negative  Electronically Signed By-Anand Gonzalez On:2/21/2022 3:51 PM This report was finalized on [id number] by  Anand Gonzalez, .               Results for orders placed during the hospital encounter of 02/21/22     Adult Transthoracic Echo Complete W/ Cont if Necessary Per Protocol     Interpretation Summary  · Estimated left ventricular EF was in agreement with the calculated left ventricular EF. Left ventricular ejection fraction appears to be 61 - 65%. Left ventricular systolic function is normal.  · Left ventricular wall thickness is consistent with borderline concentric hypertrophy.  · Left ventricular diastolic function is consistent with (grade I) impaired relaxation.  · Saline test results are negative.  · Estimated right ventricular systolic pressure from tricuspid regurgitation is mildly elevated (35-45 mmHg).                Current Outpatient Medications:   •  anastrozole (ARIMIDEX) 1 MG tablet, Take 1 mg by mouth Daily., Disp: , Rfl:   •  atorvastatin (LIPITOR) 20 MG tablet, Take 1 tablet by mouth Every Night., Disp: 30 tablet, Rfl: 0  •  Calcium 600/Vitamin D 600-400 MG-UNIT chewable tablet, Chew 1 tablet 2 (Two) Times a Day., Disp: , Rfl:   •  cyclobenzaprine (FLEXERIL) 10 MG tablet, Take 1 tablet by mouth 3 (Three) Times a Day As Needed for Muscle Spasms., Disp: 90 tablet, Rfl: 2  •  dexlansoprazole (DEXILANT) 60 MG capsule, Take 60 mg by mouth Every Night., Disp: , Rfl:   •  escitalopram (LEXAPRO) 10 MG tablet, Take 10 mg by mouth Daily., Disp: , Rfl:   •  fenofibrate (TRICOR) 145 MG tablet, Take 145 mg by mouth As Needed., Disp: , Rfl:   •  ferrous sulfate 325 (65 FE) MG tablet, Take 325 mg by mouth As Needed., Disp: , Rfl:   •  fluticasone (FLONASE) 50 MCG/ACT nasal spray, 1 spray by Each Nare route As Needed. As needed, Disp: , Rfl:   •  gabapentin (NEURONTIN) 100 MG capsule, Take 100 mg  by mouth 3 (Three) Times a Day., Disp: , Rfl:   •  glipizide (GLUCOTROL XL) 2.5 MG 24 hr tablet, Take 2.5 mg by mouth Every Night., Disp: , Rfl:   •  HYDROcodone-acetaminophen (NORCO) 7.5-325 MG per tablet, Take 1 tablet by mouth Every 8 (Eight) Hours As Needed for Moderate Pain ., Disp: 90 tablet, Rfl: 0  •  Lancets (OneTouch Delica Plus [id number]) misc, 1 each Daily. Dx: E11.90, Disp: 100 each, Rfl: 2  •  levothyroxine (SYNTHROID, LEVOTHROID) 200 MCG tablet, Take 200 mcg by mouth Every Morning. Take with 50mcg for total daily dose 250mcg, Disp: , Rfl:   •  levothyroxine (SYNTHROID, LEVOTHROID) 50 MCG tablet, Take 50 mcg by mouth Every Morning. Take with 200mcg for total daily dose 250mcg, Disp: , Rfl:   •  lisinopril (PRINIVIL,ZESTRIL) 2.5 MG tablet, Take 1 tablet by mouth Daily., Disp: 30 tablet, Rfl: 0  •  nebivolol (BYSTOLIC) 5 MG tablet, Take 1 tablet by mouth Daily., Disp: 30 tablet, Rfl: 3  •  OneTouch Verio test strip, 1 each by Other route Daily. Dx: E11.90 Use as instructed, Disp: 50 each, Rfl: 2  •  oxybutynin XL (DITROPAN-XL) 5 MG 24 hr tablet, Take 5 mg by mouth Daily., Disp: , Rfl:   •  polyethylene glycol (MIRALAX) 17 GM/SCOOP powder, Take 17 g by mouth Daily As Needed (Use if senna-docusate is ineffective)., Disp: 510 g, Rfl: 0  •  potassium chloride 10 MEQ CR tablet, Take 1 tablet by mouth 2 (Two) Times a Day., Disp: 15 tablet, Rfl: 0  •  promethazine (PHENERGAN) 25 MG tablet, Take 25 mg by mouth As Needed., Disp: , Rfl:   •  sennosides-docusate (PERICOLACE) 8.6-50 MG per tablet, Take 2 tablets by mouth 2 (Two) Times a Day As Needed for Constipation., Disp: 60 tablet, Rfl: 0  •  sucralfate (CARAFATE) 1 g tablet, Take 1 g by mouth Daily., Disp: , Rfl:   •  amLODIPine (NORVASC) 10 MG tablet, Take 10 mg by mouth Daily., Disp: , Rfl:     Review of Systems   Constitutional: Positive for fatigue.   Gastrointestinal: Positive for constipation.   Neurological: Positive for dizziness, weakness and  "light-headedness.   All other systems reviewed and are negative.         Objective:    Vital Signs:   /75   Pulse 76   Temp 97.3 °F (36.3 °C) (Temporal)   Ht 162.6 cm (64\")   Wt 72.1 kg (159 lb)   BMI 27.29 kg/m²     Physical Exam  Vitals reviewed.   HENT:      Head: Normocephalic.      Nose: Nose normal.   Eyes:      Pupils: Pupils are equal, round, and reactive to light.   Cardiovascular:      Rate and Rhythm: Normal rate.      Pulses: Normal pulses.   Pulmonary:      Effort: Pulmonary effort is normal. No respiratory distress.   Skin:     General: Skin is warm.   Neurological:      General: No focal deficit present.      Mental Status: She is alert and oriented to person, place, and time.   Psychiatric:         Mood and Affect: Mood normal.        Result Review :                Neurologic Exam     Mental Status   Oriented to person, place, and time.     Cranial Nerves     CN III, IV, VI   Pupils are equal, round, and reactive to light.        Assessment and Plan    Diagnoses and all orders for this visit:    1. Dizziness (Primary)    2. CLAU (obstructive sleep apnea)  -     Home Sleep Study; Future       Pt. may have clau with waking up SOB and snoring.   Spells of dizziness, not likely tia or seizures  Pt to fu with cardiologist.       Follow Up   Return in about 3 months (around 6/22/2022).  Patient was given instructions and counseling regarding her condition or for health maintenance advice. Please see specific information pulled into the AVS if appropriate.     This document has been electronically signed by Joseph Seipel, MD on March 22, 2022 12:07 EDT      "

## 2022-03-22 ENCOUNTER — OFFICE VISIT (OUTPATIENT)
Dept: NEUROLOGY | Facility: CLINIC | Age: 62
End: 2022-03-22

## 2022-03-22 VITALS
TEMPERATURE: 97.3 F | DIASTOLIC BLOOD PRESSURE: 75 MMHG | HEIGHT: 64 IN | SYSTOLIC BLOOD PRESSURE: 120 MMHG | HEART RATE: 76 BPM | BODY MASS INDEX: 27.14 KG/M2 | WEIGHT: 159 LBS

## 2022-03-22 DIAGNOSIS — G47.33 OSA (OBSTRUCTIVE SLEEP APNEA): ICD-10-CM

## 2022-03-22 DIAGNOSIS — R42 DIZZINESS: Primary | ICD-10-CM

## 2022-03-22 PROBLEM — M81.0 OSTEOPOROSIS: Status: ACTIVE | Noted: 2021-06-25

## 2022-03-22 PROBLEM — C50.911 INVASIVE DUCTAL CARCINOMA OF RIGHT BREAST (HCC): Status: ACTIVE | Noted: 2021-02-04

## 2022-03-22 PROBLEM — E03.9 HYPOTHYROIDISM: Status: ACTIVE | Noted: 2019-07-02

## 2022-03-22 PROCEDURE — 99214 OFFICE O/P EST MOD 30 MIN: CPT | Performed by: PSYCHIATRY & NEUROLOGY

## 2022-03-22 RX ORDER — AMLODIPINE BESYLATE 10 MG/1
10 TABLET ORAL DAILY
COMMUNITY
Start: 2022-03-10 | End: 2022-04-04

## 2022-03-23 ENCOUNTER — READMISSION MANAGEMENT (OUTPATIENT)
Dept: CALL CENTER | Facility: HOSPITAL | Age: 62
End: 2022-03-23

## 2022-03-23 NOTE — OUTREACH NOTE
Medical Week 3 Survey    Flowsheet Row Responses   [facility] facility patient discharged from? [facility]   Does the patient have one of the following disease processes/diagnoses(primary or secondary)? Other   Week 3 attempt successful? No   Unsuccessful attempts Attempt 1          GISELA ELMORE - Registered Nurse

## 2022-04-01 ENCOUNTER — OFFICE VISIT (OUTPATIENT)
Dept: CARDIOLOGY | Facility: CLINIC | Age: 62
End: 2022-04-01

## 2022-04-01 VITALS
HEIGHT: 64 IN | DIASTOLIC BLOOD PRESSURE: 87 MMHG | WEIGHT: 161 LBS | BODY MASS INDEX: 27.49 KG/M2 | OXYGEN SATURATION: 98 % | SYSTOLIC BLOOD PRESSURE: 142 MMHG | HEART RATE: 71 BPM

## 2022-04-01 DIAGNOSIS — I10 PRIMARY HYPERTENSION: Chronic | ICD-10-CM

## 2022-04-01 DIAGNOSIS — Z95.0 PACEMAKER: ICD-10-CM

## 2022-04-01 DIAGNOSIS — I49.5 SICK SINUS SYNDROME: Primary | ICD-10-CM

## 2022-04-01 PROCEDURE — 99024 POSTOP FOLLOW-UP VISIT: CPT | Performed by: INTERNAL MEDICINE

## 2022-04-01 RX ORDER — FLUTICASONE PROPIONATE 50 MCG
1 SPRAY, SUSPENSION (ML) NASAL DAILY
COMMUNITY
Start: 2022-02-18 | End: 2022-09-21

## 2022-04-01 NOTE — PROGRESS NOTES
HP      Name: Shantel Maharaj ADMIT: (Not on file)   : 1960  PCP: Gris Dominguez MD    MRN: [id number] LOS: 0 days   AGE/SEX: 62 y.o. female  ROOM: Room/bed info not found     Chief Complaint   Patient presents with   • Follow-up     S/P PM (new)       Subjective        History of present illness  Shantel Maharaj is a 62-year-old female patient's who has hypertension, dyslipidemia, diabetes, history of breast cancer with bilateral mastectomy, chronically swollen left arm due to lymphedema, sick sinus syndrome status post dual-chamber pacemaker implantation on 2022 right-sided, here today for follow-up.  Patient is doing well denies having any chest pain or shortness of breath, she still feels tired and is scheduled to have a sleep study done.  Prior to her pacemaker implantation she was consistently bradycardic heart rate in the 40s.    Past Medical History:   Diagnosis Date   • Arm pain    • Breast cancer (HCC)    • Cancer (HCC)     lt breast -- reocc rt breast 2020   • COVID-19    • Diabetes (HCC)    • Drug therapy    • High cholesterol    • Hx of radiation therapy    • Hypertension    • Low back pain    • Osteoarthritis    • Osteopenia    • Thyroid disease      Past Surgical History:   Procedure Laterality Date   • APPENDECTOMY     • BREAST LUMPECTOMY      2021-- [facility]-- cancer-- reocc   • BREAST SURGERY      removal of left breast    • CARDIAC ELECTROPHYSIOLOGY PROCEDURE N/A 2022    Procedure: Pacemaker DC new-Kartik;  Surgeon: Ramsey Dinero MD;  Location: [facility] INVASIVE LOCATION;  Service: Cardiology;  Laterality: N/A;   •  SECTION      x2   • GALLBLADDER SURGERY     • KNEE SURGERY      left knee - petella broken    • OTHER SURGICAL HISTORY      rt arm surgery with shlomo  placement rt arm   • SHOULDER SURGERY      broken-lt- in 12 places   • WRIST SURGERY      rt - broken     Family History   Problem Relation Age of Onset   • Cancer Mother    • Breast cancer Mother     • Heart disease Father    • Breast cancer Maternal Aunt    • Breast cancer Maternal Aunt    • Breast cancer Maternal Aunt      Social History     Tobacco Use   • Smoking status: Former Smoker   • Smokeless tobacco: Never Used   • Tobacco comment: quit  smoking in 2008   Vaping Use   • Vaping Use: Never used   Substance Use Topics   • Alcohol use: Not Currently   • Drug use: Defer     (Not in a hospital admission)    Allergies:  Valsartan    Review of systems    Constitutional: Negative.    Respiratory and cardiovascular: As detailed in HPI section.  Gastrointestinal: Negative for constipation, nausea and vomiting negative for abdominal distention, abdominal pain and diarrhea.   Genitourinary: Negative for difficulty urinating and flank pain.   Musculoskeletal: Negative for arthralgias, joint swelling and myalgias.   Skin: Negative for color change, rash and wound.   Neurological: Negative for dizziness, syncope, weakness and headaches.   Hematological: Negative for adenopathy.   Psychiatric/Behavioral: Negative for confusion.   All other systems reviewed and are negative.    Physical Exam  VITALS REVIEWED    General:      well developed, in no acute distress.    Head:      normocephalic and atraumatic.    Eyes:      PERRL/EOM intact, conjunctiva and sclera clear with out nystagmus.    Neck:      no masses, thyromegaly,  trachea central with normal respiratory effort and PMI displaced laterally  Lungs:      Clear to auscultation bilaterally  Heart:       Regular rate and rhythm  Msk:      no deformity or scoliosis noted of thoracic or lumbar spine.    Pulses:      pulses normal in all 4 extremities.    Extremities:       No lower extremity edema  Neurologic:      no focal deficits.   alert oriented x3  Skin:      intact without lesions or rashes.    Psych:      alert and cooperative; normal mood and affect; normal attention span and concentration.      Result Review :               Pertinent cardiac  workup    1. Echo 2/22/2022 ejection fraction 60 to 65%      Procedures        Assessment and Plan      Shantel Maharaj is an 62-year-old female patient who has sick sinus syndrome status post dual-chamber pacemaker implantation.  Patient is doing well denies having any anginal symptoms or CHF symptoms, no syncopal episodes.  Pacemaker site looks good, incision healed well.  Remote interrogation showed appropriate function with no alerts.  Her blood pressure is well controlled.  For now we will continue same therapy I will see her in follow-up in 6 months.    Diagnoses and all orders for this visit:    1. Sick sinus syndrome (HCC) (Primary)  Overview:  Added automatically from request for surgery [id number]      2. Pacemaker    3. Primary hypertension           Return in about 6 months (around 10/1/2022).  Patient was given instructions and counseling regarding her condition or for health maintenance advice. Please see specific information pulled into the AVS if appropriate.

## 2022-04-04 ENCOUNTER — OFFICE VISIT (OUTPATIENT)
Dept: PAIN MEDICINE | Facility: CLINIC | Age: 62
End: 2022-04-04

## 2022-04-04 VITALS
SYSTOLIC BLOOD PRESSURE: 145 MMHG | BODY MASS INDEX: 27.64 KG/M2 | HEART RATE: 62 BPM | RESPIRATION RATE: 16 BRPM | DIASTOLIC BLOOD PRESSURE: 92 MMHG | WEIGHT: 161 LBS | OXYGEN SATURATION: 95 %

## 2022-04-04 DIAGNOSIS — M19.012 OSTEOARTHRITIS OF BOTH SHOULDERS, UNSPECIFIED OSTEOARTHRITIS TYPE: ICD-10-CM

## 2022-04-04 DIAGNOSIS — M19.011 OSTEOARTHRITIS OF BOTH SHOULDERS, UNSPECIFIED OSTEOARTHRITIS TYPE: ICD-10-CM

## 2022-04-04 DIAGNOSIS — M54.16 LUMBAR RADICULOPATHY: ICD-10-CM

## 2022-04-04 PROCEDURE — 99214 OFFICE O/P EST MOD 30 MIN: CPT | Performed by: STUDENT IN AN ORGANIZED HEALTH CARE EDUCATION/TRAINING PROGRAM

## 2022-04-04 RX ORDER — HYDROCODONE BITARTRATE AND ACETAMINOPHEN 7.5; 325 MG/1; MG/1
1 TABLET ORAL EVERY 8 HOURS PRN
Qty: 90 TABLET | Refills: 0 | Status: SHIPPED | OUTPATIENT
Start: 2022-04-04 | End: 2022-06-27 | Stop reason: SDUPTHER

## 2022-04-04 RX ORDER — HYDROCODONE BITARTRATE AND ACETAMINOPHEN 7.5; 325 MG/1; MG/1
1 TABLET ORAL EVERY 8 HOURS PRN
Qty: 90 TABLET | Refills: 0 | Status: SHIPPED | OUTPATIENT
Start: 2022-06-02 | End: 2022-06-27 | Stop reason: SDUPTHER

## 2022-04-04 RX ORDER — HYDROCODONE BITARTRATE AND ACETAMINOPHEN 7.5; 325 MG/1; MG/1
1 TABLET ORAL EVERY 8 HOURS PRN
Qty: 90 TABLET | Refills: 0 | Status: SHIPPED | OUTPATIENT
Start: 2022-05-03 | End: 2022-06-27 | Stop reason: SDUPTHER

## 2022-04-04 NOTE — PROGRESS NOTES
CHIEF COMPLAINT  Chief Complaint   Patient presents with   • Back Pain     2 month f/u for Lumbar radiculopathy and bilateral shoulder pain. Hydrocodone 04/04 @ 8am       Primary Care  Gris Dominguez MD    Subjective   Shantel Maharaj is a 62 y.o. female  who presents for follow-up.She states she has had longstanding low back pain and also developed bilateral shoulder and left arm pain following a mastectomy and lymph node stripping.  She was previously being evaluated by an outside pain clinic who is providing her narcotic pain medication as well as injections, but she does not know the type of injection she was receiving.  She states that additionally, she is required to attend chiropractic sessions which she thought were exacerbating her pain.  Given this, she wanted to transition care to [facility].    Back Pain  Associated symptoms include leg pain.   Arm Pain     Knee Pain     Leg Pain          Location: Low back without radiation, bilateral shoulders, left arm Onset: Many years ago  Duration: Constant, slowly progressing  Timing: Constant throughout the day  Quality: The pain in the shoulder is a sharp, stabbing pain.  The low back as an aching, cramping pain  Severity: Today: 6       Last Week: 6       Worst: 8  Modifying Factors: The pain is worse with walking and physical activity.  The pain is slightly improved with lying flat as well as medication and compression on the left arm     Physical Therapy: yes    Interval Update 04/04/2022: Good relief with her hydrocodone.  Denies side effects of sedation or constipation.  Recently underwent pacemaker placement.    The following portions of the patient's history were reviewed and updated as appropriate: allergies, current medications, past family history, past medical history, past social history, past surgical history and problem list.      Current Outpatient Medications:   •  anastrozole (ARIMIDEX) 1 MG tablet, Take 1 mg by mouth Daily., Disp: , Rfl:    •  atorvastatin (LIPITOR) 20 MG tablet, Take 1 tablet by mouth Every Night., Disp: 30 tablet, Rfl: 0  •  Calcium 600/Vitamin D 600-400 MG-UNIT chewable tablet, Chew 1 tablet 2 (Two) Times a Day., Disp: , Rfl:   •  cyclobenzaprine (FLEXERIL) 10 MG tablet, Take 1 tablet by mouth 3 (Three) Times a Day As Needed for Muscle Spasms., Disp: 90 tablet, Rfl: 2  •  dexlansoprazole (DEXILANT) 60 MG capsule, Take 60 mg by mouth Every Night., Disp: , Rfl:   •  escitalopram (LEXAPRO) 10 MG tablet, Take 10 mg by mouth Daily., Disp: , Rfl:   •  fenofibrate (TRICOR) 145 MG tablet, Take 145 mg by mouth As Needed., Disp: , Rfl:   •  ferrous sulfate 325 (65 FE) MG tablet, Take 325 mg by mouth As Needed., Disp: , Rfl:   •  fluticasone (FLONASE) 50 MCG/ACT nasal spray, 1 spray by Each Nare route As Needed. As needed, Disp: , Rfl:   •  fluticasone (FLONASE) 50 MCG/ACT nasal spray, 1 spray by Each Nare route Daily., Disp: , Rfl:   •  gabapentin (NEURONTIN) 100 MG capsule, Take 100 mg by mouth 3 (Three) Times a Day., Disp: , Rfl:   •  glipizide (GLUCOTROL XL) 2.5 MG 24 hr tablet, Take 2.5 mg by mouth Every Night., Disp: , Rfl:   •  HYDROcodone-acetaminophen (NORCO) 7.5-325 MG per tablet, Take 1 tablet by mouth Every 8 (Eight) Hours As Needed for Moderate Pain ., Disp: 90 tablet, Rfl: 0  •  Lancets (OneTouch Delica Plus [id number]) misc, 1 each Daily. Dx: E11.90, Disp: 100 each, Rfl: 2  •  levothyroxine (SYNTHROID, LEVOTHROID) 200 MCG tablet, Take 200 mcg by mouth Every Morning. Take with 50mcg for total daily dose 250mcg, Disp: , Rfl:   •  levothyroxine (SYNTHROID, LEVOTHROID) 50 MCG tablet, Take 50 mcg by mouth Every Morning. Take with 200mcg for total daily dose 250mcg, Disp: , Rfl:   •  lisinopril (PRINIVIL,ZESTRIL) 2.5 MG tablet, Take 1 tablet by mouth Daily., Disp: 30 tablet, Rfl: 0  •  nebivolol (BYSTOLIC) 5 MG tablet, Take 1 tablet by mouth Daily., Disp: 30 tablet, Rfl: 3  •  OneTouch Verio test strip, 1 each by Other route Daily.  Dx: E11.90 Use as instructed, Disp: 50 each, Rfl: 2  •  oxybutynin XL (DITROPAN-XL) 5 MG 24 hr tablet, Take 5 mg by mouth Daily., Disp: , Rfl:   •  polyethylene glycol (MIRALAX) 17 GM/SCOOP powder, Take 17 g by mouth Daily As Needed (Use if senna-docusate is ineffective)., Disp: 510 g, Rfl: 0  •  potassium chloride 10 MEQ CR tablet, Take 1 tablet by mouth 2 (Two) Times a Day., Disp: 15 tablet, Rfl: 0  •  promethazine (PHENERGAN) 25 MG tablet, Take 25 mg by mouth As Needed., Disp: , Rfl:   •  sennosides-docusate (PERICOLACE) 8.6-50 MG per tablet, Take 2 tablets by mouth 2 (Two) Times a Day As Needed for Constipation., Disp: 60 tablet, Rfl: 0  •  sucralfate (CARAFATE) 1 g tablet, Take 1 g by mouth Daily., Disp: , Rfl:   •  [START ON 5/3/2022] HYDROcodone-acetaminophen (NORCO) 7.5-325 MG per tablet, Take 1 tablet by mouth Every 8 (Eight) Hours As Needed for Moderate Pain ., Disp: 90 tablet, Rfl: 0  •  [START ON 6/2/2022] HYDROcodone-acetaminophen (NORCO) 7.5-325 MG per tablet, Take 1 tablet by mouth Every 8 (Eight) Hours As Needed for Moderate Pain ., Disp: 90 tablet, Rfl: 0    Review of Systems   Musculoskeletal: Positive for arthralgias and back pain.        Bilateral shoulder pain    Left arm swelling       Vitals:    04/04/22 1036   BP: 145/92   Pulse: 62   Resp: 16   SpO2: 95%   Weight: 73 kg (161 lb)   PainSc:   7       Urine Drug Screen: 10/18/2021  Appropriate: Yes    Objective   Physical Exam  Vitals and nursing note reviewed.   Constitutional:       General: She is not in acute distress.     Appearance: Normal appearance.   Musculoskeletal:      Comments: Left arm:  1.  Diffusely swollen to the level of the left shoulder.  2.  Left shoulder range of motion significantly decreased secondary to pain  3.  Left shoulder joint nontender to palpation.    Right arm:  1.  Nontender to palpation.  No swelling.  2.  Right shoulder tender to palpation across the supraspinatus tendon  3.  Extremely decreased range of  motion in all directions both passive and active secondary to pain     Lumbar spine exam:  1.  Facet loading equivocal bilaterally  2.  Facets weakly tender to palpation bilaterally  3.  Straight leg raise weakly positive bilaterally  4.  Lumbar range of motion decreased secondary to pain.   Neurological:      Mental Status: She is alert.      Sensory: No sensory deficit.      Motor: No weakness.           Assessment/Plan   Problems Addressed this Visit        Other    Osteoarthritis (Chronic)    Relevant Medications    HYDROcodone-acetaminophen (NORCO) 7.5-325 MG per tablet    HYDROcodone-acetaminophen (NORCO) 7.5-325 MG per tablet (Start on 5/3/2022)    HYDROcodone-acetaminophen (NORCO) 7.5-325 MG per tablet (Start on 6/2/2022)      Other Visit Diagnoses     Lumbar radiculopathy        Relevant Medications    HYDROcodone-acetaminophen (NORCO) 7.5-325 MG per tablet    HYDROcodone-acetaminophen (NORCO) 7.5-325 MG per tablet (Start on 5/3/2022)    HYDROcodone-acetaminophen (NORCO) 7.5-325 MG per tablet (Start on 6/2/2022)      Diagnoses       Codes Comments    Osteoarthritis of both shoulders, unspecified osteoarthritis type     ICD-10-CM: M19.011, M19.012  ICD-9-CM: 715.91     Lumbar radiculopathy     ICD-10-CM: M54.16  ICD-9-CM: 724.4           Plan:  1.  Still pending MRI  2.  Refill hydrocodone  3.  UDS appropriate  4.  Inspect appropriate    --- Follow-up 3 months           INSPECT REPORT    As part of the patient's treatment plan, I may be prescribing controlled substances. The patient has been made aware of appropriate use of such medications, including potential risk of somnolence, limited ability to drive and/or work safely, and the potential for dependence or overdose. It has also bee made clear that these medications are for use by this patient only, without concomitant use of alcohol or other substances unless prescribed.     Patient has completed prescribing agreement detailing terms of continued  prescribing of controlled substances, including monitoring JACKIE reports, urine drug screening, and pill counts if necessary. The patient is aware that inappropriate use will results in cessation of prescribing such medications.    INSPECT report has been reviewed and scanned into the patient's chart.    As the clinician, I personally reviewed the INSPECT from 4/3/2022.    History and physical exam exhibit continued safe and appropriate use of controlled substances.      EMR Dragon/Transcription disclaimer:   Much of this encounter note is an electronic transcription/translation of spoken language to printed text. The electronic translation of spoken language may permit erroneous, or at times, nonsensical words or phrases to be inadvertently transcribed; Although I have reviewed the note for such errors, some may still exist.

## 2022-04-21 ENCOUNTER — HOSPITAL ENCOUNTER (OUTPATIENT)
Dept: SLEEP MEDICINE | Facility: HOSPITAL | Age: 62
Discharge: HOME | End: 2022-04-21
Admitting: PSYCHIATRY & NEUROLOGY

## 2022-04-21 DIAGNOSIS — G47.33 OSA (OBSTRUCTIVE SLEEP APNEA): ICD-10-CM

## 2022-04-21 PROCEDURE — G0399 HOME SLEEP TEST/TYPE 3 PORTA: HCPCS | Performed by: PSYCHIATRY & NEUROLOGY

## 2022-04-21 PROCEDURE — 95806 SLEEP STUDY UNATT&RESP EFFT: CPT

## 2022-06-22 ENCOUNTER — TELEPHONE (OUTPATIENT)
Dept: NEUROLOGY | Facility: CLINIC | Age: 62
End: 2022-06-22

## 2022-06-27 ENCOUNTER — OFFICE VISIT (OUTPATIENT)
Dept: PAIN MEDICINE | Facility: CLINIC | Age: 62
End: 2022-06-27

## 2022-06-27 VITALS
HEART RATE: 80 BPM | DIASTOLIC BLOOD PRESSURE: 103 MMHG | OXYGEN SATURATION: 100 % | SYSTOLIC BLOOD PRESSURE: 195 MMHG | WEIGHT: 161 LBS | BODY MASS INDEX: 27.64 KG/M2 | RESPIRATION RATE: 16 BRPM

## 2022-06-27 DIAGNOSIS — M19.012 OSTEOARTHRITIS OF BOTH SHOULDERS, UNSPECIFIED OSTEOARTHRITIS TYPE: ICD-10-CM

## 2022-06-27 DIAGNOSIS — M19.011 OSTEOARTHRITIS OF BOTH SHOULDERS, UNSPECIFIED OSTEOARTHRITIS TYPE: ICD-10-CM

## 2022-06-27 DIAGNOSIS — M54.16 LUMBAR RADICULOPATHY: ICD-10-CM

## 2022-06-27 PROCEDURE — 99214 OFFICE O/P EST MOD 30 MIN: CPT | Performed by: STUDENT IN AN ORGANIZED HEALTH CARE EDUCATION/TRAINING PROGRAM

## 2022-06-27 RX ORDER — HYDROCODONE BITARTRATE AND ACETAMINOPHEN 7.5; 325 MG/1; MG/1
1 TABLET ORAL EVERY 8 HOURS PRN
Qty: 90 TABLET | Refills: 0 | Status: SHIPPED | OUTPATIENT
Start: 2022-09-05 | End: 2022-09-26 | Stop reason: SDUPTHER

## 2022-06-27 RX ORDER — HYDROCODONE BITARTRATE AND ACETAMINOPHEN 7.5; 325 MG/1; MG/1
1 TABLET ORAL EVERY 8 HOURS PRN
Qty: 90 TABLET | Refills: 0 | Status: SHIPPED | OUTPATIENT
Start: 2022-07-07 | End: 2022-09-21

## 2022-06-27 RX ORDER — HYDROCODONE BITARTRATE AND ACETAMINOPHEN 7.5; 325 MG/1; MG/1
1 TABLET ORAL EVERY 8 HOURS PRN
Qty: 90 TABLET | Refills: 0 | Status: SHIPPED | OUTPATIENT
Start: 2022-08-06 | End: 2022-09-21

## 2022-06-27 RX ORDER — AMLODIPINE BESYLATE 10 MG/1
10 TABLET ORAL DAILY
COMMUNITY
Start: 2022-06-13

## 2022-06-27 NOTE — PROGRESS NOTES
CHIEF COMPLAINT  Chief Complaint   Patient presents with   • Osteoarthritis     2 month f/u for both shoulders treated w/Hydrocodone 06/27 @ 7 am        Primary Care  Gris Dominguez MD    Subjective   Shantel Maharaj is a 62 y.o. female  who presents for follow-up.She states she has had longstanding low back pain and also developed bilateral shoulder and left arm pain following a mastectomy and lymph node stripping.  She was previously being evaluated by an outside pain clinic who is providing her narcotic pain medication as well as injections, but she does not know the type of injection she was receiving.  She states that additionally, she is required to attend chiropractic sessions which she thought were exacerbating her pain.  Given this, she wanted to transition care to [facility].    Back Pain  Associated symptoms include leg pain.   Arm Pain     Knee Pain     Leg Pain     Osteoarthritis  Her past medical history is significant for osteoarthritis.        Location: Low back without radiation, bilateral shoulders, left arm Onset: Many years ago  Duration: Constant, slowly progressing  Timing: Constant throughout the day  Quality: The pain in the shoulder is a sharp, stabbing pain.  The low back as an aching, cramping pain  Severity: Today: 7       Last Week: 6       Worst: 8  Modifying Factors: The pain is worse with walking and physical activity.  The pain is slightly improved with lying flat as well as medication and compression on the left arm     Physical Therapy: yes    Interval Update 06/27/2022: Good relief with her hydrocodone.  Denies side effects of sedation or constipation.  No significant changes    The following portions of the patient's history were reviewed and updated as appropriate: allergies, current medications, past family history, past medical history, past social history, past surgical history and problem list.      Current Outpatient Medications:   •  amLODIPine (NORVASC) 10 MG tablet,  Take 10 mg by mouth Daily., Disp: , Rfl:   •  anastrozole (ARIMIDEX) 1 MG tablet, Take 1 mg by mouth Daily., Disp: , Rfl:   •  atorvastatin (LIPITOR) 20 MG tablet, Take 1 tablet by mouth Every Night., Disp: 30 tablet, Rfl: 0  •  Calcium 600/Vitamin D 600-400 MG-UNIT chewable tablet, Chew 1 tablet 2 (Two) Times a Day., Disp: , Rfl:   •  cyclobenzaprine (FLEXERIL) 10 MG tablet, Take 1 tablet by mouth 3 (Three) Times a Day As Needed for Muscle Spasms., Disp: 90 tablet, Rfl: 2  •  dexlansoprazole (DEXILANT) 60 MG capsule, Take 60 mg by mouth Every Night., Disp: , Rfl:   •  escitalopram (LEXAPRO) 10 MG tablet, Take 10 mg by mouth Daily., Disp: , Rfl:   •  fenofibrate (TRICOR) 145 MG tablet, Take 145 mg by mouth As Needed., Disp: , Rfl:   •  ferrous sulfate 325 (65 FE) MG tablet, Take 325 mg by mouth As Needed., Disp: , Rfl:   •  fluticasone (FLONASE) 50 MCG/ACT nasal spray, 1 spray by Each Nare route As Needed. As needed, Disp: , Rfl:   •  fluticasone (FLONASE) 50 MCG/ACT nasal spray, 1 spray by Each Nare route Daily., Disp: , Rfl:   •  gabapentin (NEURONTIN) 100 MG capsule, Take 100 mg by mouth 3 (Three) Times a Day., Disp: , Rfl:   •  glipizide (GLUCOTROL XL) 2.5 MG 24 hr tablet, Take 2.5 mg by mouth Every Night., Disp: , Rfl:   •  [START ON 9/5/2022] HYDROcodone-acetaminophen (NORCO) 7.5-325 MG per tablet, Take 1 tablet by mouth Every 8 (Eight) Hours As Needed for Moderate Pain ., Disp: 90 tablet, Rfl: 0  •  [START ON 8/6/2022] HYDROcodone-acetaminophen (NORCO) 7.5-325 MG per tablet, Take 1 tablet by mouth Every 8 (Eight) Hours As Needed for Moderate Pain ., Disp: 90 tablet, Rfl: 0  •  [START ON 7/7/2022] HYDROcodone-acetaminophen (NORCO) 7.5-325 MG per tablet, Take 1 tablet by mouth Every 8 (Eight) Hours As Needed for Moderate Pain ., Disp: 90 tablet, Rfl: 0  •  Lancets (OneTouch Delica Plus [id number]) misc, 1 each Daily. Dx: E11.90, Disp: 100 each, Rfl: 2  •  levothyroxine (SYNTHROID, LEVOTHROID) 200 MCG tablet,  Take 200 mcg by mouth Every Morning. Take with 50mcg for total daily dose 250mcg, Disp: , Rfl:   •  levothyroxine (SYNTHROID, LEVOTHROID) 50 MCG tablet, Take 50 mcg by mouth Every Morning. Take with 200mcg for total daily dose 250mcg, Disp: , Rfl:   •  lisinopril (PRINIVIL,ZESTRIL) 2.5 MG tablet, Take 1 tablet by mouth Daily., Disp: 30 tablet, Rfl: 0  •  nebivolol (BYSTOLIC) 5 MG tablet, Take 1 tablet by mouth Daily., Disp: 30 tablet, Rfl: 3  •  OneTouch Verio test strip, 1 each by Other route Daily. Dx: E11.90 Use as instructed, Disp: 50 each, Rfl: 2  •  oxybutynin XL (DITROPAN-XL) 5 MG 24 hr tablet, Take 5 mg by mouth Daily., Disp: , Rfl:   •  polyethylene glycol (MIRALAX) 17 GM/SCOOP powder, Take 17 g by mouth Daily As Needed (Use if senna-docusate is ineffective)., Disp: 510 g, Rfl: 0  •  potassium chloride 10 MEQ CR tablet, Take 1 tablet by mouth 2 (Two) Times a Day., Disp: 15 tablet, Rfl: 0  •  promethazine (PHENERGAN) 25 MG tablet, Take 25 mg by mouth As Needed., Disp: , Rfl:   •  sennosides-docusate (PERICOLACE) 8.6-50 MG per tablet, Take 2 tablets by mouth 2 (Two) Times a Day As Needed for Constipation., Disp: 60 tablet, Rfl: 0  •  sucralfate (CARAFATE) 1 g tablet, Take 1 g by mouth Daily., Disp: , Rfl:     Review of Systems   Musculoskeletal: Positive for arthralgias and back pain.        Bilateral shoulder pain    Left arm swelling       Vitals:    06/27/22 0805   BP: (!) 195/103   Pulse: 80   Resp: 16   SpO2: 100%   Weight: 73 kg (161 lb)   PainSc:   7       Urine Drug Screen: 10/18/2021  Appropriate: Yes    Objective   Physical Exam  Vitals and nursing note reviewed.   Constitutional:       General: She is not in acute distress.     Appearance: Normal appearance.   Musculoskeletal:      Comments: Left arm:  1.  Diffusely swollen to the level of the left shoulder.  2.  Left shoulder range of motion significantly decreased secondary to pain  3.  Left shoulder joint nontender to palpation.    Right  arm:  1.  Nontender to palpation.  No swelling.  2.  Right shoulder tender to palpation across the supraspinatus tendon  3.  Extremely decreased range of motion in all directions both passive and active secondary to pain     Lumbar spine exam:  1.  Facet loading equivocal bilaterally  2.  Facets weakly tender to palpation bilaterally  3.  Straight leg raise weakly positive bilaterally  4.  Lumbar range of motion decreased secondary to pain.   Neurological:      Mental Status: She is alert.      Sensory: No sensory deficit.      Motor: No weakness.           Assessment & Plan   Problems Addressed this Visit        Other    Osteoarthritis (Chronic)    Relevant Medications    HYDROcodone-acetaminophen (NORCO) 7.5-325 MG per tablet (Start on 9/5/2022)    HYDROcodone-acetaminophen (NORCO) 7.5-325 MG per tablet (Start on 8/6/2022)    HYDROcodone-acetaminophen (NORCO) 7.5-325 MG per tablet (Start on 7/7/2022)      Other Visit Diagnoses     Lumbar radiculopathy        Relevant Medications    HYDROcodone-acetaminophen (NORCO) 7.5-325 MG per tablet (Start on 9/5/2022)    HYDROcodone-acetaminophen (NORCO) 7.5-325 MG per tablet (Start on 8/6/2022)    HYDROcodone-acetaminophen (NORCO) 7.5-325 MG per tablet (Start on 7/7/2022)      Diagnoses       Codes Comments    Osteoarthritis of both shoulders, unspecified osteoarthritis type     ICD-10-CM: M19.011, M19.012  ICD-9-CM: 715.91     Lumbar radiculopathy     ICD-10-CM: M54.16  ICD-9-CM: 724.4           Plan:  1.  Still pending MRI  2.  Refill hydrocodone  3.  UDS appropriate  4.  Inspect appropriate    --- Follow-up 3 months           INSPECT REPORT    As part of the patient's treatment plan, I may be prescribing controlled substances. The patient has been made aware of appropriate use of such medications, including potential risk of somnolence, limited ability to drive and/or work safely, and the potential for dependence or overdose. It has also bee made clear that these  medications are for use by this patient only, without concomitant use of alcohol or other substances unless prescribed.     Patient has completed prescribing agreement detailing terms of continued prescribing of controlled substances, including monitoring JACKIE reports, urine drug screening, and pill counts if necessary. The patient is aware that inappropriate use will results in cessation of prescribing such medications.    INSPECT report has been reviewed and scanned into the patient's chart.    As the clinician, I personally reviewed the INSPECT from 6/23/2020.    History and physical exam exhibit continued safe and appropriate use of controlled substances.      EMR Dragon/Transcription disclaimer:   Much of this encounter note is an electronic transcription/translation of spoken language to printed text. The electronic translation of spoken language may permit erroneous, or at times, nonsensical words or phrases to be inadvertently transcribed; Although I have reviewed the note for such errors, some may still exist.

## 2022-09-05 PROCEDURE — 93294 REM INTERROG EVL PM/LDLS PM: CPT | Performed by: INTERNAL MEDICINE

## 2022-09-05 PROCEDURE — 93296 REM INTERROG EVL PM/IDS: CPT | Performed by: INTERNAL MEDICINE

## 2022-09-21 ENCOUNTER — OFFICE VISIT (OUTPATIENT)
Dept: CARDIOLOGY | Facility: CLINIC | Age: 62
End: 2022-09-21

## 2022-09-21 ENCOUNTER — CLINICAL SUPPORT NO REQUIREMENTS (OUTPATIENT)
Dept: CARDIOLOGY | Facility: CLINIC | Age: 62
End: 2022-09-21

## 2022-09-21 VITALS
WEIGHT: 160 LBS | BODY MASS INDEX: 27.31 KG/M2 | HEIGHT: 64 IN | DIASTOLIC BLOOD PRESSURE: 97 MMHG | OXYGEN SATURATION: 98 % | SYSTOLIC BLOOD PRESSURE: 156 MMHG | HEART RATE: 73 BPM

## 2022-09-21 DIAGNOSIS — I49.5 SICK SINUS SYNDROME: Primary | ICD-10-CM

## 2022-09-21 DIAGNOSIS — I95.1 ORTHOSTATIC HYPOTENSION: ICD-10-CM

## 2022-09-21 DIAGNOSIS — I20.8 STABLE ANGINA PECTORIS: Primary | ICD-10-CM

## 2022-09-21 DIAGNOSIS — Z95.0 PACEMAKER: ICD-10-CM

## 2022-09-21 DIAGNOSIS — I49.5 SICK SINUS SYNDROME: ICD-10-CM

## 2022-09-21 DIAGNOSIS — I10 PRIMARY HYPERTENSION: Chronic | ICD-10-CM

## 2022-09-21 PROCEDURE — 93000 ELECTROCARDIOGRAM COMPLETE: CPT | Performed by: INTERNAL MEDICINE

## 2022-09-21 PROCEDURE — 99214 OFFICE O/P EST MOD 30 MIN: CPT | Performed by: INTERNAL MEDICINE

## 2022-09-21 PROCEDURE — 93280 PM DEVICE PROGR EVAL DUAL: CPT | Performed by: INTERNAL MEDICINE

## 2022-09-21 RX ORDER — LOSARTAN POTASSIUM 25 MG/1
1 TABLET ORAL DAILY
COMMUNITY
Start: 2022-09-18 | End: 2023-01-09

## 2022-09-21 RX ORDER — ATORVASTATIN CALCIUM 20 MG/1
20 TABLET, FILM COATED ORAL NIGHTLY
Qty: 90 TABLET | Refills: 3 | Status: SHIPPED | OUTPATIENT
Start: 2022-09-21

## 2022-09-21 RX ORDER — NEBIVOLOL 10 MG/1
1 TABLET ORAL DAILY
COMMUNITY
Start: 2022-09-20

## 2022-09-21 RX ORDER — TRIAMCINOLONE ACETONIDE 5 MG/G
CREAM TOPICAL
COMMUNITY
Start: 2022-08-22

## 2022-09-21 NOTE — PROGRESS NOTES
Progress note      Name: Shantel Maharaj ADMIT: (Not on file)   : 1960  PCP: Gris Dominguez MD    MRN: [id number] LOS: 0 days   AGE/SEX: 62 y.o. female  ROOM: Room/bed info not found     Chief Complaint   Patient presents with   • Hypertension   • Chest Pain   • Pacemaker Check       Subjective       History of present illness  Shantel Maharaj is a 62-year-old female patient's who has hypertension, dyslipidemia, diabetes, history of breast cancer with bilateral mastectomy, chronically swollen left arm due to lymphedema, sick sinus syndrome status post dual-chamber pacemaker implantation on 2022 right-sided, here today for follow-up.  Patient has been doing well, however few weeks ago she had an episode of severe chest pain which woke her up at night which lasted for about 10 to 15 minutes.  The incident has not recurred yet.  Patient however is concerned about it.      Past Medical History:   Diagnosis Date   • Arm pain    • Breast cancer (HCC)    • Cancer (HCC)     lt breast -- reocc rt breast 2020   • COVID-19    • Diabetes (HCC)    • Drug therapy    • High cholesterol    • Hx of radiation therapy    • Hypertension    • Low back pain    • Osteoarthritis    • Osteopenia    • Thyroid disease      Past Surgical History:   Procedure Laterality Date   • APPENDECTOMY     • BREAST LUMPECTOMY      2021-- [facility]-- cancer-- reocc   • BREAST SURGERY      removal of left breast    • CARDIAC ELECTROPHYSIOLOGY PROCEDURE N/A 2022    Procedure: Pacemaker DC new-[place];  Surgeon: Ramsey Dinero MD;  Location: [facility] INVASIVE LOCATION;  Service: Cardiology;  Laterality: N/A;   •  SECTION      x2   • GALLBLADDER SURGERY     • INSERT / REPLACE / REMOVE PACEMAKER     • KNEE SURGERY      left knee - petella broken    • OTHER SURGICAL HISTORY      rt arm surgery with shlomo  placement rt arm   • SHOULDER SURGERY      broken-lt- in 12 places   • WRIST SURGERY      rt - broken     Family History    Problem Relation Age of Onset   • Cancer Mother    • Breast cancer Mother    • Heart disease Father         Matthias   • Hypertension Father    • Breast cancer Maternal Aunt    • Breast cancer Maternal Aunt    • Breast cancer Maternal Aunt      Social History     Tobacco Use   • Smoking status: Former Smoker     Packs/day: 1.00     Types: Cigarettes   • Smokeless tobacco: Never Used   • Tobacco comment: quit  smoking in 2008   Vaping Use   • Vaping Use: Never used   Substance Use Topics   • Alcohol use: Never   • Drug use: Never     (Not in a hospital admission)    Allergies:  Valsartan      Physical Exam  VITALS REVIEWED    General:      well developed, in no acute distress.    Head:      normocephalic and atraumatic.    Eyes:      PERRL/EOM intact, conjunctiva and sclera clear with out nystagmus.    Neck:      no masses, thyromegaly,  trachea central with normal respiratory effort and PMI displaced laterally  Lungs:      Clear to auscultation bilaterally  Heart:       Regular rate and rhythm  Msk:      no deformity or scoliosis noted of thoracic or lumbar spine.    Pulses:      pulses normal in all 4 extremities.    Extremities:       No lower extremity edema  Neurologic:      no focal deficits.   alert oriented x3  Skin:      intact without lesions or rashes.    Psych:      alert and cooperative; normal mood and affect; normal attention span and concentration.      Result Review :               Pertinent cardiac workup    1. Echo 2/22/2022 ejection fraction 60 to 65%          ECG 12 Lead    Date/Time: 9/21/2022 12:40 PM  Performed by: Ramsey Dinero MD  Authorized by: Ramsey Dinero MD   Comparison: compared with previous ECG   Similar to previous ECG  Rhythm: sinus rhythm  Rate: normal  BPM: 77  Conduction: conduction normal  ST Segments: ST segments normal  QRS axis: normal  Other findings: non-specific ST-T wave changes                Assessment and Plan      Shantel Maharaj is a 62-year-old female  patient who has sick sinus syndrome has a dual-chamber pacemaker.  Device interrogation showed appropriate function she is pacing less than 10% in atrium, no arrhythmias.  Patient had an episode of severe chest pain several weeks back which woke her up from her sleep.  I would like to get a stress test to assess her coronary perfusion.  We will call her with the results, if abnormal then she will need to have a heart catheterization, otherwise I will see her in follow-up in 6 months.  During this visit we also talked about her blood pressure, patient is on 3 antihypertensives at this time, advised her to keep a close log of her blood pressure prior to making any further adjustments.    Diagnoses and all orders for this visit:    1. Stable angina pectoris (HCC) (Primary)  -     Stress Test With Myocardial Perfusion One Day; Future    2. Sick sinus syndrome (HCC)  Overview:  Added automatically from request for surgery [id number]      3. Pacemaker    4. Orthostatic hypotension    5. Primary hypertension    Other orders  -     atorvastatin (LIPITOR) 20 MG tablet; Take 1 tablet by mouth Every Night.  Dispense: 90 tablet; Refill: 3           Return in about 6 months (around 3/21/2023).  Patient was given instructions and counseling regarding her condition or for health maintenance advice. Please see specific information pulled into the AVS if appropriate.

## 2022-09-26 ENCOUNTER — OFFICE VISIT (OUTPATIENT)
Dept: PAIN MEDICINE | Facility: CLINIC | Age: 62
End: 2022-09-26

## 2022-09-26 VITALS
OXYGEN SATURATION: 99 % | HEART RATE: 82 BPM | SYSTOLIC BLOOD PRESSURE: 198 MMHG | RESPIRATION RATE: 16 BRPM | DIASTOLIC BLOOD PRESSURE: 104 MMHG

## 2022-09-26 DIAGNOSIS — Z79.899 HIGH RISK MEDICATION USE: Primary | ICD-10-CM

## 2022-09-26 DIAGNOSIS — M19.011 OSTEOARTHRITIS OF BOTH SHOULDERS, UNSPECIFIED OSTEOARTHRITIS TYPE: ICD-10-CM

## 2022-09-26 DIAGNOSIS — M54.16 LUMBAR RADICULOPATHY: ICD-10-CM

## 2022-09-26 DIAGNOSIS — M19.012 OSTEOARTHRITIS OF BOTH SHOULDERS, UNSPECIFIED OSTEOARTHRITIS TYPE: ICD-10-CM

## 2022-09-26 PROCEDURE — 99214 OFFICE O/P EST MOD 30 MIN: CPT | Performed by: STUDENT IN AN ORGANIZED HEALTH CARE EDUCATION/TRAINING PROGRAM

## 2022-09-26 RX ORDER — HYDROCODONE BITARTRATE AND ACETAMINOPHEN 7.5; 325 MG/1; MG/1
1 TABLET ORAL EVERY 6 HOURS PRN
Qty: 90 TABLET | Refills: 0 | Status: SHIPPED | OUTPATIENT
Start: 2022-10-20 | End: 2023-01-09 | Stop reason: SDUPTHER

## 2022-09-26 RX ORDER — CYANOCOBALAMIN 1000 UG/ML
INJECTION, SOLUTION INTRAMUSCULAR; SUBCUTANEOUS
COMMUNITY
Start: 2022-09-21

## 2022-09-26 RX ORDER — HYDROCODONE BITARTRATE AND ACETAMINOPHEN 7.5; 325 MG/1; MG/1
1 TABLET ORAL EVERY 8 HOURS PRN
Qty: 90 TABLET | Refills: 0 | Status: SHIPPED | OUTPATIENT
Start: 2022-11-19 | End: 2023-01-09 | Stop reason: SDUPTHER

## 2022-09-26 RX ORDER — LEVOTHYROXINE SODIUM 0.1 MG/1
TABLET ORAL
COMMUNITY
Start: 2022-09-19

## 2022-09-26 RX ORDER — HYDROCODONE BITARTRATE AND ACETAMINOPHEN 7.5; 325 MG/1; MG/1
1 TABLET ORAL EVERY 8 HOURS PRN
Qty: 90 TABLET | Refills: 0 | Status: SHIPPED | OUTPATIENT
Start: 2022-12-17 | End: 2023-01-09 | Stop reason: SDUPTHER

## 2022-09-26 NOTE — PROGRESS NOTES
CHIEF COMPLAINT  Chief Complaint   Patient presents with   • Osteoarthritis     3 month f/u for bilateral shoulder pain treated w/Hydrocodone last taken was 2 days ago       Primary Care  Gris Dominguez MD    Subjective   Shantel Maharaj is a 62 y.o. female  who presents for follow-up.She states she has had longstanding low back pain and also developed bilateral shoulder and left arm pain following a mastectomy and lymph node stripping.  She was previously being evaluated by an outside pain clinic who is providing her narcotic pain medication as well as injections, but she does not know the type of injection she was receiving.  She states that additionally, she is required to attend chiropractic sessions which she thought were exacerbating her pain.  Given this, she wanted to transition care to [facility].    Osteoarthritis  Her past medical history is significant for osteoarthritis.   Back Pain  Associated symptoms include leg pain.   Arm Pain     Knee Pain     Leg Pain          Location: Low back without radiation, bilateral shoulders, left arm Onset: Many years ago  Duration: Constant, slowly progressing  Timing: Constant throughout the day  Quality: The pain in the shoulder is a sharp, stabbing pain.  The low back as an aching, cramping pain  Severity: Today: 7       Last Week: 6       Worst: 8  Modifying Factors: The pain is worse with walking and physical activity.  The pain is slightly improved with lying flat as well as medication and compression on the left arm     Physical Therapy: yes    Interval Update 09/26/2022: Essentially unchanged.  Complaining of significant nausea with B12 injections.  Denies constipation or sedation.    The following portions of the patient's history were reviewed and updated as appropriate: allergies, current medications, past family history, past medical history, past social history, past surgical history and problem list.      Current Outpatient Medications:   •   amLODIPine (NORVASC) 10 MG tablet, Take 10 mg by mouth Daily., Disp: , Rfl:   •  anastrozole (ARIMIDEX) 1 MG tablet, Take 1 mg by mouth Daily., Disp: , Rfl:   •  atorvastatin (LIPITOR) 20 MG tablet, Take 1 tablet by mouth Every Night., Disp: 90 tablet, Rfl: 3  •  Calcium 600/Vitamin D 600-400 MG-UNIT chewable tablet, Chew 1 tablet 2 (Two) Times a Day., Disp: , Rfl:   •  cyanocobalamin 1000 MCG/ML injection, INJECT 1ML ONCE MONTHLY, Disp: , Rfl:   •  cyclobenzaprine (FLEXERIL) 10 MG tablet, Take 1 tablet by mouth 3 (Three) Times a Day As Needed for Muscle Spasms., Disp: 90 tablet, Rfl: 2  •  dexlansoprazole (DEXILANT) 60 MG capsule, Take 60 mg by mouth Every Night., Disp: , Rfl:   •  escitalopram (LEXAPRO) 10 MG tablet, Take 10 mg by mouth Daily., Disp: , Rfl:   •  fenofibrate (TRICOR) 145 MG tablet, Take 145 mg by mouth As Needed., Disp: , Rfl:   •  ferrous sulfate 325 (65 FE) MG tablet, Take 325 mg by mouth As Needed., Disp: , Rfl:   •  fluticasone (FLONASE) 50 MCG/ACT nasal spray, 1 spray by Each Nare route As Needed. As needed, Disp: , Rfl:   •  gabapentin (NEURONTIN) 100 MG capsule, Take 100 mg by mouth 3 (Three) Times a Day As Needed., Disp: , Rfl:   •  glipizide (GLUCOTROL XL) 2.5 MG 24 hr tablet, Take 2.5 mg by mouth Every Night., Disp: , Rfl:   •  [START ON 12/17/2022] HYDROcodone-acetaminophen (NORCO) 7.5-325 MG per tablet, Take 1 tablet by mouth Every 8 (Eight) Hours As Needed for Moderate Pain., Disp: 90 tablet, Rfl: 0  •  Lancets (OneTouch Delica Plus [id number]) misc, 1 each Daily. Dx: E11.90, Disp: 100 each, Rfl: 2  •  levothyroxine (SYNTHROID, LEVOTHROID) 100 MCG tablet, TAKE 1 (ONE) TABLET DAILY WITH 200MCG TO MAKE 300 MCG, Disp: , Rfl:   •  lisinopril (PRINIVIL,ZESTRIL) 2.5 MG tablet, Take 1 tablet by mouth Daily., Disp: 30 tablet, Rfl: 0  •  losartan (COZAAR) 25 MG tablet, Take 1 tablet by mouth Daily., Disp: , Rfl:   •  nebivolol (BYSTOLIC) 10 MG tablet, Take 1 tablet by mouth Daily., Disp: , Rfl:    •  OneTouch Verio test strip, 1 each by Other route Daily. Dx: E11.90 Use as instructed, Disp: 50 each, Rfl: 2  •  oxybutynin XL (DITROPAN-XL) 5 MG 24 hr tablet, Take 5 mg by mouth Daily., Disp: , Rfl:   •  polyethylene glycol (MIRALAX) 17 GM/SCOOP powder, Take 17 g by mouth Daily As Needed (Use if senna-docusate is ineffective)., Disp: 510 g, Rfl: 0  •  potassium chloride 10 MEQ CR tablet, Take 1 tablet by mouth 2 (Two) Times a Day., Disp: 15 tablet, Rfl: 0  •  promethazine (PHENERGAN) 25 MG tablet, Take 25 mg by mouth As Needed., Disp: , Rfl:   •  sennosides-docusate (PERICOLACE) 8.6-50 MG per tablet, Take 2 tablets by mouth 2 (Two) Times a Day As Needed for Constipation., Disp: 60 tablet, Rfl: 0  •  sucralfate (CARAFATE) 1 g tablet, Take 1 g by mouth Daily., Disp: , Rfl:   •  triamcinolone (KENALOG) 0.5 % cream, 1 (ONE) APPLICATION TO AFFECTED AREAS TWICE DAILY, Disp: , Rfl:   •  [START ON 11/19/2022] HYDROcodone-acetaminophen (NORCO) 7.5-325 MG per tablet, Take 1 tablet by mouth Every 8 (Eight) Hours As Needed for Moderate Pain., Disp: 90 tablet, Rfl: 0  •  [START ON 10/20/2022] HYDROcodone-acetaminophen (NORCO) 7.5-325 MG per tablet, Take 1 tablet by mouth Every 6 (Six) Hours As Needed for Moderate Pain., Disp: 90 tablet, Rfl: 0    Review of Systems   Musculoskeletal: Positive for arthralgias and back pain.        Bilateral shoulder pain    Left arm swelling       Vitals:    09/26/22 0804   BP: (!) 198/104   Pulse: 82   Resp: 16   SpO2: 99%   PainSc:   5       Urine Drug Screen: 9/26/2022  Appropriate: Pending    Objective   Physical Exam  Vitals and nursing note reviewed.   Constitutional:       General: She is not in acute distress.     Appearance: Normal appearance.   Musculoskeletal:      Comments: Left arm:  1.  Diffusely swollen to the level of the left shoulder.  2.  Left shoulder range of motion significantly decreased secondary to pain  3.  Left shoulder joint nontender to palpation.    Right  arm:  1.  Nontender to palpation.  No swelling.  2.  Right shoulder tender to palpation across the supraspinatus tendon  3.  Extremely decreased range of motion in all directions both passive and active secondary to pain     Lumbar spine exam:  1.  Facet loading equivocal bilaterally  2.  Facets weakly tender to palpation bilaterally  3.  Straight leg raise weakly positive bilaterally  4.  Lumbar range of motion decreased secondary to pain.   Neurological:      Mental Status: She is alert.      Sensory: No sensory deficit.      Motor: No weakness.           Assessment & Plan   Problems Addressed this Visit        Other    Osteoarthritis (Chronic)    Relevant Medications    HYDROcodone-acetaminophen (NORCO) 7.5-325 MG per tablet (Start on 12/17/2022)    HYDROcodone-acetaminophen (NORCO) 7.5-325 MG per tablet (Start on 11/19/2022)    HYDROcodone-acetaminophen (NORCO) 7.5-325 MG per tablet (Start on 10/20/2022)      Other Visit Diagnoses     Lumbar radiculopathy        Relevant Medications    HYDROcodone-acetaminophen (NORCO) 7.5-325 MG per tablet (Start on 12/17/2022)    HYDROcodone-acetaminophen (NORCO) 7.5-325 MG per tablet (Start on 11/19/2022)    HYDROcodone-acetaminophen (NORCO) 7.5-325 MG per tablet (Start on 10/20/2022)      Diagnoses       Codes Comments    Osteoarthritis of both shoulders, unspecified osteoarthritis type     ICD-10-CM: M19.011, M19.012  ICD-9-CM: 715.91     Lumbar radiculopathy     ICD-10-CM: M54.16  ICD-9-CM: 724.4           Plan:  1.  Still pending MRI  2.  Refill hydrocodone  3.  Repeat UDS today  4.  Inspect appropriate    --- Follow-up 3 months           INSPECT REPORT    As part of the patient's treatment plan, I may be prescribing controlled substances. The patient has been made aware of appropriate use of such medications, including potential risk of somnolence, limited ability to drive and/or work safely, and the potential for dependence or overdose. It has also bee made clear that  these medications are for use by this patient only, without concomitant use of alcohol or other substances unless prescribed.     Patient has completed prescribing agreement detailing terms of continued prescribing of controlled substances, including monitoring JACKIE reports, urine drug screening, and pill counts if necessary. The patient is aware that inappropriate use will results in cessation of prescribing such medications.    INSPECT report has been reviewed and scanned into the patient's chart.    As the clinician, I personally reviewed the INSPECT from 9/23/2022.    History and physical exam exhibit continued safe and appropriate use of controlled substances.      EMR Dragon/Transcription disclaimer:   Much of this encounter note is an electronic transcription/translation of spoken language to printed text. The electronic translation of spoken language may permit erroneous, or at times, nonsensical words or phrases to be inadvertently transcribed; Although I have reviewed the note for such errors, some may still exist.

## 2022-10-04 ENCOUNTER — HOSPITAL ENCOUNTER (OUTPATIENT)
Dept: CARDIOLOGY | Facility: HOSPITAL | Age: 62
Discharge: HOME | End: 2022-10-04

## 2022-10-04 DIAGNOSIS — I20.8 STABLE ANGINA PECTORIS: ICD-10-CM

## 2022-10-04 PROCEDURE — 93016 CV STRESS TEST SUPVJ ONLY: CPT | Performed by: INTERNAL MEDICINE

## 2022-10-04 PROCEDURE — 0 TECHNETIUM SESTAMIBI: Performed by: INTERNAL MEDICINE

## 2022-10-04 PROCEDURE — A9500 TC99M SESTAMIBI: HCPCS | Performed by: INTERNAL MEDICINE

## 2022-10-04 PROCEDURE — 78452 HT MUSCLE IMAGE SPECT MULT: CPT

## 2022-10-04 PROCEDURE — 93018 CV STRESS TEST I&R ONLY: CPT | Performed by: INTERNAL MEDICINE

## 2022-10-04 PROCEDURE — 78452 HT MUSCLE IMAGE SPECT MULT: CPT | Performed by: INTERNAL MEDICINE

## 2022-10-04 PROCEDURE — 93017 CV STRESS TEST TRACING ONLY: CPT

## 2022-10-04 PROCEDURE — 25010000002 REGADENOSON 0.4 MG/5ML SOLUTION: Performed by: INTERNAL MEDICINE

## 2022-10-04 RX ADMIN — TECHNETIUM TC 99M SESTAMIBI 1 DOSE: 1 INJECTION INTRAVENOUS at 12:39

## 2022-10-04 RX ADMIN — TECHNETIUM TC 99M SESTAMIBI 1 DOSE: 1 INJECTION INTRAVENOUS at 14:42

## 2022-10-04 RX ADMIN — REGADENOSON 0.4 MG: 0.08 INJECTION, SOLUTION INTRAVENOUS at 14:42

## 2022-10-09 LAB
BH CV REST NUCLEAR ISOTOPE DOSE: 10.5 MCI
BH CV STRESS BP STAGE 1: NORMAL
BH CV STRESS COMMENTS STAGE 1: NORMAL
BH CV STRESS DOSE REGADENOSON STAGE 1: 0.4
BH CV STRESS DURATION MIN STAGE 1: 0
BH CV STRESS DURATION SEC STAGE 1: 10
BH CV STRESS HR STAGE 1: 60
BH CV STRESS NUCLEAR ISOTOPE DOSE: 30.4 MCI
BH CV STRESS PROTOCOL 1: NORMAL
BH CV STRESS RECOVERY BP: NORMAL MMHG
BH CV STRESS RECOVERY HR: 81 BPM
BH CV STRESS STAGE 1: 1
LV EF NUC BP: 64 %
MAXIMAL PREDICTED HEART RATE: 156 BPM
STRESS BASELINE BP: NORMAL MMHG
STRESS BASELINE HR: 60 BPM
STRESS TARGET HR: 133 BPM

## 2022-12-05 PROCEDURE — 93296 REM INTERROG EVL PM/IDS: CPT | Performed by: INTERNAL MEDICINE

## 2022-12-05 PROCEDURE — 93294 REM INTERROG EVL PM/LDLS PM: CPT | Performed by: INTERNAL MEDICINE

## 2023-01-09 ENCOUNTER — OFFICE VISIT (OUTPATIENT)
Dept: PAIN MEDICINE | Facility: CLINIC | Age: 63
End: 2023-01-09
Payer: MEDICARE

## 2023-01-09 VITALS
DIASTOLIC BLOOD PRESSURE: 114 MMHG | OXYGEN SATURATION: 98 % | HEART RATE: 89 BPM | SYSTOLIC BLOOD PRESSURE: 187 MMHG | RESPIRATION RATE: 16 BRPM

## 2023-01-09 DIAGNOSIS — M19.011 OSTEOARTHRITIS OF BOTH SHOULDERS, UNSPECIFIED OSTEOARTHRITIS TYPE: ICD-10-CM

## 2023-01-09 DIAGNOSIS — M54.16 LUMBAR RADICULOPATHY: ICD-10-CM

## 2023-01-09 DIAGNOSIS — M19.012 OSTEOARTHRITIS OF BOTH SHOULDERS, UNSPECIFIED OSTEOARTHRITIS TYPE: ICD-10-CM

## 2023-01-09 PROCEDURE — 99214 OFFICE O/P EST MOD 30 MIN: CPT | Performed by: STUDENT IN AN ORGANIZED HEALTH CARE EDUCATION/TRAINING PROGRAM

## 2023-01-09 RX ORDER — HYDROCODONE BITARTRATE AND ACETAMINOPHEN 7.5; 325 MG/1; MG/1
1 TABLET ORAL EVERY 8 HOURS PRN
Qty: 90 TABLET | Refills: 0 | Status: SHIPPED | OUTPATIENT
Start: 2023-02-18

## 2023-01-09 RX ORDER — LISINOPRIL 5 MG/1
5 TABLET ORAL DAILY
COMMUNITY
Start: 2022-12-05

## 2023-01-09 RX ORDER — HYDROCODONE BITARTRATE AND ACETAMINOPHEN 7.5; 325 MG/1; MG/1
1 TABLET ORAL EVERY 6 HOURS PRN
Qty: 90 TABLET | Refills: 0 | Status: SHIPPED | OUTPATIENT
Start: 2023-01-20

## 2023-01-09 RX ORDER — HYDROCODONE BITARTRATE AND ACETAMINOPHEN 7.5; 325 MG/1; MG/1
1 TABLET ORAL EVERY 8 HOURS PRN
Qty: 90 TABLET | Refills: 0 | Status: SHIPPED | OUTPATIENT
Start: 2023-03-17

## 2023-01-09 NOTE — PROGRESS NOTES
CHIEF COMPLAINT  Chief Complaint   Patient presents with   • Osteoarthritis     3 month f/u  CC  Bilateral shoulder pain  treated w/Hydrocodone 01/09 @ 3 am        Primary Care  Gris Dominguez MD    Subjective   Shantel Maharaj is a 62 y.o. female  who presents for follow-up.She states she has had longstanding low back pain and also developed bilateral shoulder and left arm pain following a mastectomy and lymph node stripping.  She was previously being evaluated by an outside pain clinic who is providing her narcotic pain medication as well as injections, but she does not know the type of injection she was receiving.  She states that additionally, she is required to attend chiropractic sessions which she thought were exacerbating her pain.  Given this, she wanted to transition care to [facility].    Osteoarthritis  Her past medical history is significant for osteoarthritis.   Back Pain  Associated symptoms include leg pain.   Arm Pain     Knee Pain     Leg Pain          Location: Low back without radiation, bilateral shoulders, left arm Onset: Many years ago  Duration: Constant, slowly progressing  Timing: Constant throughout the day  Quality: The pain in the shoulder is a sharp, stabbing pain.  The low back as an aching, cramping pain  Severity: Today: 6       Last Week: 6       Worst: 8  Modifying Factors: The pain is worse with walking and physical activity.  The pain is slightly improved with lying flat as well as medication and compression on the left arm     Physical Therapy: yes    Interval Update 01/09/2023: Continues with some benefit with her hydrocodone 7.5 mg 3 times daily.  Denies constipation or sedation.    The following portions of the patient's history were reviewed and updated as appropriate: allergies, current medications, past family history, past medical history, past social history, past surgical history and problem list.      Current Outpatient Medications:   •  amLODIPine (NORVASC) 10 MG  tablet, Take 10 mg by mouth Daily., Disp: , Rfl:   •  anastrozole (ARIMIDEX) 1 MG tablet, Take 1 mg by mouth Daily., Disp: , Rfl:   •  atorvastatin (LIPITOR) 20 MG tablet, Take 1 tablet by mouth Every Night., Disp: 90 tablet, Rfl: 3  •  Calcium 600/Vitamin D 600-400 MG-UNIT chewable tablet, Chew 1 tablet 2 (Two) Times a Day., Disp: , Rfl:   •  cyanocobalamin 1000 MCG/ML injection, INJECT 1ML ONCE MONTHLY, Disp: , Rfl:   •  cyclobenzaprine (FLEXERIL) 10 MG tablet, Take 1 tablet by mouth 3 (Three) Times a Day As Needed for Muscle Spasms., Disp: 90 tablet, Rfl: 2  •  dexlansoprazole (DEXILANT) 60 MG capsule, Take 60 mg by mouth Every Night., Disp: , Rfl:   •  escitalopram (LEXAPRO) 10 MG tablet, Take 10 mg by mouth Daily., Disp: , Rfl:   •  fenofibrate (TRICOR) 145 MG tablet, Take 145 mg by mouth As Needed., Disp: , Rfl:   •  ferrous sulfate 325 (65 FE) MG tablet, Take 325 mg by mouth As Needed., Disp: , Rfl:   •  fluticasone (FLONASE) 50 MCG/ACT nasal spray, 1 spray by Each Nare route As Needed. As needed, Disp: , Rfl:   •  gabapentin (NEURONTIN) 100 MG capsule, Take 100 mg by mouth 3 (Three) Times a Day As Needed., Disp: , Rfl:   •  glipizide (GLUCOTROL XL) 2.5 MG 24 hr tablet, Take 2.5 mg by mouth Every Night., Disp: , Rfl:   •  [START ON 3/17/2023] HYDROcodone-acetaminophen (NORCO) 7.5-325 MG per tablet, Take 1 tablet by mouth Every 8 (Eight) Hours As Needed for Moderate Pain., Disp: 90 tablet, Rfl: 0  •  [START ON 2/18/2023] HYDROcodone-acetaminophen (NORCO) 7.5-325 MG per tablet, Take 1 tablet by mouth Every 8 (Eight) Hours As Needed for Moderate Pain., Disp: 90 tablet, Rfl: 0  •  [START ON 1/20/2023] HYDROcodone-acetaminophen (NORCO) 7.5-325 MG per tablet, Take 1 tablet by mouth Every 6 (Six) Hours As Needed for Moderate Pain., Disp: 90 tablet, Rfl: 0  •  Lancets (OneTouch Delica Plus [id number]) misc, 1 each Daily. Dx: E11.90, Disp: 100 each, Rfl: 2  •  levothyroxine (SYNTHROID, LEVOTHROID) 100 MCG tablet, TAKE  1 (ONE) TABLET DAILY WITH 200MCG TO MAKE 300 MCG, Disp: , Rfl:   •  lisinopril (PRINIVIL,ZESTRIL) 5 MG tablet, Take 5 mg by mouth Daily., Disp: , Rfl:   •  nebivolol (BYSTOLIC) 10 MG tablet, Take 1 tablet by mouth Daily., Disp: , Rfl:   •  OneTouch Verio test strip, 1 each by Other route Daily. Dx: E11.90 Use as instructed, Disp: 50 each, Rfl: 2  •  oxybutynin XL (DITROPAN-XL) 5 MG 24 hr tablet, Take 5 mg by mouth Daily., Disp: , Rfl:   •  polyethylene glycol (MIRALAX) 17 GM/SCOOP powder, Take 17 g by mouth Daily As Needed (Use if senna-docusate is ineffective)., Disp: 510 g, Rfl: 0  •  potassium chloride 10 MEQ CR tablet, Take 1 tablet by mouth 2 (Two) Times a Day., Disp: 15 tablet, Rfl: 0  •  promethazine (PHENERGAN) 25 MG tablet, Take 25 mg by mouth As Needed., Disp: , Rfl:   •  sennosides-docusate (PERICOLACE) 8.6-50 MG per tablet, Take 2 tablets by mouth 2 (Two) Times a Day As Needed for Constipation., Disp: 60 tablet, Rfl: 0  •  sucralfate (CARAFATE) 1 g tablet, Take 1 g by mouth Daily., Disp: , Rfl:   •  triamcinolone (KENALOG) 0.5 % cream, 1 (ONE) APPLICATION TO AFFECTED AREAS TWICE DAILY, Disp: , Rfl:     Review of Systems   Musculoskeletal: Positive for arthralgias and back pain.        Bilateral shoulder pain    Left arm swelling       Vitals:    01/09/23 0757   BP: (!) 187/114   Pulse: 89   Resp: 16   SpO2: 98%   PainSc:   6       Urine Drug Screen: 9/26/2022  Appropriate: Negative for hydrocodone    Objective   Physical Exam  Vitals and nursing note reviewed.   Constitutional:       General: She is not in acute distress.     Appearance: Normal appearance.   Musculoskeletal:      Comments: Left arm:  1.  Diffusely swollen to the level of the left shoulder.  2.  Left shoulder range of motion significantly decreased secondary to pain  3.  Left shoulder joint nontender to palpation.    Right arm:  1.  Nontender to palpation.  No swelling.  2.  Right shoulder tender to palpation across the supraspinatus  tendon  3.  Extremely decreased range of motion in all directions both passive and active secondary to pain     Lumbar spine exam:  1.  Facet loading equivocal bilaterally  2.  Facets weakly tender to palpation bilaterally  3.  Straight leg raise weakly positive bilaterally  4.  Lumbar range of motion decreased secondary to pain.   Neurological:      Mental Status: She is alert.      Sensory: No sensory deficit.      Motor: No weakness.           Assessment & Plan   Problems Addressed this Visit        Other    Osteoarthritis (Chronic)    Relevant Medications    HYDROcodone-acetaminophen (NORCO) 7.5-325 MG per tablet (Start on 3/17/2023)    HYDROcodone-acetaminophen (NORCO) 7.5-325 MG per tablet (Start on 2/18/2023)    HYDROcodone-acetaminophen (NORCO) 7.5-325 MG per tablet (Start on 1/20/2023)   Other Visit Diagnoses     Lumbar radiculopathy        Relevant Medications    HYDROcodone-acetaminophen (NORCO) 7.5-325 MG per tablet (Start on 3/17/2023)    HYDROcodone-acetaminophen (NORCO) 7.5-325 MG per tablet (Start on 2/18/2023)    HYDROcodone-acetaminophen (NORCO) 7.5-325 MG per tablet (Start on 1/20/2023)      Diagnoses       Codes Comments    Osteoarthritis of both shoulders, unspecified osteoarthritis type     ICD-10-CM: M19.011, M19.012  ICD-9-CM: 715.91     Lumbar radiculopathy     ICD-10-CM: M54.16  ICD-9-CM: 724.4           Plan:  1.  Still pending MRI  2.  Refill hydrocodone  3.  UDS negative for hydrocodone  4.  Inspect appropriate    --- Follow-up 3 months           INSPECT REPORT    As part of the patient's treatment plan, I may be prescribing controlled substances. The patient has been made aware of appropriate use of such medications, including potential risk of somnolence, limited ability to drive and/or work safely, and the potential for dependence or overdose. It has also bee made clear that these medications are for use by this patient only, without concomitant use of alcohol or other substances  unless prescribed.     Patient has completed prescribing agreement detailing terms of continued prescribing of controlled substances, including monitoring JACKIE reports, urine drug screening, and pill counts if necessary. The patient is aware that inappropriate use will results in cessation of prescribing such medications.    INSPECT report has been reviewed and scanned into the patient's chart.    As the clinician, I personally reviewed the INSPECT from 1/6/2023.    History and physical exam exhibit continued safe and appropriate use of controlled substances.      EMR Dragon/Transcription disclaimer:   Much of this encounter note is an electronic transcription/translation of spoken language to printed text. The electronic translation of spoken language may permit erroneous, or at times, nonsensical words or phrases to be inadvertently transcribed; Although I have reviewed the note for such errors, some may still exist.

## 2023-03-06 PROCEDURE — 93296 REM INTERROG EVL PM/IDS: CPT | Performed by: INTERNAL MEDICINE

## 2023-03-06 PROCEDURE — 93294 REM INTERROG EVL PM/LDLS PM: CPT | Performed by: INTERNAL MEDICINE

## 2023-04-05 ENCOUNTER — CLINICAL SUPPORT NO REQUIREMENTS (OUTPATIENT)
Dept: CARDIOLOGY | Facility: CLINIC | Age: 63
End: 2023-04-05
Payer: MEDICARE

## 2023-04-05 ENCOUNTER — OFFICE VISIT (OUTPATIENT)
Dept: CARDIOLOGY | Facility: CLINIC | Age: 63
End: 2023-04-05
Payer: MEDICARE

## 2023-04-05 VITALS
DIASTOLIC BLOOD PRESSURE: 88 MMHG | OXYGEN SATURATION: 99 % | HEART RATE: 77 BPM | SYSTOLIC BLOOD PRESSURE: 131 MMHG | HEIGHT: 64 IN | WEIGHT: 156 LBS | BODY MASS INDEX: 26.63 KG/M2

## 2023-04-05 DIAGNOSIS — Z95.0 PACEMAKER: ICD-10-CM

## 2023-04-05 DIAGNOSIS — E08.00 DIABETES MELLITUS DUE TO UNDERLYING CONDITION WITH HYPEROSMOLARITY WITHOUT COMA, UNSPECIFIED WHETHER LONG TERM INSULIN USE: Primary | Chronic | ICD-10-CM

## 2023-04-05 DIAGNOSIS — I10 PRIMARY HYPERTENSION: Chronic | ICD-10-CM

## 2023-04-05 DIAGNOSIS — I49.5 SICK SINUS SYNDROME: ICD-10-CM

## 2023-04-05 DIAGNOSIS — I49.5 SICK SINUS SYNDROME: Primary | ICD-10-CM

## 2023-04-05 PROCEDURE — 1159F MED LIST DOCD IN RCRD: CPT | Performed by: INTERNAL MEDICINE

## 2023-04-05 PROCEDURE — 93280 PM DEVICE PROGR EVAL DUAL: CPT | Performed by: INTERNAL MEDICINE

## 2023-04-05 PROCEDURE — 3075F SYST BP GE 130 - 139MM HG: CPT | Performed by: INTERNAL MEDICINE

## 2023-04-05 PROCEDURE — 99213 OFFICE O/P EST LOW 20 MIN: CPT | Performed by: INTERNAL MEDICINE

## 2023-04-05 PROCEDURE — 1160F RVW MEDS BY RX/DR IN RCRD: CPT | Performed by: INTERNAL MEDICINE

## 2023-04-05 PROCEDURE — 3079F DIAST BP 80-89 MM HG: CPT | Performed by: INTERNAL MEDICINE

## 2023-04-05 NOTE — PROGRESS NOTES
Progress note      Name: Shantel Maharaj ADMIT: (Not on file)   : 1960  PCP: Gris Dominguez MD    MRN: [id number] LOS: 0 days   AGE/SEX: 63 y.o. female  ROOM: Room/bed info not found     Chief Complaint   Patient presents with   • sick sinus syndrome        Subjective       History of present illness  Shantel Maharaj is a 63-year-old female patient's who has hypertension, dyslipidemia, diabetes, history of breast cancer with bilateral mastectomy, chronically swollen left arm due to lymphedema, sick sinus syndrome status post dual-chamber pacemaker implantation on 2022 right-sided, here today for follow-up.  Patient has been doing well, denies any chest pain or shortness of breath, no palpitations no lower extremity edema no syncopal episodes.    Past Medical History:   Diagnosis Date   • Arm pain    • Breast cancer    • Cancer     lt breast -- reocc rt breast 2020   • Chronic pain disorder    • COVID-19    • Diabetes    • Drug therapy    • High cholesterol    • Hx of radiation therapy    • Hypertension    • Joint pain    • Low back pain    • Neuropathy in diabetes    • Osteoarthritis    • Osteopenia    • Thyroid disease      Past Surgical History:   Procedure Laterality Date   • APPENDECTOMY     • BREAST LUMPECTOMY      2021-- [facility]-- cancer-- reocc   • BREAST SURGERY      removal of left breast    • CARDIAC ELECTROPHYSIOLOGY PROCEDURE N/A 2022    Procedure: Pacemaker DC new-[place];  Surgeon: Ramsey Dinero MD;  Location: [facility] INVASIVE LOCATION;  Service: Cardiology;  Laterality: N/A;   •  SECTION      x2   • GALLBLADDER SURGERY     • INSERT / REPLACE / REMOVE PACEMAKER     • KNEE SURGERY      left knee - petella broken    • OTHER SURGICAL HISTORY      rt arm surgery with shlomo  placement rt arm   • SHOULDER SURGERY      broken-lt- in 12 places   • WRIST SURGERY      rt - broken     Family History   Problem Relation Age of Onset   • Cancer Mother    • Breast cancer Mother     • Heart disease Father         Cayey   • Hypertension Father    • Breast cancer Maternal Aunt    • Breast cancer Maternal Aunt    • Breast cancer Maternal Aunt      Social History     Tobacco Use   • Smoking status: Former     Packs/day: 1.00     Types: Cigarettes     Passive exposure: Past   • Smokeless tobacco: Never   • Tobacco comments:     quit  smoking in 2008   Vaping Use   • Vaping Use: Never used   Substance Use Topics   • Alcohol use: Never   • Drug use: Never     (Not in a hospital admission)    Allergies:  Valsartan      Physical Exam  VITALS REVIEWED    General:      well developed, in no acute distress.    Head:      normocephalic and atraumatic.    Eyes:      PERRL/EOM intact, conjunctiva and sclera clear with out nystagmus.    Neck:      no masses, thyromegaly,  trachea central with normal respiratory effort and PMI displaced laterally  Lungs:      Clear to auscultation bilaterally  Heart:       Regular rate and rhythm  Msk:      no deformity or scoliosis noted of thoracic or lumbar spine.    Pulses:      pulses normal in all 4 extremities.    Extremities:       No lower extremity edema  Neurologic:      no focal deficits.   alert oriented x3  Skin:      intact without lesions or rashes.    Psych:      alert and cooperative; normal mood and affect; normal attention span and concentration.      Result Review :               Pertinent cardiac workup    1. Echo 2/22/2022 ejection fraction 60 to 65%  2. Stress test 10/4/2022 low risk      Procedures        Assessment and Plan      Shantel Maharaj is a 63-year-old female patient with sick sinus syndrome, dual-chamber pacemaker here for follow-up.  She is doing well no anginal symptoms no CHF symptoms, device interrogation shows appropriate function, no arrhythmias.  Her blood pressure is also well controlled now.  For now continue same therapy, we will see her in follow-up in 6 months.    Diagnoses and all orders for this visit:    1. Diabetes  mellitus due to underlying condition with hyperosmolarity without coma, unspecified whether long term insulin use (Primary)    2. Primary hypertension    3. Pacemaker    4. Sick sinus syndrome  Overview:  Added automatically from request for surgery [id number]             Return in about 6 months (around 10/5/2023).  Patient was given instructions and counseling regarding her condition or for health maintenance advice. Please see specific information pulled into the AVS if appropriate.

## 2023-04-10 ENCOUNTER — OFFICE VISIT (OUTPATIENT)
Dept: PAIN MEDICINE | Facility: CLINIC | Age: 63
End: 2023-04-10
Payer: MEDICARE

## 2023-04-10 VITALS
DIASTOLIC BLOOD PRESSURE: 90 MMHG | SYSTOLIC BLOOD PRESSURE: 172 MMHG | HEART RATE: 68 BPM | OXYGEN SATURATION: 99 % | RESPIRATION RATE: 16 BRPM

## 2023-04-10 DIAGNOSIS — M19.012 OSTEOARTHRITIS OF BOTH SHOULDERS, UNSPECIFIED OSTEOARTHRITIS TYPE: ICD-10-CM

## 2023-04-10 DIAGNOSIS — M19.011 OSTEOARTHRITIS OF BOTH SHOULDERS, UNSPECIFIED OSTEOARTHRITIS TYPE: ICD-10-CM

## 2023-04-10 DIAGNOSIS — M54.16 LUMBAR RADICULOPATHY: ICD-10-CM

## 2023-04-10 PROCEDURE — 3080F DIAST BP >= 90 MM HG: CPT | Performed by: STUDENT IN AN ORGANIZED HEALTH CARE EDUCATION/TRAINING PROGRAM

## 2023-04-10 PROCEDURE — 1125F AMNT PAIN NOTED PAIN PRSNT: CPT | Performed by: STUDENT IN AN ORGANIZED HEALTH CARE EDUCATION/TRAINING PROGRAM

## 2023-04-10 PROCEDURE — 99214 OFFICE O/P EST MOD 30 MIN: CPT | Performed by: STUDENT IN AN ORGANIZED HEALTH CARE EDUCATION/TRAINING PROGRAM

## 2023-04-10 PROCEDURE — 3077F SYST BP >= 140 MM HG: CPT | Performed by: STUDENT IN AN ORGANIZED HEALTH CARE EDUCATION/TRAINING PROGRAM

## 2023-04-10 RX ORDER — HYDROCODONE BITARTRATE AND ACETAMINOPHEN 7.5; 325 MG/1; MG/1
1 TABLET ORAL EVERY 6 HOURS PRN
Qty: 90 TABLET | Refills: 0 | Status: SHIPPED | OUTPATIENT
Start: 2023-04-21

## 2023-04-10 RX ORDER — CYCLOBENZAPRINE HCL 10 MG
10 TABLET ORAL 3 TIMES DAILY PRN
Qty: 90 TABLET | Refills: 2 | Status: SHIPPED | OUTPATIENT
Start: 2023-04-10

## 2023-04-10 RX ORDER — HYDROCODONE BITARTRATE AND ACETAMINOPHEN 7.5; 325 MG/1; MG/1
1 TABLET ORAL EVERY 8 HOURS PRN
Qty: 90 TABLET | Refills: 0 | Status: SHIPPED | OUTPATIENT
Start: 2023-05-20

## 2023-04-10 RX ORDER — HYDROCODONE BITARTRATE AND ACETAMINOPHEN 7.5; 325 MG/1; MG/1
1 TABLET ORAL EVERY 8 HOURS PRN
Qty: 90 TABLET | Refills: 0 | Status: SHIPPED | OUTPATIENT
Start: 2023-06-19

## 2023-04-10 NOTE — PROGRESS NOTES
CHIEF COMPLAINT  Chief Complaint   Patient presents with   • Osteoarthritis     3 month f/u  CC Bilateral shoulder pain and leg  Treated w/Hydrocodone 04/10 @ 7 am        Primary Care  Gris Dominguez MD    Subjective   Shantel Maharaj is a 63 y.o. female  who presents for follow-up.She states she has had longstanding low back pain and also developed bilateral shoulder and left arm pain following a mastectomy and lymph node stripping.  She was previously being evaluated by an outside pain clinic who is providing her narcotic pain medication as well as injections, but she does not know the type of injection she was receiving.  She states that additionally, she is required to attend chiropractic sessions which she thought were exacerbating her pain.  Given this, she wanted to transition care to [facility].    Osteoarthritis  Her past medical history is significant for osteoarthritis.   Back Pain  Associated symptoms include leg pain.   Arm Pain     Knee Pain     Leg Pain          Location: Low back without radiation, bilateral shoulders, left arm Onset: Many years ago  Duration: Constant, slowly progressing  Timing: Constant throughout the day  Quality: The pain in the shoulder is a sharp, stabbing pain.  The low back as an aching, cramping pain  Severity: Today: 6       Last Week: 6       Worst: 8  Modifying Factors: The pain is worse with walking and physical activity.  The pain is slightly improved with lying flat as well as medication and compression on the left arm     Physical Therapy: yes    Interval Update 04/10/2023: Continues with some benefit with her hydrocodone 7.5 mg 3 times daily.  Denies constipation or sedation.  She reports that she was unable to do her recent MRI due to scheduling conflict.    The following portions of the patient's history were reviewed and updated as appropriate: allergies, current medications, past family history, past medical history, past social history, past surgical  history and problem list.      Current Outpatient Medications:   •  amLODIPine (NORVASC) 10 MG tablet, Take 1 tablet by mouth Daily., Disp: , Rfl:   •  anastrozole (ARIMIDEX) 1 MG tablet, Take 1 tablet by mouth Daily., Disp: , Rfl:   •  atorvastatin (LIPITOR) 20 MG tablet, Take 1 tablet by mouth Every Night., Disp: 90 tablet, Rfl: 3  •  Calcium 600/Vitamin D 600-400 MG-UNIT chewable tablet, Chew 1 tablet 2 (Two) Times a Day., Disp: , Rfl:   •  cyanocobalamin 1000 MCG/ML injection, INJECT 1ML ONCE MONTHLY, Disp: , Rfl:   •  cyclobenzaprine (FLEXERIL) 10 MG tablet, Take 1 tablet by mouth 3 (Three) Times a Day As Needed for Muscle Spasms., Disp: 90 tablet, Rfl: 2  •  dexlansoprazole (DEXILANT) 60 MG capsule, Take 1 capsule by mouth Every Night., Disp: , Rfl:   •  escitalopram (LEXAPRO) 10 MG tablet, Take 1 tablet by mouth Daily., Disp: , Rfl:   •  fenofibrate (TRICOR) 145 MG tablet, Take 1 tablet by mouth As Needed., Disp: , Rfl:   •  ferrous sulfate 325 (65 FE) MG tablet, Take 1 tablet by mouth As Needed., Disp: , Rfl:   •  fluticasone (FLONASE) 50 MCG/ACT nasal spray, 1 spray by Each Nare route As Needed. As needed, Disp: , Rfl:   •  gabapentin (NEURONTIN) 100 MG capsule, Take 1 capsule by mouth 3 (Three) Times a Day As Needed., Disp: , Rfl:   •  glipizide (GLUCOTROL XL) 2.5 MG 24 hr tablet, Take 1 tablet by mouth Every Night., Disp: , Rfl:   •  [START ON 6/19/2023] HYDROcodone-acetaminophen (NORCO) 7.5-325 MG per tablet, Take 1 tablet by mouth Every 8 (Eight) Hours As Needed for Moderate Pain., Disp: 90 tablet, Rfl: 0  •  [START ON 5/20/2023] HYDROcodone-acetaminophen (NORCO) 7.5-325 MG per tablet, Take 1 tablet by mouth Every 8 (Eight) Hours As Needed for Moderate Pain., Disp: 90 tablet, Rfl: 0  •  [START ON 4/21/2023] HYDROcodone-acetaminophen (NORCO) 7.5-325 MG per tablet, Take 1 tablet by mouth Every 6 (Six) Hours As Needed for Moderate Pain., Disp: 90 tablet, Rfl: 0  •  Lancets (OneTouch Delica Plus [id number])  misc, 1 each Daily. Dx: E11.90, Disp: 100 each, Rfl: 2  •  levothyroxine (SYNTHROID, LEVOTHROID) 100 MCG tablet, TAKE 1 (ONE) TABLET DAILY WITH 200MCG TO MAKE 300 MCG, Disp: , Rfl:   •  lisinopril (PRINIVIL,ZESTRIL) 5 MG tablet, Take 1 tablet by mouth Daily., Disp: , Rfl:   •  nebivolol (BYSTOLIC) 10 MG tablet, Take 1 tablet by mouth Daily., Disp: , Rfl:   •  OneTouch Verio test strip, 1 each by Other route Daily. Dx: E11.90 Use as instructed, Disp: 50 each, Rfl: 2  •  oxybutynin XL (DITROPAN-XL) 5 MG 24 hr tablet, Take 1 tablet by mouth Daily., Disp: , Rfl:   •  polyethylene glycol (MIRALAX) 17 GM/SCOOP powder, Take 17 g by mouth Daily As Needed (Use if senna-docusate is ineffective)., Disp: 510 g, Rfl: 0  •  potassium chloride 10 MEQ CR tablet, Take 1 tablet by mouth 2 (Two) Times a Day., Disp: 15 tablet, Rfl: 0  •  promethazine (PHENERGAN) 25 MG tablet, Take 1 tablet by mouth As Needed., Disp: , Rfl:   •  sennosides-docusate (PERICOLACE) 8.6-50 MG per tablet, Take 2 tablets by mouth 2 (Two) Times a Day As Needed for Constipation., Disp: 60 tablet, Rfl: 0  •  sucralfate (CARAFATE) 1 g tablet, Take 1 tablet by mouth Daily., Disp: , Rfl:   •  triamcinolone (KENALOG) 0.5 % cream, 1 (ONE) APPLICATION TO AFFECTED AREAS TWICE DAILY, Disp: , Rfl:     Review of Systems   Musculoskeletal: Positive for arthralgias and back pain.        Bilateral shoulder pain    Left arm swelling       Vitals:    04/10/23 0805   BP: 172/90   Pulse: 68   Resp: 16   SpO2: 99%   PainSc:   7       Urine Drug Screen: 9/26/2022  Appropriate: Negative for hydrocodone    Objective   Physical Exam  Vitals and nursing note reviewed.   Constitutional:       General: She is not in acute distress.     Appearance: Normal appearance.   Musculoskeletal:      Comments: Left arm:  1.  Diffusely swollen to the level of the left shoulder.  2.  Left shoulder range of motion significantly decreased secondary to pain  3.  Left shoulder joint nontender to  palpation.    Right arm:  1.  Nontender to palpation.  No swelling.  2.  Right shoulder tender to palpation across the supraspinatus tendon  3.  Extremely decreased range of motion in all directions both passive and active secondary to pain     Lumbar spine exam:  1.  Facet loading equivocal bilaterally  2.  Facets weakly tender to palpation bilaterally  3.  Straight leg raise weakly positive bilaterally  4.  Lumbar range of motion decreased secondary to pain.   Neurological:      Mental Status: She is alert.      Sensory: No sensory deficit.      Motor: No weakness.           Assessment & Plan   Problems Addressed this Visit        Other    Osteoarthritis (Chronic)    Relevant Medications    HYDROcodone-acetaminophen (NORCO) 7.5-325 MG per tablet (Start on 6/19/2023)    HYDROcodone-acetaminophen (NORCO) 7.5-325 MG per tablet (Start on 5/20/2023)    HYDROcodone-acetaminophen (NORCO) 7.5-325 MG per tablet (Start on 4/21/2023)   Other Visit Diagnoses     Lumbar radiculopathy        Relevant Medications    HYDROcodone-acetaminophen (NORCO) 7.5-325 MG per tablet (Start on 6/19/2023)    HYDROcodone-acetaminophen (NORCO) 7.5-325 MG per tablet (Start on 5/20/2023)    HYDROcodone-acetaminophen (NORCO) 7.5-325 MG per tablet (Start on 4/21/2023)      Diagnoses       Codes Comments    Osteoarthritis of both shoulders, unspecified osteoarthritis type     ICD-10-CM: M19.011, M19.012  ICD-9-CM: 715.91     Lumbar radiculopathy     ICD-10-CM: M54.16  ICD-9-CM: 724.4           Plan:  1.  I encouraged her to follow-up regarding her MRI as she is continue to complain of worsening pain  2.  Refill hydrocodone  3.  UDS negative for hydrocodone  4.  Inspect appropriate    --- Follow-up 3 months           INSPECT REPORT    As part of the patient's treatment plan, I may be prescribing controlled substances. The patient has been made aware of appropriate use of such medications, including potential risk of somnolence, limited ability to  drive and/or work safely, and the potential for dependence or overdose. It has also bee made clear that these medications are for use by this patient only, without concomitant use of alcohol or other substances unless prescribed.     Patient has completed prescribing agreement detailing terms of continued prescribing of controlled substances, including monitoring JACKIE reports, urine drug screening, and pill counts if necessary. The patient is aware that inappropriate use will results in cessation of prescribing such medications.    INSPECT report has been reviewed and scanned into the patient's chart.    As the clinician, I personally reviewed the INSPECT from 4/7/2023.    History and physical exam exhibit continued safe and appropriate use of controlled substances.      EMR Dragon/Transcription disclaimer:   Much of this encounter note is an electronic transcription/translation of spoken language to printed text. The electronic translation of spoken language may permit erroneous, or at times, nonsensical words or phrases to be inadvertently transcribed; Although I have reviewed the note for such errors, some may still exist.

## 2023-05-24 ENCOUNTER — TELEPHONE (OUTPATIENT)
Dept: PAIN MEDICINE | Facility: CLINIC | Age: 63
End: 2023-05-24

## 2023-05-24 NOTE — TELEPHONE ENCOUNTER
Caller: PATIENT    Relationship to patient: SELF     Best call back number: [phone]    Patient is needing: PATIENT WAS CALLING TO DISCUSS HER PHARMACY, [facility] IN [place], [place] BEING OUT OF HER MEDICATION. PATIENT STATED SHE DOES NOT KNOW THE NAME OF HER MEDICATION BUT REFERS TO IT AS HER PAIN MEDICATION. [facility] IS OUT OF HER MEDICATION AND SHE WAS ADVISED TO CONTACT YOUR OFFICE TO DISCUSS THIS. I ATTEMPTED TO WARM TRANSFER CALL TO THE CLINICAL LINE BUT RECEIVED NO ANSWER. THANK YOU!

## 2023-05-30 ENCOUNTER — TELEPHONE (OUTPATIENT)
Dept: PAIN MEDICINE | Facility: CLINIC | Age: 63
End: 2023-05-30

## 2023-05-30 DIAGNOSIS — M54.16 LUMBAR RADICULOPATHY: ICD-10-CM

## 2023-05-30 DIAGNOSIS — M19.011 OSTEOARTHRITIS OF BOTH SHOULDERS, UNSPECIFIED OSTEOARTHRITIS TYPE: ICD-10-CM

## 2023-05-30 DIAGNOSIS — M19.012 OSTEOARTHRITIS OF BOTH SHOULDERS, UNSPECIFIED OSTEOARTHRITIS TYPE: ICD-10-CM

## 2023-05-30 NOTE — TELEPHONE ENCOUNTER
Caller: Shantel Maharaj    Relationship to patient: Self    Best call back number: [phone]  Patient is needing: [facility]

## 2023-05-31 RX ORDER — HYDROCODONE BITARTRATE AND ACETAMINOPHEN 7.5; 325 MG/1; MG/1
1 TABLET ORAL EVERY 8 HOURS PRN
Qty: 90 TABLET | Refills: 0 | Status: SHIPPED | OUTPATIENT
Start: 2023-05-31

## 2023-08-25 ENCOUNTER — OFFICE (AMBULATORY)
Dept: URBAN - METROPOLITAN AREA CLINIC 64 | Facility: CLINIC | Age: 63
End: 2023-08-25
Payer: MEDICAID

## 2023-08-25 VITALS
WEIGHT: 147 LBS | SYSTOLIC BLOOD PRESSURE: 171 MMHG | DIASTOLIC BLOOD PRESSURE: 108 MMHG | HEART RATE: 73 BPM | HEIGHT: 64 IN

## 2023-08-25 DIAGNOSIS — Z86.010 PERSONAL HISTORY OF COLONIC POLYPS: ICD-10-CM

## 2023-08-25 DIAGNOSIS — K62.5 HEMORRHAGE OF ANUS AND RECTUM: ICD-10-CM

## 2023-08-25 DIAGNOSIS — R63.4 ABNORMAL WEIGHT LOSS: ICD-10-CM

## 2023-08-25 DIAGNOSIS — R13.10 DYSPHAGIA, UNSPECIFIED: ICD-10-CM

## 2023-08-25 DIAGNOSIS — R16.0 HEPATOMEGALY, NOT ELSEWHERE CLASSIFIED: ICD-10-CM

## 2023-08-25 DIAGNOSIS — K59.00 CONSTIPATION, UNSPECIFIED: ICD-10-CM

## 2023-08-25 DIAGNOSIS — K21.9 GASTRO-ESOPHAGEAL REFLUX DISEASE WITHOUT ESOPHAGITIS: ICD-10-CM

## 2023-08-25 DIAGNOSIS — R10.30 LOWER ABDOMINAL PAIN, UNSPECIFIED: ICD-10-CM

## 2023-08-25 PROCEDURE — 99214 OFFICE O/P EST MOD 30 MIN: CPT | Performed by: NURSE PRACTITIONER

## 2023-08-31 RX ORDER — ATORVASTATIN CALCIUM 20 MG/1
TABLET, FILM COATED ORAL
Qty: 90 TABLET | Refills: 3 | Status: SHIPPED | OUTPATIENT
Start: 2023-08-31

## 2023-08-31 NOTE — TELEPHONE ENCOUNTER
Rx Refill Note  Requested Prescriptions     Pending Prescriptions Disp Refills    atorvastatin (LIPITOR) 20 MG tablet [Pharmacy Med Name: ATORVASTATIN 20 MG TABLET] 90 tablet 3     Sig: TAKE 1 TABLET BY MOUTH EVERY DAY AT NIGHT      Last office visit with prescribing clinician: 4/5/2023   Last telemedicine visit with prescribing clinician: Visit date not found   Next office visit with prescribing clinician: 10/4/2023                           Bonita Ross MA  08/31/23, 09:39 EDT

## 2023-09-04 PROCEDURE — 93296 REM INTERROG EVL PM/IDS: CPT | Performed by: INTERNAL MEDICINE

## 2023-09-04 PROCEDURE — 93294 REM INTERROG EVL PM/LDLS PM: CPT | Performed by: INTERNAL MEDICINE

## 2023-10-06 ENCOUNTER — OFFICE (AMBULATORY)
Dept: URBAN - METROPOLITAN AREA PATHOLOGY 4 | Facility: PATHOLOGY | Age: 63
End: 2023-10-06
Payer: MEDICAID

## 2023-10-06 ENCOUNTER — ON CAMPUS - OUTPATIENT (AMBULATORY)
Dept: URBAN - METROPOLITAN AREA HOSPITAL 2 | Facility: HOSPITAL | Age: 63
End: 2023-10-06
Payer: MEDICAID

## 2023-10-06 VITALS
HEART RATE: 63 BPM | RESPIRATION RATE: 16 BRPM | DIASTOLIC BLOOD PRESSURE: 55 MMHG | HEART RATE: 68 BPM | SYSTOLIC BLOOD PRESSURE: 100 MMHG | OXYGEN SATURATION: 99 % | RESPIRATION RATE: 18 BRPM | HEIGHT: 64 IN | SYSTOLIC BLOOD PRESSURE: 108 MMHG | DIASTOLIC BLOOD PRESSURE: 62 MMHG | SYSTOLIC BLOOD PRESSURE: 168 MMHG | DIASTOLIC BLOOD PRESSURE: 71 MMHG | HEART RATE: 79 BPM | RESPIRATION RATE: 20 BRPM | DIASTOLIC BLOOD PRESSURE: 57 MMHG | SYSTOLIC BLOOD PRESSURE: 123 MMHG | TEMPERATURE: 97.3 F | HEART RATE: 60 BPM | SYSTOLIC BLOOD PRESSURE: 102 MMHG | DIASTOLIC BLOOD PRESSURE: 56 MMHG | RESPIRATION RATE: 15 BRPM | HEART RATE: 64 BPM | SYSTOLIC BLOOD PRESSURE: 110 MMHG | WEIGHT: 142 LBS | HEART RATE: 69 BPM | DIASTOLIC BLOOD PRESSURE: 69 MMHG | SYSTOLIC BLOOD PRESSURE: 115 MMHG | DIASTOLIC BLOOD PRESSURE: 58 MMHG | SYSTOLIC BLOOD PRESSURE: 98 MMHG | DIASTOLIC BLOOD PRESSURE: 103 MMHG | SYSTOLIC BLOOD PRESSURE: 120 MMHG | OXYGEN SATURATION: 100 % | RESPIRATION RATE: 17 BRPM

## 2023-10-06 DIAGNOSIS — D12.2 BENIGN NEOPLASM OF ASCENDING COLON: ICD-10-CM

## 2023-10-06 DIAGNOSIS — K57.30 DIVERTICULOSIS OF LARGE INTESTINE WITHOUT PERFORATION OR ABS: ICD-10-CM

## 2023-10-06 DIAGNOSIS — K44.9 DIAPHRAGMATIC HERNIA WITHOUT OBSTRUCTION OR GANGRENE: ICD-10-CM

## 2023-10-06 DIAGNOSIS — Z86.010 PERSONAL HISTORY OF COLONIC POLYPS: ICD-10-CM

## 2023-10-06 DIAGNOSIS — K62.5 HEMORRHAGE OF ANUS AND RECTUM: ICD-10-CM

## 2023-10-06 DIAGNOSIS — K64.1 SECOND DEGREE HEMORRHOIDS: ICD-10-CM

## 2023-10-06 DIAGNOSIS — D12.3 BENIGN NEOPLASM OF TRANSVERSE COLON: ICD-10-CM

## 2023-10-06 DIAGNOSIS — K22.2 ESOPHAGEAL OBSTRUCTION: ICD-10-CM

## 2023-10-06 DIAGNOSIS — R13.10 DYSPHAGIA, UNSPECIFIED: ICD-10-CM

## 2023-10-06 PROBLEM — K63.5 POLYP OF COLON: Status: ACTIVE | Noted: 2023-10-06

## 2023-10-06 PROCEDURE — 88305 TISSUE EXAM BY PATHOLOGIST: CPT | Mod: 26 | Performed by: INTERNAL MEDICINE

## 2023-10-06 PROCEDURE — 43235 EGD DIAGNOSTIC BRUSH WASH: CPT | Performed by: INTERNAL MEDICINE

## 2023-10-06 PROCEDURE — 43450 DILATE ESOPHAGUS 1/MULT PASS: CPT | Performed by: INTERNAL MEDICINE

## 2023-10-06 PROCEDURE — 45385 COLONOSCOPY W/LESION REMOVAL: CPT | Performed by: INTERNAL MEDICINE

## 2023-10-16 ENCOUNTER — OFFICE VISIT (OUTPATIENT)
Dept: PAIN MEDICINE | Facility: CLINIC | Age: 63
End: 2023-10-16
Payer: MEDICARE

## 2023-10-16 VITALS
SYSTOLIC BLOOD PRESSURE: 144 MMHG | OXYGEN SATURATION: 97 % | DIASTOLIC BLOOD PRESSURE: 87 MMHG | HEART RATE: 80 BPM | RESPIRATION RATE: 16 BRPM

## 2023-10-16 DIAGNOSIS — M54.16 LUMBAR RADICULOPATHY: ICD-10-CM

## 2023-10-16 DIAGNOSIS — M19.011 OSTEOARTHRITIS OF BOTH SHOULDERS, UNSPECIFIED OSTEOARTHRITIS TYPE: ICD-10-CM

## 2023-10-16 DIAGNOSIS — M19.012 OSTEOARTHRITIS OF BOTH SHOULDERS, UNSPECIFIED OSTEOARTHRITIS TYPE: ICD-10-CM

## 2023-10-16 PROCEDURE — 1160F RVW MEDS BY RX/DR IN RCRD: CPT | Performed by: STUDENT IN AN ORGANIZED HEALTH CARE EDUCATION/TRAINING PROGRAM

## 2023-10-16 PROCEDURE — 1125F AMNT PAIN NOTED PAIN PRSNT: CPT | Performed by: STUDENT IN AN ORGANIZED HEALTH CARE EDUCATION/TRAINING PROGRAM

## 2023-10-16 PROCEDURE — 3077F SYST BP >= 140 MM HG: CPT | Performed by: STUDENT IN AN ORGANIZED HEALTH CARE EDUCATION/TRAINING PROGRAM

## 2023-10-16 PROCEDURE — 1159F MED LIST DOCD IN RCRD: CPT | Performed by: STUDENT IN AN ORGANIZED HEALTH CARE EDUCATION/TRAINING PROGRAM

## 2023-10-16 PROCEDURE — 3079F DIAST BP 80-89 MM HG: CPT | Performed by: STUDENT IN AN ORGANIZED HEALTH CARE EDUCATION/TRAINING PROGRAM

## 2023-10-16 PROCEDURE — 99214 OFFICE O/P EST MOD 30 MIN: CPT | Performed by: STUDENT IN AN ORGANIZED HEALTH CARE EDUCATION/TRAINING PROGRAM

## 2023-10-16 RX ORDER — HYDROCODONE BITARTRATE AND ACETAMINOPHEN 7.5; 325 MG/1; MG/1
1 TABLET ORAL EVERY 8 HOURS PRN
Qty: 90 TABLET | Refills: 0 | Status: SHIPPED | OUTPATIENT
Start: 2024-01-04

## 2023-10-16 RX ORDER — ORAL SEMAGLUTIDE 7 MG/1
1 TABLET ORAL DAILY
COMMUNITY
Start: 2023-09-14

## 2023-10-16 RX ORDER — HYDROCODONE BITARTRATE AND ACETAMINOPHEN 7.5; 325 MG/1; MG/1
1 TABLET ORAL EVERY 8 HOURS PRN
Qty: 90 TABLET | Refills: 0 | Status: SHIPPED | OUTPATIENT
Start: 2023-11-06

## 2023-10-16 RX ORDER — HYDROCODONE BITARTRATE AND ACETAMINOPHEN 7.5; 325 MG/1; MG/1
1 TABLET ORAL EVERY 8 HOURS PRN
Qty: 90 TABLET | Refills: 0 | Status: SHIPPED | OUTPATIENT
Start: 2023-12-05

## 2023-10-16 NOTE — PROGRESS NOTES
CHIEF COMPLAINT  Chief Complaint   Patient presents with    Back Pain     3 month f/u  CC   low back pain and also developed bilateral shoulder and left arm pain following a mastectomy and lymph node stripping.  Treated w/Hydrocodone LD @ 3 am 10/16       Primary Care  Gris Dominguez MD    Subjective   Shantel Maharaj is a 63 y.o. female  who presents for follow-up.She states she has had longstanding low back pain and also developed bilateral shoulder and left arm pain following a mastectomy and lymph node stripping.  She was previously being evaluated by an outside pain clinic who is providing her narcotic pain medication as well as injections, but she does not know the type of injection she was receiving.  She states that additionally, she is required to attend chiropractic sessions which she thought were exacerbating her pain.  Given this, she wanted to transition care to [facility].    Osteoarthritis  Her past medical history is significant for osteoarthritis.   Back Pain  Associated symptoms include leg pain.   Arm Pain     Knee Pain     Leg Pain          Location: Low back without radiation, bilateral shoulders, left arm Onset: Many years ago  Duration: Constant, slowly progressing  Timing: Constant throughout the day  Quality: The pain in the shoulder is a sharp, stabbing pain.  The low back as an aching, cramping pain  Severity: Today: 6       Last Week: 6       Worst: 8  Modifying Factors: The pain is worse with walking and physical activity.  The pain is slightly improved with lying flat as well as medication and compression on the left arm     Physical Therapy: yes    Interval Update 10/16/2023: She reports doing somewhat better today.  Continues to get benefit with her hydrocodone 7.5 mg 3 times daily.  She was recently evaluated by her oncologist who reports that she is currently in remission.  Otherwise, unchanged    The following portions of the patient's history were reviewed and updated as  appropriate: allergies, current medications, past family history, past medical history, past social history, past surgical history and problem list.      Current Outpatient Medications:     amLODIPine (NORVASC) 10 MG tablet, Take 1 tablet by mouth Daily., Disp: , Rfl:     anastrozole (ARIMIDEX) 1 MG tablet, Take 1 tablet by mouth Daily., Disp: , Rfl:     atorvastatin (LIPITOR) 20 MG tablet, TAKE 1 TABLET BY MOUTH EVERY DAY AT NIGHT, Disp: 90 tablet, Rfl: 3    Calcium 600/Vitamin D 600-400 MG-UNIT chewable tablet, Chew 1 tablet 2 (Two) Times a Day., Disp: , Rfl:     cyanocobalamin 1000 MCG/ML injection, INJECT 1ML ONCE MONTHLY, Disp: , Rfl:     cyclobenzaprine (FLEXERIL) 10 MG tablet, Take 1 tablet by mouth 3 (Three) Times a Day As Needed for Muscle Spasms., Disp: 90 tablet, Rfl: 2    dexlansoprazole (DEXILANT) 60 MG capsule, Take 1 capsule by mouth Every Night., Disp: , Rfl:     escitalopram (LEXAPRO) 10 MG tablet, Take 1 tablet by mouth Daily., Disp: , Rfl:     fenofibrate (TRICOR) 145 MG tablet, Take 1 tablet by mouth As Needed., Disp: , Rfl:     ferrous sulfate 325 (65 FE) MG tablet, Take 1 tablet by mouth As Needed., Disp: , Rfl:     fluticasone (FLONASE) 50 MCG/ACT nasal spray, 1 spray by Each Nare route As Needed. As needed, Disp: , Rfl:     gabapentin (NEURONTIN) 100 MG capsule, Take 1 capsule by mouth 3 (Three) Times a Day As Needed., Disp: , Rfl:     glipizide (GLUCOTROL XL) 2.5 MG 24 hr tablet, Take 1 tablet by mouth Every Night., Disp: , Rfl:     [START ON 1/4/2024] HYDROcodone-acetaminophen (NORCO) 7.5-325 MG per tablet, Take 1 tablet by mouth Every 8 (Eight) Hours As Needed for Moderate Pain., Disp: 90 tablet, Rfl: 0    [START ON 12/5/2023] HYDROcodone-acetaminophen (NORCO) 7.5-325 MG per tablet, Take 1 tablet by mouth Every 8 (Eight) Hours As Needed for Moderate Pain., Disp: 90 tablet, Rfl: 0    [START ON 11/6/2023] HYDROcodone-acetaminophen (NORCO) 7.5-325 MG per tablet, Take 1 tablet by mouth Every 8  (Eight) Hours As Needed for Moderate Pain., Disp: 90 tablet, Rfl: 0    Lancets (OneTouch Delica Plus [id number]) misc, 1 each Daily. Dx: E11.90, Disp: 100 each, Rfl: 2    levothyroxine (SYNTHROID, LEVOTHROID) 100 MCG tablet, TAKE 1 (ONE) TABLET DAILY WITH 200MCG TO MAKE 300 MCG, Disp: , Rfl:     lisinopril (PRINIVIL,ZESTRIL) 5 MG tablet, Take 1 tablet by mouth Daily., Disp: , Rfl:     nebivolol (BYSTOLIC) 10 MG tablet, Take 1 tablet by mouth Daily., Disp: , Rfl:     OneTouch Verio test strip, 1 each by Other route Daily. Dx: E11.90 Use as instructed, Disp: 50 each, Rfl: 2    oxybutynin XL (DITROPAN-XL) 5 MG 24 hr tablet, Take 1 tablet by mouth Daily., Disp: , Rfl:     polyethylene glycol (MIRALAX) 17 GM/SCOOP powder, Take 17 g by mouth Daily As Needed (Use if senna-docusate is ineffective)., Disp: 510 g, Rfl: 0    potassium chloride 10 MEQ CR tablet, Take 1 tablet by mouth 2 (Two) Times a Day., Disp: 15 tablet, Rfl: 0    promethazine (PHENERGAN) 25 MG tablet, Take 1 tablet by mouth As Needed., Disp: , Rfl:     Rybelsus 7 MG tablet, Take 7 mg by mouth Daily., Disp: , Rfl:     sennosides-docusate (PERICOLACE) 8.6-50 MG per tablet, Take 2 tablets by mouth 2 (Two) Times a Day As Needed for Constipation., Disp: 60 tablet, Rfl: 0    sucralfate (CARAFATE) 1 g tablet, Take 1 tablet by mouth Daily., Disp: , Rfl:     triamcinolone (KENALOG) 0.5 % cream, 1 (ONE) APPLICATION TO AFFECTED AREAS TWICE DAILY, Disp: , Rfl:     Review of Systems   Musculoskeletal:  Positive for arthralgias and back pain.        Bilateral shoulder pain    Left arm swelling       Vitals:    10/16/23 0801   BP: 144/87   BP Location: Right arm   Patient Position: Sitting   Cuff Size: Adult   Pulse: 80   Resp: 16   SpO2: 97%   PainSc:   6   PainLoc: Back       Urine Drug Screen: 7/17/2023  Appropriate: Yes    Objective   Physical Exam  Vitals and nursing note reviewed.   Constitutional:       General: She is not in acute distress.     Appearance: Normal  appearance.   Musculoskeletal:      Comments: Left arm:  1.  Diffusely swollen to the level of the left shoulder.  2.  Left shoulder range of motion significantly decreased secondary to pain  3.  Left shoulder joint nontender to palpation.    Right arm:  1.  Nontender to palpation.  No swelling.  2.  Right shoulder tender to palpation across the supraspinatus tendon  3.  Extremely decreased range of motion in all directions both passive and active secondary to pain     Lumbar spine exam:  1.  Facet loading equivocal bilaterally  2.  Facets weakly tender to palpation bilaterally  3.  Straight leg raise weakly positive bilaterally  4.  Lumbar range of motion decreased secondary to pain.   Neurological:      Mental Status: She is alert.      Sensory: No sensory deficit.      Motor: No weakness.           Assessment & Plan   Problems Addressed this Visit          Other    Osteoarthritis (Chronic)    Relevant Medications    HYDROcodone-acetaminophen (NORCO) 7.5-325 MG per tablet (Start on 1/4/2024)    HYDROcodone-acetaminophen (NORCO) 7.5-325 MG per tablet (Start on 12/5/2023)    HYDROcodone-acetaminophen (NORCO) 7.5-325 MG per tablet (Start on 11/6/2023)     Other Visit Diagnoses       Lumbar radiculopathy        Relevant Medications    HYDROcodone-acetaminophen (NORCO) 7.5-325 MG per tablet (Start on 1/4/2024)    HYDROcodone-acetaminophen (NORCO) 7.5-325 MG per tablet (Start on 12/5/2023)    HYDROcodone-acetaminophen (NORCO) 7.5-325 MG per tablet (Start on 11/6/2023)          Diagnoses         Codes Comments    Lumbar radiculopathy     ICD-10-CM: M54.16  ICD-9-CM: 724.4     Osteoarthritis of both shoulders, unspecified osteoarthritis type     ICD-10-CM: M19.011, M19.012  ICD-9-CM: 715.91             Plan:  1.  She reports that she got a chest x-ray instead of the MRI  2.  Refill hydrocodone  3.  UDS appropriate  4.  Inspect appropriate    --- Follow-up 3 months           INSPECT REPORT    As part of the patient's  treatment plan, I may be prescribing controlled substances. The patient has been made aware of appropriate use of such medications, including potential risk of somnolence, limited ability to drive and/or work safely, and the potential for dependence or overdose. It has also bee made clear that these medications are for use by this patient only, without concomitant use of alcohol or other substances unless prescribed.     Patient has completed prescribing agreement detailing terms of continued prescribing of controlled substances, including monitoring JACKIE reports, urine drug screening, and pill counts if necessary. The patient is aware that inappropriate use will results in cessation of prescribing such medications.    INSPECT report has been reviewed and scanned into the patient's chart.    As the clinician, I personally reviewed the INSPECT from 10/13/2023.    History and physical exam exhibit continued safe and appropriate use of controlled substances.      EMR Dragon/Transcription disclaimer:   Much of this encounter note is an electronic transcription/translation of spoken language to printed text. The electronic translation of spoken language may permit erroneous, or at times, nonsensical words or phrases to be inadvertently transcribed; Although I have reviewed the note for such errors, some may still exist.

## 2024-01-22 ENCOUNTER — OFFICE VISIT (OUTPATIENT)
Dept: PAIN MEDICINE | Facility: CLINIC | Age: 64
End: 2024-01-22
Payer: MEDICARE

## 2024-01-22 VITALS
OXYGEN SATURATION: 98 % | DIASTOLIC BLOOD PRESSURE: 82 MMHG | SYSTOLIC BLOOD PRESSURE: 159 MMHG | RESPIRATION RATE: 16 BRPM | WEIGHT: 145.8 LBS | BODY MASS INDEX: 25.03 KG/M2 | HEART RATE: 66 BPM

## 2024-01-22 DIAGNOSIS — M54.16 LUMBAR RADICULOPATHY: ICD-10-CM

## 2024-01-22 DIAGNOSIS — M19.011 OSTEOARTHRITIS OF BOTH SHOULDERS, UNSPECIFIED OSTEOARTHRITIS TYPE: ICD-10-CM

## 2024-01-22 DIAGNOSIS — M19.012 OSTEOARTHRITIS OF BOTH SHOULDERS, UNSPECIFIED OSTEOARTHRITIS TYPE: ICD-10-CM

## 2024-01-22 DIAGNOSIS — Z79.899 HIGH RISK MEDICATION USE: Primary | ICD-10-CM

## 2024-01-22 PROCEDURE — 1159F MED LIST DOCD IN RCRD: CPT | Performed by: STUDENT IN AN ORGANIZED HEALTH CARE EDUCATION/TRAINING PROGRAM

## 2024-01-22 PROCEDURE — 3077F SYST BP >= 140 MM HG: CPT | Performed by: STUDENT IN AN ORGANIZED HEALTH CARE EDUCATION/TRAINING PROGRAM

## 2024-01-22 PROCEDURE — 1160F RVW MEDS BY RX/DR IN RCRD: CPT | Performed by: STUDENT IN AN ORGANIZED HEALTH CARE EDUCATION/TRAINING PROGRAM

## 2024-01-22 PROCEDURE — 1125F AMNT PAIN NOTED PAIN PRSNT: CPT | Performed by: STUDENT IN AN ORGANIZED HEALTH CARE EDUCATION/TRAINING PROGRAM

## 2024-01-22 PROCEDURE — 3079F DIAST BP 80-89 MM HG: CPT | Performed by: STUDENT IN AN ORGANIZED HEALTH CARE EDUCATION/TRAINING PROGRAM

## 2024-01-22 PROCEDURE — 99214 OFFICE O/P EST MOD 30 MIN: CPT | Performed by: STUDENT IN AN ORGANIZED HEALTH CARE EDUCATION/TRAINING PROGRAM

## 2024-01-22 RX ORDER — HYDROCODONE BITARTRATE AND ACETAMINOPHEN 7.5; 325 MG/1; MG/1
1 TABLET ORAL EVERY 8 HOURS PRN
Qty: 90 TABLET | Refills: 0 | Status: SHIPPED | OUTPATIENT
Start: 2024-02-10

## 2024-01-22 RX ORDER — MONTELUKAST SODIUM 10 MG/1
TABLET ORAL
COMMUNITY
Start: 2024-01-21

## 2024-01-22 RX ORDER — DEXTROMETHORPHAN HYDROBROMIDE AND PROMETHAZINE HYDROCHLORIDE 15; 6.25 MG/5ML; MG/5ML
SYRUP ORAL
COMMUNITY
Start: 2023-11-17

## 2024-01-22 RX ORDER — HYDROCODONE BITARTRATE AND ACETAMINOPHEN 7.5; 325 MG/1; MG/1
1 TABLET ORAL EVERY 8 HOURS PRN
Qty: 90 TABLET | Refills: 0 | Status: SHIPPED | OUTPATIENT
Start: 2024-03-09

## 2024-01-22 RX ORDER — HYDROCODONE BITARTRATE AND ACETAMINOPHEN 7.5; 325 MG/1; MG/1
1 TABLET ORAL EVERY 8 HOURS PRN
Qty: 90 TABLET | Refills: 0 | Status: SHIPPED | OUTPATIENT
Start: 2024-04-08

## 2024-03-04 ENCOUNTER — TELEPHONE (OUTPATIENT)
Dept: CARDIOLOGY | Facility: CLINIC | Age: 64
End: 2024-03-04
Payer: MEDICARE

## 2024-03-14 ENCOUNTER — HOSPITAL ENCOUNTER (OUTPATIENT)
Dept: MRI IMAGING | Facility: HOSPITAL | Age: 64
Discharge: HOME | End: 2024-03-14
Admitting: STUDENT IN AN ORGANIZED HEALTH CARE EDUCATION/TRAINING PROGRAM
Payer: MEDICARE

## 2024-03-14 DIAGNOSIS — M54.16 LUMBAR RADICULOPATHY: ICD-10-CM

## 2024-03-14 PROCEDURE — 72148 MRI LUMBAR SPINE W/O DYE: CPT

## 2024-04-23 ENCOUNTER — CLINICAL SUPPORT NO REQUIREMENTS (OUTPATIENT)
Dept: CARDIOLOGY | Facility: CLINIC | Age: 64
End: 2024-04-23
Payer: MEDICARE

## 2024-04-23 ENCOUNTER — OFFICE VISIT (OUTPATIENT)
Dept: CARDIOLOGY | Facility: CLINIC | Age: 64
End: 2024-04-23
Payer: MEDICARE

## 2024-04-23 ENCOUNTER — TELEPHONE (OUTPATIENT)
Dept: CARDIOLOGY | Facility: CLINIC | Age: 64
End: 2024-04-23

## 2024-04-23 VITALS
HEIGHT: 64 IN | WEIGHT: 156 LBS | DIASTOLIC BLOOD PRESSURE: 93 MMHG | HEART RATE: 84 BPM | SYSTOLIC BLOOD PRESSURE: 169 MMHG | BODY MASS INDEX: 26.63 KG/M2 | OXYGEN SATURATION: 99 %

## 2024-04-23 DIAGNOSIS — Z95.0 PACEMAKER: ICD-10-CM

## 2024-04-23 DIAGNOSIS — I10 PRIMARY HYPERTENSION: Chronic | ICD-10-CM

## 2024-04-23 DIAGNOSIS — Z01.810 PREOP CARDIOVASCULAR EXAM: ICD-10-CM

## 2024-04-23 DIAGNOSIS — Z95.0 PACEMAKER: Primary | ICD-10-CM

## 2024-04-23 DIAGNOSIS — I49.5 SICK SINUS SYNDROME: ICD-10-CM

## 2024-04-23 DIAGNOSIS — I49.5 SICK SINUS SYNDROME: Primary | ICD-10-CM

## 2024-04-23 RX ORDER — BLOOD-GLUCOSE SENSOR
EACH MISCELLANEOUS
COMMUNITY
Start: 2024-03-25

## 2024-04-23 RX ORDER — OMEPRAZOLE 40 MG/1
40 CAPSULE, DELAYED RELEASE ORAL DAILY
COMMUNITY
Start: 2024-02-06

## 2024-04-23 RX ORDER — ACYCLOVIR 400 MG/1
TABLET ORAL
COMMUNITY
Start: 2024-04-22

## 2024-04-23 RX ORDER — INSULIN LISPRO 100 [IU]/ML
INJECTION, SUSPENSION SUBCUTANEOUS
COMMUNITY

## 2024-04-23 RX ORDER — FLURBIPROFEN SODIUM 0.3 MG/ML
SOLUTION/ DROPS OPHTHALMIC
COMMUNITY
Start: 2024-02-22

## 2024-04-23 NOTE — PROGRESS NOTES
Progress note      Name: Shantel Maharaj ADMIT: (Not on file)   : 1960  PCP: Gris Dominguez MD    MRN: [id number] LOS: 0 days   AGE/SEX: 64 y.o. female  ROOM: Room/bed info not found     Chief Complaint   Patient presents with    Follow-up     6mth        Subjective       History of present illness  Shantel Maharaj is a 64-year-old female patient's who has hypertension, dyslipidemia, diabetes, history of breast cancer with bilateral mastectomy, chronically swollen left arm due to lymphedema, sick sinus syndrome status post dual-chamber pacemaker implantation on 2022 right-sided, here today for follow-up.  Patient has been doing well, denies any chest pain or shortness of breath, no palpitations no lower extremity edema no syncopal episodes.     Past Medical History:   Diagnosis Date    Arm pain     Breast cancer     Cancer     lt breast -- reocc rt breast 2020    Chronic pain disorder     Colorectal polyp detected on colonoscopy 10/06/2023    COVID-19     Diabetes     Drug therapy     High cholesterol     Hx of radiation therapy     Hypertension     Joint pain     Low back pain     Neuropathy in diabetes     Osteoarthritis     Osteopenia     Thyroid disease      Past Surgical History:   Procedure Laterality Date    APPENDECTOMY      BREAST LUMPECTOMY      2021-- [facility]-- cancer-- reocc    BREAST SURGERY      removal of left breast     CARDIAC ELECTROPHYSIOLOGY PROCEDURE N/A 2022    Procedure: Pacemaker DC new-[place];  Surgeon: Ramsey Dinero MD;  Location: [facility] INVASIVE LOCATION;  Service: Cardiology;  Laterality: N/A;     SECTION      x2    GALLBLADDER SURGERY      INSERT / REPLACE / REMOVE PACEMAKER      KNEE SURGERY      left knee - petella broken     OTHER SURGICAL HISTORY      rt arm surgery with shlomo  placement rt arm    SHOULDER SURGERY      broken-lt- in 12 places    WRIST SURGERY      rt - broken     Family History   Problem Relation Age of Onset    Cancer Mother      Breast cancer Mother     Heart disease Father         Nobles    Hypertension Father     Breast cancer Maternal Aunt     Breast cancer Maternal Aunt     Breast cancer Maternal Aunt      Social History     Tobacco Use    Smoking status: Former     Current packs/day: 1.00     Types: Cigarettes     Passive exposure: Past    Smokeless tobacco: Never    Tobacco comments:     quit  smoking in 2008   Vaping Use    Vaping status: Never Used   Substance Use Topics    Alcohol use: Never    Drug use: Never       Current Outpatient Medications:     amLODIPine (NORVASC) 10 MG tablet, Take 1 tablet by mouth Daily., Disp: , Rfl:     anastrozole (ARIMIDEX) 1 MG tablet, Take 1 tablet by mouth Daily., Disp: , Rfl:     atorvastatin (LIPITOR) 20 MG tablet, TAKE 1 TABLET BY MOUTH EVERY DAY AT NIGHT, Disp: 90 tablet, Rfl: 3    B-D UF III MINI PEN NEEDLES 31G X 5 MM misc, USE 1 (ONE) NEEDLE TWO TIMES DAILY, Disp: , Rfl:     Calcium 600/Vitamin D 600-400 MG-UNIT chewable tablet, Chew 1 tablet 2 (Two) Times a Day., Disp: , Rfl:     Continuous Glucose  (Dexcom G7 ) device, , Disp: , Rfl:     Continuous Glucose Sensor (FreeStyle Tali 3 Sensor) [facility], CHANGE SENSOR EVERY 14 DAYS, Disp: , Rfl:     cyanocobalamin 1000 MCG/ML injection, INJECT 1ML ONCE MONTHLY, Disp: , Rfl:     cyclobenzaprine (FLEXERIL) 10 MG tablet, Take 1 tablet by mouth 3 (Three) Times a Day As Needed for Muscle Spasms., Disp: 90 tablet, Rfl: 2    dexlansoprazole (DEXILANT) 60 MG capsule, Take 1 capsule by mouth Every Night., Disp: , Rfl:     escitalopram (LEXAPRO) 10 MG tablet, Take 1 tablet by mouth Daily., Disp: , Rfl:     fenofibrate (TRICOR) 145 MG tablet, Take 1 tablet by mouth As Needed., Disp: , Rfl:     ferrous sulfate 325 (65 FE) MG tablet, Take 1 tablet by mouth As Needed., Disp: , Rfl:     fluticasone (FLONASE) 50 MCG/ACT nasal spray, 1 spray by Each Nare route As Needed. As needed, Disp: , Rfl:     gabapentin (NEURONTIN) 100 MG capsule, Take 1  capsule by mouth 3 (Three) Times a Day As Needed., Disp: , Rfl:     glipizide (GLUCOTROL XL) 2.5 MG 24 hr tablet, Take 1 tablet by mouth Every Night., Disp: , Rfl:     HYDROcodone-acetaminophen (NORCO) 7.5-325 MG per tablet, Take 1 tablet by mouth Every 8 (Eight) Hours As Needed for Moderate Pain., Disp: 90 tablet, Rfl: 0    HYDROcodone-acetaminophen (NORCO) 7.5-325 MG per tablet, Take 1 tablet by mouth Every 8 (Eight) Hours As Needed for Moderate Pain., Disp: 90 tablet, Rfl: 0    HYDROcodone-acetaminophen (NORCO) 7.5-325 MG per tablet, Take 1 tablet by mouth Every 8 (Eight) Hours As Needed for Moderate Pain., Disp: 90 tablet, Rfl: 0    Insulin Lispro Prot & Lispro (humaLOG 75-25) (75-25) 100 UNIT/ML suspension pen-injector pen, INJECT 12 UNITS DAILY BEFORE BREAKFAST AND BEFORE DINNER., Disp: , Rfl:     Lancets (OneTouch Delica Plus [id number]) misc, 1 each Daily. Dx: E11.90, Disp: 100 each, Rfl: 2    levothyroxine (SYNTHROID, LEVOTHROID) 100 MCG tablet, TAKE 1 (ONE) TABLET DAILY WITH 200MCG TO MAKE 300 MCG, Disp: , Rfl:     lisinopril (PRINIVIL,ZESTRIL) 5 MG tablet, Take 1 tablet by mouth Daily., Disp: , Rfl:     montelukast (SINGULAIR) 10 MG tablet, , Disp: , Rfl:     nebivolol (BYSTOLIC) 10 MG tablet, Take 1 tablet by mouth Daily., Disp: , Rfl:     omeprazole (priLOSEC) 40 MG capsule, Take 1 capsule by mouth Daily., Disp: , Rfl:     OneTouch Verio test strip, 1 each by Other route Daily. Dx: E11.90 Use as instructed, Disp: 50 each, Rfl: 2    oxybutynin XL (DITROPAN-XL) 5 MG 24 hr tablet, Take 1 tablet by mouth Daily., Disp: , Rfl:     polyethylene glycol (MIRALAX) 17 GM/SCOOP powder, Take 17 g by mouth Daily As Needed (Use if senna-docusate is ineffective)., Disp: 510 g, Rfl: 0    potassium chloride 10 MEQ CR tablet, Take 1 tablet by mouth 2 (Two) Times a Day., Disp: 15 tablet, Rfl: 0    promethazine (PHENERGAN) 25 MG tablet, Take 1 tablet by mouth As Needed., Disp: , Rfl:     promethazine-dextromethorphan  (PROMETHAZINE-DM) 6.25-15 MG/5ML syrup, TAKE 5 MILLILITER BY MOUTH EVERY 6 HOURS AS NEEDED FOR COUGH, Disp: , Rfl:     Rybelsus 7 MG tablet, Take 7 mg by mouth Daily., Disp: , Rfl:     sennosides-docusate (PERICOLACE) 8.6-50 MG per tablet, Take 2 tablets by mouth 2 (Two) Times a Day As Needed for Constipation., Disp: 60 tablet, Rfl: 0    sucralfate (CARAFATE) 1 g tablet, Take 1 tablet by mouth Daily., Disp: , Rfl:     triamcinolone (KENALOG) 0.5 % cream, 1 (ONE) APPLICATION TO AFFECTED AREAS TWICE DAILY, Disp: , Rfl:   Allergies:  Valsartan      Physical Exam  VITALS REVIEWED    General:      well developed, in no acute distress.    Head:      normocephalic and atraumatic.    Eyes:      PERRL/EOM intact, conjunctiva and sclera clear with out nystagmus.    Neck:      no masses, thyromegaly,  trachea central with normal respiratory effort and PMI displaced laterally  Lungs:      Clear to auscultation bilaterally  Heart:       Regular rate and rhythm, no murmur  Msk:      no deformity or scoliosis noted of thoracic or lumbar spine.    Pulses:      pulses normal in all 4 extremities.    Extremities:       No lower extremity edema  Neurologic:      no focal deficits.   alert oriented x3  Skin:      intact without lesions or rashes.    Psych:      alert and cooperative; normal mood and affect; normal attention span and concentration.      Result Review :               Pertinent cardiac workup    Echo 2/22/2022 ejection fraction 60 to 65%  Stress test 10/4/2022 low risk         Procedures        Assessment and Plan      Shantel Maharaj is a 64-year-old female patient who has sick sinus syndrome, dual-chamber pacemaker here for follow-up, she also has hypertension.  Device check today showed appropriate function, no arrhythmias.  Her blood pressure is elevated today but she is on 3 antihypertensives and has not taken them yet today.  Cardiac wise she denies any anginal symptoms or CHF symptoms.  Patient is scheduled to have  a knee surgery.  She is cleared from cardiac standpoint to undergo knee surgery.  I will see him in 6 months for follow-up.    Diagnoses and all orders for this visit:    1. Sick sinus syndrome (Primary)  Overview:  Added automatically from request for surgery [id number]      2. Pacemaker    3. Primary hypertension    4. Preop cardiovascular exam           Return in about 6 months (around 10/23/2024).  Patient was given instructions and counseling regarding her condition or for health maintenance advice. Please see specific information pulled into the AVS if appropriate.

## 2024-04-23 NOTE — TELEPHONE ENCOUNTER
FACILITY: [facility]  DR: Le   PHONE: [phone]  FAX: [phone]  PROCEDURE: L arthroplasty knee  SCHEDULED: TBD  MEDS TO HOLD: Not on any blood thinners    Clearance has been faxed and placed to be scanned in the chart

## 2024-04-29 ENCOUNTER — OFFICE VISIT (OUTPATIENT)
Dept: PAIN MEDICINE | Facility: CLINIC | Age: 64
End: 2024-04-29
Payer: MEDICARE

## 2024-04-29 VITALS
BODY MASS INDEX: 27.19 KG/M2 | HEART RATE: 58 BPM | OXYGEN SATURATION: 99 % | DIASTOLIC BLOOD PRESSURE: 83 MMHG | SYSTOLIC BLOOD PRESSURE: 155 MMHG | RESPIRATION RATE: 16 BRPM | WEIGHT: 158.4 LBS

## 2024-04-29 DIAGNOSIS — M19.012 OSTEOARTHRITIS OF BOTH SHOULDERS, UNSPECIFIED OSTEOARTHRITIS TYPE: ICD-10-CM

## 2024-04-29 DIAGNOSIS — M54.16 LUMBAR RADICULOPATHY: ICD-10-CM

## 2024-04-29 DIAGNOSIS — M19.011 OSTEOARTHRITIS OF BOTH SHOULDERS, UNSPECIFIED OSTEOARTHRITIS TYPE: ICD-10-CM

## 2024-04-29 PROCEDURE — 3077F SYST BP >= 140 MM HG: CPT | Performed by: STUDENT IN AN ORGANIZED HEALTH CARE EDUCATION/TRAINING PROGRAM

## 2024-04-29 PROCEDURE — 99214 OFFICE O/P EST MOD 30 MIN: CPT | Performed by: STUDENT IN AN ORGANIZED HEALTH CARE EDUCATION/TRAINING PROGRAM

## 2024-04-29 PROCEDURE — 1125F AMNT PAIN NOTED PAIN PRSNT: CPT | Performed by: STUDENT IN AN ORGANIZED HEALTH CARE EDUCATION/TRAINING PROGRAM

## 2024-04-29 PROCEDURE — 1160F RVW MEDS BY RX/DR IN RCRD: CPT | Performed by: STUDENT IN AN ORGANIZED HEALTH CARE EDUCATION/TRAINING PROGRAM

## 2024-04-29 PROCEDURE — 1159F MED LIST DOCD IN RCRD: CPT | Performed by: STUDENT IN AN ORGANIZED HEALTH CARE EDUCATION/TRAINING PROGRAM

## 2024-04-29 PROCEDURE — 3079F DIAST BP 80-89 MM HG: CPT | Performed by: STUDENT IN AN ORGANIZED HEALTH CARE EDUCATION/TRAINING PROGRAM

## 2024-04-29 RX ORDER — HYDROCODONE BITARTRATE AND ACETAMINOPHEN 7.5; 325 MG/1; MG/1
1 TABLET ORAL EVERY 8 HOURS PRN
Qty: 90 TABLET | Refills: 0 | Status: SHIPPED | OUTPATIENT
Start: 2024-05-23

## 2024-04-29 RX ORDER — HYDROCODONE BITARTRATE AND ACETAMINOPHEN 7.5; 325 MG/1; MG/1
1 TABLET ORAL EVERY 8 HOURS PRN
Qty: 90 TABLET | Refills: 0 | Status: SHIPPED | OUTPATIENT
Start: 2024-07-20

## 2024-04-29 RX ORDER — HYDROCODONE BITARTRATE AND ACETAMINOPHEN 7.5; 325 MG/1; MG/1
1 TABLET ORAL EVERY 8 HOURS PRN
Qty: 90 TABLET | Refills: 0 | Status: SHIPPED | OUTPATIENT
Start: 2024-06-22

## 2024-04-29 NOTE — PROGRESS NOTES
CHIEF COMPLAINT  Chief Complaint   Patient presents with    Back Pain     LD [place] 04/28/24 pm       Primary Care  Gris Dominguez MD    Subjective   Shantel Maharaj is a 64 y.o. female  who presents for follow-up.She states she has had longstanding low back pain and also developed bilateral shoulder and left arm pain following a mastectomy and lymph node stripping.  She was previously being evaluated by an outside pain clinic who is providing her narcotic pain medication as well as injections, but she does not know the type of injection she was receiving.  She states that additionally, she is required to attend chiropractic sessions which she thought were exacerbating her pain.  Given this, she wanted to transition care to [facility].    Osteoarthritis  Her past medical history is significant for osteoarthritis.   Back Pain  Associated symptoms include leg pain.   Arm Pain     Knee Pain     Leg Pain          Location: Low back without radiation, bilateral shoulders, left arm Onset: Many years ago  Duration: Constant, slowly progressing  Timing: Constant throughout the day  Quality: The pain in the shoulder is a sharp, stabbing pain.  The low back as an aching, cramping pain  Severity: Today: 6       Last Week: 6       Worst: 8  Modifying Factors: The pain is worse with walking and physical activity.  The pain is slightly improved with lying flat as well as medication and compression on the left arm     Physical Therapy: yes    Interval Update 04/29/2024: Unchanged.  Minimal benefit with hydrocodone 3 times daily.  Planning left knee replacement.    The following portions of the patient's history were reviewed and updated as appropriate: allergies, current medications, past family history, past medical history, past social history, past surgical history and problem list.      Current Outpatient Medications:     amLODIPine (NORVASC) 10 MG tablet, Take 1 tablet by mouth Daily., Disp: , Rfl:     anastrozole  (ARIMIDEX) 1 MG tablet, Take 1 tablet by mouth Daily., Disp: , Rfl:     atorvastatin (LIPITOR) 20 MG tablet, TAKE 1 TABLET BY MOUTH EVERY DAY AT NIGHT, Disp: 90 tablet, Rfl: 3    B-D UF III MINI PEN NEEDLES 31G X 5 MM misc, USE 1 (ONE) NEEDLE TWO TIMES DAILY, Disp: , Rfl:     Calcium 600/Vitamin D 600-400 MG-UNIT chewable tablet, Chew 1 tablet 2 (Two) Times a Day., Disp: , Rfl:     Continuous Glucose  (Dexcom G7 ) device, , Disp: , Rfl:     Continuous Glucose Sensor (FreeStyle Tali 3 Sensor) [facility] [place], CHANGE SENSOR EVERY 14 DAYS, Disp: , Rfl:     cyanocobalamin 1000 MCG/ML injection, INJECT 1ML ONCE MONTHLY, Disp: , Rfl:     cyclobenzaprine (FLEXERIL) 10 MG tablet, Take 1 tablet by mouth 3 (Three) Times a Day As Needed for Muscle Spasms., Disp: 90 tablet, Rfl: 2    dexlansoprazole (DEXILANT) 60 MG capsule, Take 1 capsule by mouth Every Night., Disp: , Rfl:     escitalopram (LEXAPRO) 10 MG tablet, Take 1 tablet by mouth Daily., Disp: , Rfl:     fenofibrate (TRICOR) 145 MG tablet, Take 1 tablet by mouth As Needed., Disp: , Rfl:     ferrous sulfate 325 (65 FE) MG tablet, Take 1 tablet by mouth As Needed., Disp: , Rfl:     fluticasone (FLONASE) 50 MCG/ACT nasal spray, 1 spray by Each Nare route As Needed. As needed, Disp: , Rfl:     gabapentin (NEURONTIN) 100 MG capsule, Take 1 capsule by mouth 3 (Three) Times a Day As Needed., Disp: , Rfl:     glipizide (GLUCOTROL XL) 2.5 MG 24 hr tablet, Take 1 tablet by mouth Every Night., Disp: , Rfl:     [START ON 7/20/2024] HYDROcodone-acetaminophen (NORCO) 7.5-325 MG per tablet, Take 1 tablet by mouth Every 8 (Eight) Hours As Needed for Moderate Pain., Disp: 90 tablet, Rfl: 0    [START ON 6/22/2024] HYDROcodone-acetaminophen (NORCO) 7.5-325 MG per tablet, Take 1 tablet by mouth Every 8 (Eight) Hours As Needed for Moderate Pain., Disp: 90 tablet, Rfl: 0    [START ON 5/23/2024] HYDROcodone-acetaminophen (NORCO) 7.5-325 MG per tablet, Take 1 tablet by mouth Every 8  (Eight) Hours As Needed for Moderate Pain., Disp: 90 tablet, Rfl: 0    Insulin Lispro Prot & Lispro (humaLOG 75-25) (75-25) 100 UNIT/ML suspension pen-injector pen, INJECT 12 UNITS DAILY BEFORE BREAKFAST AND BEFORE DINNER., Disp: , Rfl:     Lancets (OneTouch Delica Plus [id number]) misc, 1 each Daily. Dx: E11.90, Disp: 100 each, Rfl: 2    levothyroxine (SYNTHROID, LEVOTHROID) 100 MCG tablet, TAKE 1 (ONE) TABLET DAILY WITH 200MCG TO MAKE 300 MCG, Disp: , Rfl:     lisinopril (PRINIVIL,ZESTRIL) 5 MG tablet, Take 1 tablet by mouth Daily., Disp: , Rfl:     montelukast (SINGULAIR) 10 MG tablet, , Disp: , Rfl:     nebivolol (BYSTOLIC) 10 MG tablet, Take 1 tablet by mouth Daily., Disp: , Rfl:     omeprazole (priLOSEC) 40 MG capsule, Take 1 capsule by mouth Daily., Disp: , Rfl:     OneTouch Verio test strip, 1 each by Other route Daily. Dx: E11.90 Use as instructed, Disp: 50 each, Rfl: 2    oxybutynin XL (DITROPAN-XL) 5 MG 24 hr tablet, Take 1 tablet by mouth Daily., Disp: , Rfl:     polyethylene glycol (MIRALAX) 17 GM/SCOOP powder, Take 17 g by mouth Daily As Needed (Use if senna-docusate is ineffective)., Disp: 510 g, Rfl: 0    potassium chloride 10 MEQ CR tablet, Take 1 tablet by mouth 2 (Two) Times a Day., Disp: 15 tablet, Rfl: 0    promethazine (PHENERGAN) 25 MG tablet, Take 1 tablet by mouth As Needed., Disp: , Rfl:     promethazine-dextromethorphan (PROMETHAZINE-DM) 6.25-15 MG/5ML syrup, TAKE 5 MILLILITER BY MOUTH EVERY 6 HOURS AS NEEDED FOR COUGH, Disp: , Rfl:     Rybelsus 7 MG tablet, Take 7 mg by mouth Daily., Disp: , Rfl:     sennosides-docusate (PERICOLACE) 8.6-50 MG per tablet, Take 2 tablets by mouth 2 (Two) Times a Day As Needed for Constipation., Disp: 60 tablet, Rfl: 0    sucralfate (CARAFATE) 1 g tablet, Take 1 tablet by mouth Daily., Disp: , Rfl:     triamcinolone (KENALOG) 0.5 % cream, 1 (ONE) APPLICATION TO AFFECTED AREAS TWICE DAILY, Disp: , Rfl:     Review of Systems   Musculoskeletal:  Positive for  arthralgias and back pain.        Bilateral shoulder pain    Left arm swelling       Vitals:    04/29/24 0754   BP: 155/83   Pulse: 58   Resp: 16   SpO2: 99%   Weight: 71.8 kg (158 lb 6.4 oz)   PainSc:   5       Urine Drug Screen: 1/22/2024  Appropriate: Yes    Objective   Physical Exam  Vitals and nursing note reviewed.   Constitutional:       General: She is not in acute distress.     Appearance: Normal appearance.   Musculoskeletal:      Comments: Left arm:  1.  Diffusely swollen to the level of the left shoulder.  2.  Left shoulder range of motion significantly decreased secondary to pain  3.  Left shoulder joint nontender to palpation.    Right arm:  1.  Nontender to palpation.  No swelling.  2.  Right shoulder tender to palpation across the supraspinatus tendon  3.  Extremely decreased range of motion in all directions both passive and active secondary to pain     Lumbar spine exam:  1.  Facet loading equivocal bilaterally  2.  Facets weakly tender to palpation bilaterally  3.  Straight leg raise weakly positive bilaterally  4.  Lumbar range of motion decreased secondary to pain.   Neurological:      Mental Status: She is alert.      Sensory: No sensory deficit.      Motor: No weakness.           Assessment & Plan   Problems Addressed this Visit       Osteoarthritis (Chronic)    Relevant Medications    HYDROcodone-acetaminophen (NORCO) 7.5-325 MG per tablet (Start on 7/20/2024)    HYDROcodone-acetaminophen (NORCO) 7.5-325 MG per tablet (Start on 6/22/2024)    HYDROcodone-acetaminophen (NORCO) 7.5-325 MG per tablet (Start on 5/23/2024)     Other Visit Diagnoses       Lumbar radiculopathy        Relevant Medications    HYDROcodone-acetaminophen (NORCO) 7.5-325 MG per tablet (Start on 7/20/2024)    HYDROcodone-acetaminophen (NORCO) 7.5-325 MG per tablet (Start on 6/22/2024)    HYDROcodone-acetaminophen (NORCO) 7.5-325 MG per tablet (Start on 5/23/2024)          Diagnoses         Codes Comments    Lumbar  radiculopathy     ICD-10-CM: M54.16  ICD-9-CM: 724.4     Osteoarthritis of both shoulders, unspecified osteoarthritis type     ICD-10-CM: M19.011, M19.012  ICD-9-CM: 715.91             Plan:  1.  Lumbar MRI with very mild degenerative change  2.  Refill hydrocodone  3.  Repeat UDS today  4.  Inspect appropriate    --- Follow-up 3 months           INSPECT REPORT    As part of the patient's treatment plan, I may be prescribing controlled substances. The patient has been made aware of appropriate use of such medications, including potential risk of somnolence, limited ability to drive and/or work safely, and the potential for dependence or overdose. It has also bee made clear that these medications are for use by this patient only, without concomitant use of alcohol or other substances unless prescribed.     Patient has completed prescribing agreement detailing terms of continued prescribing of controlled substances, including monitoring JACKIE reports, urine drug screening, and pill counts if necessary. The patient is aware that inappropriate use will results in cessation of prescribing such medications.    INSPECT report has been reviewed and scanned into the patient's chart.    As the clinician, I personally reviewed the INSPECT from 4/26/2024.    History and physical exam exhibit continued safe and appropriate use of controlled substances.      EMR Dragon/Transcription disclaimer:   Much of this encounter note is an electronic transcription/translation of spoken language to printed text. The electronic translation of spoken language may permit erroneous, or at times, nonsensical words or phrases to be inadvertently transcribed; Although I have reviewed the note for such errors, some may still exist.

## 2024-07-15 RX ORDER — ATORVASTATIN CALCIUM 20 MG/1
TABLET, FILM COATED ORAL
Qty: 90 TABLET | Refills: 3 | Status: SHIPPED | OUTPATIENT
Start: 2024-07-15

## 2024-07-15 NOTE — TELEPHONE ENCOUNTER
Rx Refill Note  Requested Prescriptions     Pending Prescriptions Disp Refills    atorvastatin (LIPITOR) 20 MG tablet [Pharmacy Med Name: ATORVASTATIN 20 MG TABLET] 90 tablet 3     Sig: TAKE 1 TABLET BY MOUTH EVERY DAY AT NIGHT      Last office visit with prescribing clinician: 4/23/2024   Last telemedicine visit with prescribing clinician: Visit date not found   Next office visit with prescribing clinician: 10/30/2024                         Would you like a call back once the refill request has been completed: [] Yes [] No    If the office needs to give you a call back, can they leave a voicemail: [] Yes [] No    Dina Esquivel MA  07/15/24, 10:55 EDT

## 2024-07-29 ENCOUNTER — OFFICE VISIT (OUTPATIENT)
Dept: PAIN MEDICINE | Facility: CLINIC | Age: 64
End: 2024-07-29
Payer: MEDICARE

## 2024-07-29 VITALS
SYSTOLIC BLOOD PRESSURE: 172 MMHG | RESPIRATION RATE: 16 BRPM | DIASTOLIC BLOOD PRESSURE: 97 MMHG | OXYGEN SATURATION: 96 % | BODY MASS INDEX: 28.49 KG/M2 | HEART RATE: 68 BPM | WEIGHT: 166 LBS

## 2024-07-29 DIAGNOSIS — M19.012 OSTEOARTHRITIS OF BOTH SHOULDERS, UNSPECIFIED OSTEOARTHRITIS TYPE: ICD-10-CM

## 2024-07-29 DIAGNOSIS — M54.16 LUMBAR RADICULOPATHY: ICD-10-CM

## 2024-07-29 DIAGNOSIS — M19.011 OSTEOARTHRITIS OF BOTH SHOULDERS, UNSPECIFIED OSTEOARTHRITIS TYPE: ICD-10-CM

## 2024-07-29 PROCEDURE — 99214 OFFICE O/P EST MOD 30 MIN: CPT | Performed by: STUDENT IN AN ORGANIZED HEALTH CARE EDUCATION/TRAINING PROGRAM

## 2024-07-29 PROCEDURE — 1159F MED LIST DOCD IN RCRD: CPT | Performed by: STUDENT IN AN ORGANIZED HEALTH CARE EDUCATION/TRAINING PROGRAM

## 2024-07-29 PROCEDURE — 1125F AMNT PAIN NOTED PAIN PRSNT: CPT | Performed by: STUDENT IN AN ORGANIZED HEALTH CARE EDUCATION/TRAINING PROGRAM

## 2024-07-29 PROCEDURE — 3080F DIAST BP >= 90 MM HG: CPT | Performed by: STUDENT IN AN ORGANIZED HEALTH CARE EDUCATION/TRAINING PROGRAM

## 2024-07-29 PROCEDURE — 1160F RVW MEDS BY RX/DR IN RCRD: CPT | Performed by: STUDENT IN AN ORGANIZED HEALTH CARE EDUCATION/TRAINING PROGRAM

## 2024-07-29 PROCEDURE — 3077F SYST BP >= 140 MM HG: CPT | Performed by: STUDENT IN AN ORGANIZED HEALTH CARE EDUCATION/TRAINING PROGRAM

## 2024-07-29 RX ORDER — HYDROCODONE BITARTRATE AND ACETAMINOPHEN 7.5; 325 MG/1; MG/1
1 TABLET ORAL EVERY 8 HOURS PRN
Qty: 90 TABLET | Refills: 0 | Status: SHIPPED | OUTPATIENT
Start: 2024-07-29

## 2024-07-29 RX ORDER — HYDROCODONE BITARTRATE AND ACETAMINOPHEN 7.5; 325 MG/1; MG/1
1 TABLET ORAL EVERY 8 HOURS PRN
Qty: 90 TABLET | Refills: 0 | Status: SHIPPED | OUTPATIENT
Start: 2024-09-27

## 2024-07-29 RX ORDER — HYDROCODONE BITARTRATE AND ACETAMINOPHEN 7.5; 325 MG/1; MG/1
1 TABLET ORAL EVERY 8 HOURS PRN
Qty: 90 TABLET | Refills: 0 | Status: SHIPPED | OUTPATIENT
Start: 2024-08-28

## 2024-07-29 NOTE — PROGRESS NOTES
CHIEF COMPLAINT  Chief Complaint   Patient presents with    Back Pain     LD [place] 7/29 5a       Primary Care  Gris Dominguez MD    Subjective   Shantel Maharaj is a 64 y.o. female  who presents for follow-up.She states she has had longstanding low back pain and also developed bilateral shoulder and left arm pain following a mastectomy and lymph node stripping.  She was previously being evaluated by an outside pain clinic who is providing her narcotic pain medication as well as injections, but she does not know the type of injection she was receiving.  She states that additionally, she is required to attend chiropractic sessions which she thought were exacerbating her pain.  Given this, she wanted to transition care to [facility] [place], operated by [facility].    Osteoarthritis  Her past medical history is significant for osteoarthritis.   Back Pain  Associated symptoms include leg pain.   Arm Pain     Knee Pain     Leg Pain          Location: Low back without radiation, bilateral shoulders, left arm Onset: Many years ago  Duration: Constant, slowly progressing  Timing: Constant throughout the day  Quality: The pain in the shoulder is a sharp, stabbing pain.  The low back as an aching, cramping pain  Severity: Today: 6       Last Week: 6       Worst: 8  Modifying Factors: The pain is worse with walking and physical activity.  The pain is slightly improved with lying flat as well as medication and compression on the left arm     Physical Therapy: yes    Interval Update 07/29/2024: Unchanged.  Continues with hydrocodone 3 times daily.  Reports she recently fell and broke her knee.  No longer a candidate for replacement.    The following portions of the patient's history were reviewed and updated as appropriate: allergies, current medications, past family history, past medical history, past social history, past surgical history and problem list.      Current Outpatient Medications:     amLODIPine (NORVASC) 10 MG tablet, Take 1 tablet by mouth  Daily., Disp: , Rfl:     anastrozole (ARIMIDEX) 1 MG tablet, Take 1 tablet by mouth Daily., Disp: , Rfl:     atorvastatin (LIPITOR) 20 MG tablet, TAKE 1 TABLET BY MOUTH EVERY DAY AT NIGHT, Disp: 90 tablet, Rfl: 3    B-D UF III MINI PEN NEEDLES 31G X 5 MM misc, USE 1 (ONE) NEEDLE TWO TIMES DAILY, Disp: , Rfl:     Calcium 600/Vitamin D 600-400 MG-UNIT chewable tablet, Chew 1 tablet 2 (Two) Times a Day., Disp: , Rfl:     Continuous Glucose  (Dexcom G7 ) device, , Disp: , Rfl:     Continuous Glucose Sensor (FreeStyle Tali 3 Sensor) [facility], CHANGE SENSOR EVERY 14 DAYS, Disp: , Rfl:     cyanocobalamin 1000 MCG/ML injection, INJECT 1ML ONCE MONTHLY, Disp: , Rfl:     cyclobenzaprine (FLEXERIL) 10 MG tablet, Take 1 tablet by mouth 3 (Three) Times a Day As Needed for Muscle Spasms., Disp: 90 tablet, Rfl: 2    dexlansoprazole (DEXILANT) 60 MG capsule, Take 1 capsule by mouth Every Night., Disp: , Rfl:     escitalopram (LEXAPRO) 10 MG tablet, Take 1 tablet by mouth Daily., Disp: , Rfl:     fenofibrate (TRICOR) 145 MG tablet, Take 1 tablet by mouth As Needed., Disp: , Rfl:     ferrous sulfate 325 (65 FE) MG tablet, Take 1 tablet by mouth As Needed., Disp: , Rfl:     fluticasone (FLONASE) 50 MCG/ACT nasal spray, 1 spray by Each Nare route As Needed. As needed, Disp: , Rfl:     gabapentin (NEURONTIN) 100 MG capsule, Take 1 capsule by mouth 3 (Three) Times a Day As Needed., Disp: , Rfl:     glipizide (GLUCOTROL XL) 2.5 MG 24 hr tablet, Take 1 tablet by mouth Every Night., Disp: , Rfl:     HYDROcodone-acetaminophen (NORCO) 7.5-325 MG per tablet, Take 1 tablet by mouth Every 8 (Eight) Hours As Needed for Moderate Pain., Disp: 90 tablet, Rfl: 0    [START ON 8/28/2024] HYDROcodone-acetaminophen (NORCO) 7.5-325 MG per tablet, Take 1 tablet by mouth Every 8 (Eight) Hours As Needed for Moderate Pain., Disp: 90 tablet, Rfl: 0    [START ON 9/27/2024] HYDROcodone-acetaminophen (NORCO) 7.5-325 MG per tablet, Take 1 tablet by  mouth Every 8 (Eight) Hours As Needed for Moderate Pain., Disp: 90 tablet, Rfl: 0    Insulin Lispro Prot & Lispro (humaLOG 75-25) (75-25) 100 UNIT/ML suspension pen-injector pen, INJECT 12 UNITS DAILY BEFORE BREAKFAST AND BEFORE DINNER., Disp: , Rfl:     Lancets (OneTouch Delica Plus [id number]) misc, 1 each Daily. Dx: E11.90, Disp: 100 each, Rfl: 2    levothyroxine (SYNTHROID, LEVOTHROID) 100 MCG tablet, TAKE 1 (ONE) TABLET DAILY WITH 200MCG TO MAKE 300 MCG, Disp: , Rfl:     lisinopril (PRINIVIL,ZESTRIL) 5 MG tablet, Take 1 tablet by mouth Daily., Disp: , Rfl:     montelukast (SINGULAIR) 10 MG tablet, , Disp: , Rfl:     nebivolol (BYSTOLIC) 10 MG tablet, Take 1 tablet by mouth Daily., Disp: , Rfl:     omeprazole (priLOSEC) 40 MG capsule, Take 1 capsule by mouth Daily., Disp: , Rfl:     OneTouch Verio test strip, 1 each by Other route Daily. Dx: E11.90 Use as instructed, Disp: 50 each, Rfl: 2    oxybutynin XL (DITROPAN-XL) 5 MG 24 hr tablet, Take 1 tablet by mouth Daily., Disp: , Rfl:     polyethylene glycol (MIRALAX) 17 GM/SCOOP powder, Take 17 g by mouth Daily As Needed (Use if senna-docusate is ineffective)., Disp: 510 g, Rfl: 0    potassium chloride 10 MEQ CR tablet, Take 1 tablet by mouth 2 (Two) Times a Day., Disp: 15 tablet, Rfl: 0    promethazine (PHENERGAN) 25 MG tablet, Take 1 tablet by mouth As Needed., Disp: , Rfl:     Rybelsus 7 MG tablet, Take 7 mg by mouth Daily., Disp: , Rfl:     sennosides-docusate (PERICOLACE) 8.6-50 MG per tablet, Take 2 tablets by mouth 2 (Two) Times a Day As Needed for Constipation., Disp: 60 tablet, Rfl: 0    sucralfate (CARAFATE) 1 g tablet, Take 1 tablet by mouth Daily., Disp: , Rfl:     triamcinolone (KENALOG) 0.5 % cream, 1 (ONE) APPLICATION TO AFFECTED AREAS TWICE DAILY, Disp: , Rfl:     promethazine-dextromethorphan (PROMETHAZINE-DM) 6.25-15 MG/5ML syrup, TAKE 5 MILLILITER BY MOUTH EVERY 6 HOURS AS NEEDED FOR COUGH (Patient not taking: Reported on 7/29/2024), Disp: , Rfl:      Review of Systems   Musculoskeletal:  Positive for arthralgias and back pain.        Bilateral shoulder pain    Left arm swelling       Vitals:    07/29/24 0802   BP: 172/97   BP Location: Right arm   Patient Position: Sitting   Cuff Size: Adult   Pulse: 68   Resp: 16   SpO2: 96%   Weight: 75.3 kg (166 lb)   PainSc:   5       Urine Drug Screen: 1/22/2024  Appropriate: Yes    Objective   Physical Exam  Vitals and nursing note reviewed.   Constitutional:       General: She is not in acute distress.     Appearance: Normal appearance.   Musculoskeletal:      Comments: Left arm:  1.  Diffusely swollen to the level of the left shoulder.  2.  Left shoulder range of motion significantly decreased secondary to pain  3.  Left shoulder joint nontender to palpation.    Right arm:  1.  Nontender to palpation.  No swelling.  2.  Right shoulder tender to palpation across the supraspinatus tendon  3.  Extremely decreased range of motion in all directions both passive and active secondary to pain     Lumbar spine exam:  1.  Facet loading equivocal bilaterally  2.  Facets weakly tender to palpation bilaterally  3.  Straight leg raise weakly positive bilaterally  4.  Lumbar range of motion decreased secondary to pain.   Neurological:      Mental Status: She is alert.      Sensory: No sensory deficit.      Motor: No weakness.           Assessment & Plan   Problems Addressed this Visit       Osteoarthritis (Chronic)    Relevant Medications    HYDROcodone-acetaminophen (NORCO) 7.5-325 MG per tablet    HYDROcodone-acetaminophen (NORCO) 7.5-325 MG per tablet (Start on 8/28/2024)    HYDROcodone-acetaminophen (NORCO) 7.5-325 MG per tablet (Start on 9/27/2024)     Other Visit Diagnoses       Lumbar radiculopathy        Relevant Medications    HYDROcodone-acetaminophen (NORCO) 7.5-325 MG per tablet    HYDROcodone-acetaminophen (NORCO) 7.5-325 MG per tablet (Start on 8/28/2024)    HYDROcodone-acetaminophen (NORCO) 7.5-325 MG per tablet  (Start on 9/27/2024)          Diagnoses         Codes Comments    Lumbar radiculopathy     ICD-10-CM: M54.16  ICD-9-CM: 724.4     Osteoarthritis of both shoulders, unspecified osteoarthritis type     ICD-10-CM: M19.011, M19.012  ICD-9-CM: 715.91             Plan:  1.  Lumbar MRI with very mild degenerative change  2.  Refill hydrocodone  3.  UDS and inspect appropriate    --- Follow-up 3 months           INSPECT REPORT    As part of the patient's treatment plan, I may be prescribing controlled substances. The patient has been made aware of appropriate use of such medications, including potential risk of somnolence, limited ability to drive and/or work safely, and the potential for dependence or overdose. It has also bee made clear that these medications are for use by this patient only, without concomitant use of alcohol or other substances unless prescribed.     Patient has completed prescribing agreement detailing terms of continued prescribing of controlled substances, including monitoring JACKIE reports, urine drug screening, and pill counts if necessary. The patient is aware that inappropriate use will results in cessation of prescribing such medications.    INSPECT report has been reviewed and scanned into the patient's chart.    As the clinician, I personally reviewed the INSPECT from 7/26/2024.    History and physical exam exhibit continued safe and appropriate use of controlled substances.      EMR Dragon/Transcription disclaimer:   Much of this encounter note is an electronic transcription/translation of spoken language to printed text. The electronic translation of spoken language may permit erroneous, or at times, nonsensical words or phrases to be inadvertently transcribed; Although I have reviewed the note for such errors, some may still exist.

## 2024-10-30 ENCOUNTER — OFFICE VISIT (OUTPATIENT)
Dept: CARDIOLOGY | Facility: CLINIC | Age: 64
End: 2024-10-30
Payer: MEDICARE

## 2024-10-30 ENCOUNTER — CLINICAL SUPPORT NO REQUIREMENTS (OUTPATIENT)
Dept: CARDIOLOGY | Facility: CLINIC | Age: 64
End: 2024-10-30
Payer: MEDICARE

## 2024-10-30 VITALS
HEIGHT: 64 IN | SYSTOLIC BLOOD PRESSURE: 180 MMHG | WEIGHT: 165.8 LBS | HEART RATE: 68 BPM | OXYGEN SATURATION: 99 % | DIASTOLIC BLOOD PRESSURE: 97 MMHG | BODY MASS INDEX: 28.31 KG/M2

## 2024-10-30 DIAGNOSIS — I49.5 SICK SINUS SYNDROME: Primary | ICD-10-CM

## 2024-10-30 DIAGNOSIS — I10 PRIMARY HYPERTENSION: Chronic | ICD-10-CM

## 2024-10-30 DIAGNOSIS — I49.5 SICK SINUS SYNDROME: ICD-10-CM

## 2024-10-30 DIAGNOSIS — Z95.0 PACEMAKER: Primary | ICD-10-CM

## 2024-10-30 PROCEDURE — 99213 OFFICE O/P EST LOW 20 MIN: CPT | Performed by: INTERNAL MEDICINE

## 2024-10-30 PROCEDURE — 3077F SYST BP >= 140 MM HG: CPT | Performed by: INTERNAL MEDICINE

## 2024-10-30 PROCEDURE — 3080F DIAST BP >= 90 MM HG: CPT | Performed by: INTERNAL MEDICINE

## 2024-10-30 PROCEDURE — 1160F RVW MEDS BY RX/DR IN RCRD: CPT | Performed by: INTERNAL MEDICINE

## 2024-10-30 PROCEDURE — 1159F MED LIST DOCD IN RCRD: CPT | Performed by: INTERNAL MEDICINE

## 2024-10-30 RX ORDER — NITROGLYCERIN 0.4 MG/1
TABLET SUBLINGUAL
Qty: 100 TABLET | Refills: 11 | Status: SHIPPED | OUTPATIENT
Start: 2024-10-30

## 2024-10-30 NOTE — PROGRESS NOTES
Progress note      Name: Shantel Maharaj ADMIT: (Not on file)   : 1960  PCP: Gris Dominguez MD    MRN: [id number] LOS: 0 days   AGE/SEX: 64 y.o. female  ROOM: Room/bed info not found     Chief Complaint   Patient presents with    Follow-up     With device       Subjective       History of present illness  Shantel Maharaj is a 64-year-old female patient's who has hypertension, dyslipidemia, diabetes, history of breast cancer with bilateral mastectomy, chronically swollen left arm due to lymphedema, sick sinus syndrome status post dual-chamber pacemaker implantation on 2022 right-sided, here today for follow-up.   Patient has been doing well, she does have occasional chest discomfort not necessarily associated with exertion.  No recent hospitalizations.    Past Medical History:   Diagnosis Date    Arm pain     Breast cancer     Cancer     lt breast -- reocc rt breast 2020    Chronic pain disorder     Colorectal polyp detected on colonoscopy 10/06/2023    COVID-19     Diabetes     Drug therapy     High cholesterol     Hx of radiation therapy     Hypertension     Joint pain     Low back pain     Neuropathy in diabetes     Osteoarthritis     Osteopenia     Thyroid disease      Past Surgical History:   Procedure Laterality Date    APPENDECTOMY      BREAST LUMPECTOMY      2021-- [facility]-- cancer-- reocc    BREAST SURGERY      removal of left breast     CARDIAC ELECTROPHYSIOLOGY PROCEDURE N/A 2022    Procedure: Pacemaker DC new-[place];  Surgeon: Ramsey Dinero MD;  Location: [facility] INVASIVE LOCATION;  Service: Cardiology;  Laterality: N/A;     SECTION      x2    GALLBLADDER SURGERY      INSERT / REPLACE / REMOVE PACEMAKER      KNEE SURGERY      left knee - petella broken     OTHER SURGICAL HISTORY      rt arm surgery with shlomo  placement rt arm    SHOULDER SURGERY      broken-lt- in 12 places    WRIST SURGERY      rt - broken     Family History   Problem Relation Age of Onset     Cancer Mother     Breast cancer Mother     Heart disease Father         [place]    Hypertension Father     Breast cancer Maternal Aunt     Breast cancer Maternal Aunt     Breast cancer Maternal Aunt      Social History     Tobacco Use    Smoking status: Former     Current packs/day: 1.00     Types: Cigarettes     Passive exposure: Past    Smokeless tobacco: Never    Tobacco comments:     quit  smoking in 2008   Vaping Use    Vaping status: Never Used   Substance Use Topics    Alcohol use: Never    Drug use: Never       Current Outpatient Medications:     amLODIPine (NORVASC) 10 MG tablet, Take 1 tablet by mouth Daily., Disp: , Rfl:     anastrozole (ARIMIDEX) 1 MG tablet, Take 1 tablet by mouth Daily., Disp: , Rfl:     atorvastatin (LIPITOR) 20 MG tablet, TAKE 1 TABLET BY MOUTH EVERY DAY AT NIGHT, Disp: 90 tablet, Rfl: 3    B-D UF III MINI PEN NEEDLES 31G X 5 MM misc, USE 1 (ONE) NEEDLE TWO TIMES DAILY, Disp: , Rfl:     Calcium 600/Vitamin D 600-400 MG-UNIT chewable tablet, Chew 1 tablet 2 (Two) Times a Day., Disp: , Rfl:     Continuous Glucose  (Dexcom G7 ) device, , Disp: , Rfl:     Continuous Glucose Sensor (FreeStyle Tali 3 Sensor) [facility] [place], CHANGE SENSOR EVERY 14 DAYS, Disp: , Rfl:     cyanocobalamin 1000 MCG/ML injection, INJECT 1ML ONCE MONTHLY, Disp: , Rfl:     cyclobenzaprine (FLEXERIL) 10 MG tablet, Take 1 tablet by mouth 3 (Three) Times a Day As Needed for Muscle Spasms., Disp: 90 tablet, Rfl: 2    dexlansoprazole (DEXILANT) 60 MG capsule, Take 1 capsule by mouth Every Night., Disp: , Rfl:     escitalopram (LEXAPRO) 10 MG tablet, Take 1 tablet by mouth Daily., Disp: , Rfl:     fenofibrate (TRICOR) 145 MG tablet, Take 1 tablet by mouth As Needed., Disp: , Rfl:     ferrous sulfate 325 (65 FE) MG tablet, Take 1 tablet by mouth As Needed., Disp: , Rfl:     fluticasone (FLONASE) 50 MCG/ACT nasal spray, 1 spray by Each Nare route As Needed. As needed, Disp: , Rfl:     gabapentin (NEURONTIN) 100 MG  capsule, Take 1 capsule by mouth 3 (Three) Times a Day As Needed., Disp: , Rfl:     glipizide (GLUCOTROL XL) 2.5 MG 24 hr tablet, Take 1 tablet by mouth Every Night., Disp: , Rfl:     HYDROcodone-acetaminophen (NORCO) 7.5-325 MG per tablet, Take 1 tablet by mouth Every 8 (Eight) Hours As Needed for Moderate Pain., Disp: 90 tablet, Rfl: 0    HYDROcodone-acetaminophen (NORCO) 7.5-325 MG per tablet, Take 1 tablet by mouth Every 8 (Eight) Hours As Needed for Moderate Pain., Disp: 90 tablet, Rfl: 0    HYDROcodone-acetaminophen (NORCO) 7.5-325 MG per tablet, Take 1 tablet by mouth Every 8 (Eight) Hours As Needed for Moderate Pain., Disp: 90 tablet, Rfl: 0    Insulin Lispro Prot & Lispro (humaLOG 75-25) (75-25) 100 UNIT/ML suspension pen-injector pen, INJECT 12 UNITS DAILY BEFORE BREAKFAST AND BEFORE DINNER., Disp: , Rfl:     Lancets (OneTouch Delica Plus [id number]) misc, 1 each Daily. Dx: E11.90, Disp: 100 each, Rfl: 2    levothyroxine (SYNTHROID, LEVOTHROID) 100 MCG tablet, TAKE 1 (ONE) TABLET DAILY WITH 200MCG TO MAKE 300 MCG, Disp: , Rfl:     lisinopril (PRINIVIL,ZESTRIL) 5 MG tablet, Take 1 tablet by mouth Daily., Disp: , Rfl:     montelukast (SINGULAIR) 10 MG tablet, , Disp: , Rfl:     nebivolol (BYSTOLIC) 10 MG tablet, Take 1 tablet by mouth Daily., Disp: , Rfl:     omeprazole (priLOSEC) 40 MG capsule, Take 1 capsule by mouth Daily., Disp: , Rfl:     OneTouch Verio test strip, 1 each by Other route Daily. Dx: E11.90 Use as instructed, Disp: 50 each, Rfl: 2    oxybutynin XL (DITROPAN-XL) 5 MG 24 hr tablet, Take 1 tablet by mouth Daily., Disp: , Rfl:     polyethylene glycol (MIRALAX) 17 GM/SCOOP powder, Take 17 g by mouth Daily As Needed (Use if senna-docusate is ineffective)., Disp: 510 g, Rfl: 0    potassium chloride 10 MEQ CR tablet, Take 1 tablet by mouth 2 (Two) Times a Day., Disp: 15 tablet, Rfl: 0    promethazine (PHENERGAN) 25 MG tablet, Take 1 tablet by mouth As Needed., Disp: , Rfl:      promethazine-dextromethorphan (PROMETHAZINE-DM) 6.25-15 MG/5ML syrup, , Disp: , Rfl:     Rybelsus 7 MG tablet, Take 7 mg by mouth Daily., Disp: , Rfl:     sennosides-docusate (PERICOLACE) 8.6-50 MG per tablet, Take 2 tablets by mouth 2 (Two) Times a Day As Needed for Constipation., Disp: 60 tablet, Rfl: 0    sucralfate (CARAFATE) 1 g tablet, Take 1 tablet by mouth Daily., Disp: , Rfl:     triamcinolone (KENALOG) 0.5 % cream, 1 (ONE) APPLICATION TO AFFECTED AREAS TWICE DAILY, Disp: , Rfl:     nitroglycerin (NITROSTAT) 0.4 MG SL tablet, 1 under the tongue as needed for angina, may repeat q5mins for up three doses, Disp: 100 tablet, Rfl: 11  Allergies:  Valsartan      Physical Exam  VITALS REVIEWED    General:      well developed, in no acute distress.    Head:      normocephalic and atraumatic.    Eyes:      PERRL/EOM intact, conjunctiva and sclera clear with out nystagmus.    Neck:      no masses, thyromegaly,  trachea central with normal respiratory effort and PMI displaced laterally  Lungs:      Clear to auscultation bilaterally  Heart:       Regular rate and rhythm, no murmur  Msk:      no deformity or scoliosis noted of thoracic or lumbar spine.    Pulses:      pulses normal in all 4 extremities.    Extremities:       No lower extremity edema  Neurologic:      no focal deficits.   alert oriented x3  Skin:      intact without lesions or rashes.    Psych:      alert and cooperative; normal mood and affect; normal attention span and concentration.      Result Review :               Pertinent cardiac workup    Echo 2/22/2022 ejection fraction 60 to 65%  Stress test 10/4/2022 low risk        Procedures        Assessment and Plan      Shantel John a 64-year-old female patient who has sick sinus syndrome, dual-chamber pacemaker here for follow-up, she also has hypertension. Device check today showed appropriate function, no arrhythmias. Her blood pressure is elevated today but she is on 3 antihypertensives, her  blood pressures at home better.  I offered to perform ischemic workup due to her chest discomfort but at this time she said that is not that bad and would like to hold off on it.  I will go ahead and send her nitroglycerin and advised her to call me if her chest pains get worse.  Otherwise we will see her in 6 months for follow-up.    Diagnoses and all orders for this visit:    1. Sick sinus syndrome (Primary)  Overview:  Added automatically from request for surgery [id number]      2. Primary hypertension    Other orders  -     nitroglycerin (NITROSTAT) 0.4 MG SL tablet; 1 under the tongue as needed for angina, may repeat q5mins for up three doses  Dispense: 100 tablet; Refill: 11           Return in about 6 months (around 4/30/2025).  Patient was given instructions and counseling regarding her condition or for health maintenance advice. Please see specific information pulled into the AVS if appropriate.       Electronically signed by Ramsey Dinero MD, 10/30/24, 12:25 PM EDT.

## 2024-11-11 ENCOUNTER — OFFICE VISIT (OUTPATIENT)
Dept: PAIN MEDICINE | Facility: CLINIC | Age: 64
End: 2024-11-11
Payer: MEDICARE

## 2024-11-11 VITALS
RESPIRATION RATE: 16 BRPM | DIASTOLIC BLOOD PRESSURE: 91 MMHG | HEART RATE: 61 BPM | BODY MASS INDEX: 28.49 KG/M2 | OXYGEN SATURATION: 98 % | WEIGHT: 166 LBS | SYSTOLIC BLOOD PRESSURE: 155 MMHG

## 2024-11-11 DIAGNOSIS — M19.012 OSTEOARTHRITIS OF BOTH SHOULDERS, UNSPECIFIED OSTEOARTHRITIS TYPE: ICD-10-CM

## 2024-11-11 DIAGNOSIS — M54.16 LUMBAR RADICULOPATHY: ICD-10-CM

## 2024-11-11 DIAGNOSIS — Z79.899 HIGH RISK MEDICATION USE: Primary | ICD-10-CM

## 2024-11-11 DIAGNOSIS — M19.011 OSTEOARTHRITIS OF BOTH SHOULDERS, UNSPECIFIED OSTEOARTHRITIS TYPE: ICD-10-CM

## 2024-11-11 RX ORDER — HYDROCODONE BITARTRATE AND ACETAMINOPHEN 7.5; 325 MG/1; MG/1
1 TABLET ORAL EVERY 8 HOURS PRN
Qty: 90 TABLET | Refills: 0 | Status: SHIPPED | OUTPATIENT
Start: 2024-12-20

## 2024-11-11 RX ORDER — HYDROCODONE BITARTRATE AND ACETAMINOPHEN 7.5; 325 MG/1; MG/1
1 TABLET ORAL EVERY 8 HOURS PRN
Qty: 90 TABLET | Refills: 0 | Status: SHIPPED | OUTPATIENT
Start: 2024-11-22

## 2024-11-11 RX ORDER — HYDROCODONE BITARTRATE AND ACETAMINOPHEN 7.5; 325 MG/1; MG/1
1 TABLET ORAL EVERY 8 HOURS PRN
Qty: 90 TABLET | Refills: 0 | Status: SHIPPED | OUTPATIENT
Start: 2025-01-17

## 2024-11-11 NOTE — PROGRESS NOTES
CHIEF COMPLAINT  Chief Complaint   Patient presents with    Follow-up     Hydrocodone LD    Arm Pain     Left    Knee Pain     both       Primary Care  Gris Dominguez MD    Subjective   Shantel Maharaj is a 64 y.o. female  who presents for follow-up.She states she has had longstanding low back pain and also developed bilateral shoulder and left arm pain following a mastectomy and lymph node stripping.  She was previously being evaluated by an outside pain clinic who is providing her narcotic pain medication as well as injections, but she does not know the type of injection she was receiving.  She states that additionally, she is required to attend chiropractic sessions which she thought were exacerbating her pain.  Given this, she wanted to transition care to [facility] [place] [place] by [facility].    Osteoarthritis    Back Pain  Associated symptoms include leg pain.   Arm Pain     Knee Pain     Leg Pain     Follow-upAssociated symptoms include: leg pain.        Location: Low back without radiation, bilateral shoulders, left arm Onset: Many years ago  Duration: Constant, slowly progressing  Timing: Constant throughout the day  Quality: The pain in the shoulder is a sharp, stabbing pain.  The low back as an aching, cramping pain  Severity: Today: 6       Last Week: 6       Worst: 8  Modifying Factors: The pain is worse with walking and physical activity.  The pain is slightly improved with lying flat as well as medication and compression on the left arm     Physical Therapy: yes    Interval Update 11/11/2024: Continues with hydrocodone 3 times daily.  Reports that she is pending knee surgery however her A1c is too high    The following portions of the patient's history were reviewed and updated as appropriate: allergies, current medications, past family history, past medical history, past social history, past surgical history and problem list.      Current Outpatient Medications:     amLODIPine (NORVASC) 10 MG tablet, Take 1 tablet by  mouth Daily., Disp: , Rfl:     anastrozole (ARIMIDEX) 1 MG tablet, Take 1 tablet by mouth Daily., Disp: , Rfl:     atorvastatin (LIPITOR) 20 MG tablet, TAKE 1 TABLET BY MOUTH EVERY DAY AT NIGHT, Disp: 90 tablet, Rfl: 3    B-D UF III MINI PEN NEEDLES 31G X 5 MM misc, USE 1 (ONE) NEEDLE TWO TIMES DAILY, Disp: , Rfl:     Calcium 600/Vitamin D 600-400 MG-UNIT chewable tablet, Chew 1 tablet 2 (Two) Times a Day., Disp: , Rfl:     Continuous Glucose  (Dexcom G7 ) device, , Disp: , Rfl:     Continuous Glucose Sensor (FreeStyle Tali 3 Sensor) [facility] [place], CHANGE SENSOR EVERY 14 DAYS, Disp: , Rfl:     cyanocobalamin 1000 MCG/ML injection, INJECT 1ML ONCE MONTHLY, Disp: , Rfl:     cyclobenzaprine (FLEXERIL) 10 MG tablet, Take 1 tablet by mouth 3 (Three) Times a Day As Needed for Muscle Spasms., Disp: 90 tablet, Rfl: 2    dexlansoprazole (DEXILANT) 60 MG capsule, Take 1 capsule by mouth Every Night., Disp: , Rfl:     escitalopram (LEXAPRO) 10 MG tablet, Take 1 tablet by mouth Daily., Disp: , Rfl:     fenofibrate (TRICOR) 145 MG tablet, Take 1 tablet by mouth As Needed., Disp: , Rfl:     ferrous sulfate 325 (65 FE) MG tablet, Take 1 tablet by mouth As Needed., Disp: , Rfl:     fluticasone (FLONASE) 50 MCG/ACT nasal spray, 1 spray by Each Nare route As Needed. As needed, Disp: , Rfl:     gabapentin (NEURONTIN) 100 MG capsule, Take 1 capsule by mouth 3 (Three) Times a Day As Needed., Disp: , Rfl:     glipizide (GLUCOTROL XL) 2.5 MG 24 hr tablet, Take 1 tablet by mouth Every Night., Disp: , Rfl:     [START ON 11/22/2024] HYDROcodone-acetaminophen (NORCO) 7.5-325 MG per tablet, Take 1 tablet by mouth Every 8 (Eight) Hours As Needed for Moderate Pain., Disp: 90 tablet, Rfl: 0    [START ON 12/20/2024] HYDROcodone-acetaminophen (NORCO) 7.5-325 MG per tablet, Take 1 tablet by mouth Every 8 (Eight) Hours As Needed for Moderate Pain., Disp: 90 tablet, Rfl: 0    [START ON 1/17/2025] HYDROcodone-acetaminophen (NORCO) 7.5-325 MG  per tablet, Take 1 tablet by mouth Every 8 (Eight) Hours As Needed for Moderate Pain., Disp: 90 tablet, Rfl: 0    Insulin Lispro Prot & Lispro (humaLOG 75-25) (75-25) 100 UNIT/ML suspension pen-injector pen, INJECT 12 UNITS DAILY BEFORE BREAKFAST AND BEFORE DINNER., Disp: , Rfl:     Lancets (OneTouch Delica Plus [id number]) misc, 1 each Daily. Dx: E11.90, Disp: 100 each, Rfl: 2    levothyroxine (SYNTHROID, LEVOTHROID) 100 MCG tablet, TAKE 1 (ONE) TABLET DAILY WITH 200MCG TO MAKE 300 MCG, Disp: , Rfl:     lisinopril (PRINIVIL,ZESTRIL) 5 MG tablet, Take 1 tablet by mouth Daily., Disp: , Rfl:     montelukast (SINGULAIR) 10 MG tablet, , Disp: , Rfl:     nebivolol (BYSTOLIC) 10 MG tablet, Take 1 tablet by mouth Daily., Disp: , Rfl:     nitroglycerin (NITROSTAT) 0.4 MG SL tablet, 1 under the tongue as needed for angina, may repeat q5mins for up three doses, Disp: 100 tablet, Rfl: 11    omeprazole (priLOSEC) 40 MG capsule, Take 1 capsule by mouth Daily., Disp: , Rfl:     OneTouch Verio test strip, 1 each by Other route Daily. Dx: E11.90 Use as instructed, Disp: 50 each, Rfl: 2    oxybutynin XL (DITROPAN-XL) 5 MG 24 hr tablet, Take 1 tablet by mouth Daily., Disp: , Rfl:     polyethylene glycol (MIRALAX) 17 GM/SCOOP powder, Take 17 g by mouth Daily As Needed (Use if senna-docusate is ineffective)., Disp: 510 g, Rfl: 0    potassium chloride 10 MEQ CR tablet, Take 1 tablet by mouth 2 (Two) Times a Day., Disp: 15 tablet, Rfl: 0    promethazine (PHENERGAN) 25 MG tablet, Take 1 tablet by mouth As Needed., Disp: , Rfl:     promethazine-dextromethorphan (PROMETHAZINE-DM) 6.25-15 MG/5ML syrup, , Disp: , Rfl:     Rybelsus 7 MG tablet, Take 7 mg by mouth Daily., Disp: , Rfl:     sennosides-docusate (PERICOLACE) 8.6-50 MG per tablet, Take 2 tablets by mouth 2 (Two) Times a Day As Needed for Constipation., Disp: 60 tablet, Rfl: 0    sucralfate (CARAFATE) 1 g tablet, Take 1 tablet by mouth Daily., Disp: , Rfl:     triamcinolone (KENALOG)  0.5 % cream, 1 (ONE) APPLICATION TO AFFECTED AREAS TWICE DAILY, Disp: , Rfl:     Review of Systems   Musculoskeletal:  Positive for arthralgias and back pain.        Bilateral shoulder pain    Left arm swelling       Vitals:    11/11/24 0914   BP: 155/91   Pulse: 61   Resp: 16   SpO2: 98%   Weight: 75.3 kg (166 lb)   PainSc:   7       Urine Drug Screen: 11/11/2024   Appropriate: Pending    Objective   Physical Exam  Vitals and nursing note reviewed.   Constitutional:       General: She is not in acute distress.     Appearance: Normal appearance.   Musculoskeletal:      Comments: Left arm:  1.  Diffusely swollen to the level of the left shoulder.  2.  Left shoulder range of motion significantly decreased secondary to pain  3.  Left shoulder joint nontender to palpation.    Right arm:  1.  Nontender to palpation.  No swelling.  2.  Right shoulder tender to palpation across the supraspinatus tendon  3.  Extremely decreased range of motion in all directions both passive and active secondary to pain     Lumbar spine exam:  1.  Facet loading equivocal bilaterally  2.  Facets weakly tender to palpation bilaterally  3.  Straight leg raise weakly positive bilaterally  4.  Lumbar range of motion decreased secondary to pain.   Neurological:      Mental Status: She is alert.      Sensory: No sensory deficit.      Motor: No weakness.           Assessment & Plan   Problems Addressed this Visit       Osteoarthritis (Chronic)    Relevant Medications    HYDROcodone-acetaminophen (NORCO) 7.5-325 MG per tablet (Start on 11/22/2024)    HYDROcodone-acetaminophen (NORCO) 7.5-325 MG per tablet (Start on 12/20/2024)    HYDROcodone-acetaminophen (NORCO) 7.5-325 MG per tablet (Start on 1/17/2025)     Other Visit Diagnoses       Lumbar radiculopathy        Relevant Medications    HYDROcodone-acetaminophen (NORCO) 7.5-325 MG per tablet (Start on 11/22/2024)    HYDROcodone-acetaminophen (NORCO) 7.5-325 MG per tablet (Start on 12/20/2024)     HYDROcodone-acetaminophen (NORCO) 7.5-325 MG per tablet (Start on 1/17/2025)          Diagnoses         Codes Comments    Lumbar radiculopathy     ICD-10-CM: M54.16  ICD-9-CM: 724.4     Osteoarthritis of both shoulders, unspecified osteoarthritis type     ICD-10-CM: M19.011, M19.012  ICD-9-CM: 715.91             Plan:  1.  Lumbar MRI with very mild degenerative change  2.  Refill hydrocodone  3.  Repeat UDS today.  Inspect appropriate    --- Follow-up 3 months           INSPECT REPORT    As part of the patient's treatment plan, I may be prescribing controlled substances. The patient has been made aware of appropriate use of such medications, including potential risk of somnolence, limited ability to drive and/or work safely, and the potential for dependence or overdose. It has also bee made clear that these medications are for use by this patient only, without concomitant use of alcohol or other substances unless prescribed.     Patient has completed prescribing agreement detailing terms of continued prescribing of controlled substances, including monitoring JACKIE reports, urine drug screening, and pill counts if necessary. The patient is aware that inappropriate use will results in cessation of prescribing such medications.    INSPECT report has been reviewed and scanned into the patient's chart.    As the clinician, I personally reviewed the INSPECT from 11/8/2024.    History and physical exam exhibit continued safe and appropriate use of controlled substances.      EMR Dragon/Transcription disclaimer:   Much of this encounter note is an electronic transcription/translation of spoken language to printed text. The electronic translation of spoken language may permit erroneous, or at times, nonsensical words or phrases to be inadvertently transcribed; Although I have reviewed the note for such errors, some may still exist.

## 2024-12-16 NOTE — PROGRESS NOTES
----- Message from Med Assistant Hamilton sent at 12/16/2024  2:35 PM CST -----  [phone] [facility]      Please call to discuss pt needing a follow up for her nephrostomy tubes.   CHIEF COMPLAINT  Chief Complaint   Patient presents with    Back Pain     3 month f/u  CC  Lumbar pain Treated w/Hydrocodone LD @ 4 am 01/22       Primary Care  Gris Dominguez MD    Subjective   Shantel Maharaj is a 63 y.o. female  who presents for follow-up.She states she has had longstanding low back pain and also developed bilateral shoulder and left arm pain following a mastectomy and lymph node stripping.  She was previously being evaluated by an outside pain clinic who is providing her narcotic pain medication as well as injections, but she does not know the type of injection she was receiving.  She states that additionally, she is required to attend chiropractic sessions which she thought were exacerbating her pain.  Given this, she wanted to transition care to [facility].    Osteoarthritis  Her past medical history is significant for osteoarthritis.   Back Pain  Associated symptoms include leg pain.   Arm Pain     Knee Pain     Leg Pain          Location: Low back without radiation, bilateral shoulders, left arm Onset: Many years ago  Duration: Constant, slowly progressing  Timing: Constant throughout the day  Quality: The pain in the shoulder is a sharp, stabbing pain.  The low back as an aching, cramping pain  Severity: Today: 6       Last Week: 6       Worst: 8  Modifying Factors: The pain is worse with walking and physical activity.  The pain is slightly improved with lying flat as well as medication and compression on the left arm     Physical Therapy: yes    Interval Update 01/22/2024: Unchanged.  Continues to get minimal benefit with hydrocodone 3 times daily.  Complaining of bilateral knee pain and stating she needs knee replacements.  Still has not gotten an MRI of her low back for radiculopathy    The following portions of the patient's history were reviewed and updated as appropriate: allergies, current medications, past family history, past medical history, past social history, past surgical  history and problem list.      Current Outpatient Medications:     amLODIPine (NORVASC) 10 MG tablet, Take 1 tablet by mouth Daily., Disp: , Rfl:     anastrozole (ARIMIDEX) 1 MG tablet, Take 1 tablet by mouth Daily., Disp: , Rfl:     atorvastatin (LIPITOR) 20 MG tablet, TAKE 1 TABLET BY MOUTH EVERY DAY AT NIGHT, Disp: 90 tablet, Rfl: 3    Calcium 600/Vitamin D 600-400 MG-UNIT chewable tablet, Chew 1 tablet 2 (Two) Times a Day., Disp: , Rfl:     cyanocobalamin 1000 MCG/ML injection, INJECT 1ML ONCE MONTHLY, Disp: , Rfl:     cyclobenzaprine (FLEXERIL) 10 MG tablet, Take 1 tablet by mouth 3 (Three) Times a Day As Needed for Muscle Spasms., Disp: 90 tablet, Rfl: 2    dexlansoprazole (DEXILANT) 60 MG capsule, Take 1 capsule by mouth Every Night., Disp: , Rfl:     escitalopram (LEXAPRO) 10 MG tablet, Take 1 tablet by mouth Daily., Disp: , Rfl:     fenofibrate (TRICOR) 145 MG tablet, Take 1 tablet by mouth As Needed., Disp: , Rfl:     ferrous sulfate 325 (65 FE) MG tablet, Take 1 tablet by mouth As Needed., Disp: , Rfl:     fluticasone (FLONASE) 50 MCG/ACT nasal spray, 1 spray by Each Nare route As Needed. As needed, Disp: , Rfl:     gabapentin (NEURONTIN) 100 MG capsule, Take 1 capsule by mouth 3 (Three) Times a Day As Needed., Disp: , Rfl:     glipizide (GLUCOTROL XL) 2.5 MG 24 hr tablet, Take 1 tablet by mouth Every Night., Disp: , Rfl:     [START ON 4/8/2024] HYDROcodone-acetaminophen (NORCO) 7.5-325 MG per tablet, Take 1 tablet by mouth Every 8 (Eight) Hours As Needed for Moderate Pain., Disp: 90 tablet, Rfl: 0    [START ON 3/9/2024] HYDROcodone-acetaminophen (NORCO) 7.5-325 MG per tablet, Take 1 tablet by mouth Every 8 (Eight) Hours As Needed for Moderate Pain., Disp: 90 tablet, Rfl: 0    [START ON 2/10/2024] HYDROcodone-acetaminophen (NORCO) 7.5-325 MG per tablet, Take 1 tablet by mouth Every 8 (Eight) Hours As Needed for Moderate Pain., Disp: 90 tablet, Rfl: 0    Lancets (OneTouch Delica Plus [id number]) misc, 1  each Daily. Dx: E11.90, Disp: 100 each, Rfl: 2    levothyroxine (SYNTHROID, LEVOTHROID) 100 MCG tablet, TAKE 1 (ONE) TABLET DAILY WITH 200MCG TO MAKE 300 MCG, Disp: , Rfl:     lisinopril (PRINIVIL,ZESTRIL) 5 MG tablet, Take 1 tablet by mouth Daily., Disp: , Rfl:     montelukast (SINGULAIR) 10 MG tablet, , Disp: , Rfl:     nebivolol (BYSTOLIC) 10 MG tablet, Take 1 tablet by mouth Daily., Disp: , Rfl:     OneTouch Verio test strip, 1 each by Other route Daily. Dx: E11.90 Use as instructed, Disp: 50 each, Rfl: 2    oxybutynin XL (DITROPAN-XL) 5 MG 24 hr tablet, Take 1 tablet by mouth Daily., Disp: , Rfl:     polyethylene glycol (MIRALAX) 17 GM/SCOOP powder, Take 17 g by mouth Daily As Needed (Use if senna-docusate is ineffective)., Disp: 510 g, Rfl: 0    potassium chloride 10 MEQ CR tablet, Take 1 tablet by mouth 2 (Two) Times a Day., Disp: 15 tablet, Rfl: 0    promethazine (PHENERGAN) 25 MG tablet, Take 1 tablet by mouth As Needed., Disp: , Rfl:     promethazine-dextromethorphan (PROMETHAZINE-DM) 6.25-15 MG/5ML syrup, TAKE 5 MILLILITER BY MOUTH EVERY 6 HOURS AS NEEDED FOR COUGH, Disp: , Rfl:     Rybelsus 7 MG tablet, Take 7 mg by mouth Daily., Disp: , Rfl:     sennosides-docusate (PERICOLACE) 8.6-50 MG per tablet, Take 2 tablets by mouth 2 (Two) Times a Day As Needed for Constipation., Disp: 60 tablet, Rfl: 0    sucralfate (CARAFATE) 1 g tablet, Take 1 tablet by mouth Daily., Disp: , Rfl:     triamcinolone (KENALOG) 0.5 % cream, 1 (ONE) APPLICATION TO AFFECTED AREAS TWICE DAILY, Disp: , Rfl:     Review of Systems   Musculoskeletal:  Positive for arthralgias and back pain.        Bilateral shoulder pain    Left arm swelling       Vitals:    01/22/24 0759   BP: 159/82   BP Location: Left arm   Patient Position: Sitting   Cuff Size: Adult   Pulse: 66   Resp: 16   SpO2: 98%   Weight: 66.1 kg (145 lb 12.8 oz)   PainSc:   6   PainLoc: Back       Urine Drug Screen: 1/22/2024  Appropriate: Pending    Objective   Physical  Exam  Vitals and nursing note reviewed.   Constitutional:       General: She is not in acute distress.     Appearance: Normal appearance.   Musculoskeletal:      Comments: Left arm:  1.  Diffusely swollen to the level of the left shoulder.  2.  Left shoulder range of motion significantly decreased secondary to pain  3.  Left shoulder joint nontender to palpation.    Right arm:  1.  Nontender to palpation.  No swelling.  2.  Right shoulder tender to palpation across the supraspinatus tendon  3.  Extremely decreased range of motion in all directions both passive and active secondary to pain     Lumbar spine exam:  1.  Facet loading equivocal bilaterally  2.  Facets weakly tender to palpation bilaterally  3.  Straight leg raise weakly positive bilaterally  4.  Lumbar range of motion decreased secondary to pain.   Neurological:      Mental Status: She is alert.      Sensory: No sensory deficit.      Motor: No weakness.           Assessment & Plan   Problems Addressed this Visit          Other    Osteoarthritis (Chronic)    Relevant Medications    HYDROcodone-acetaminophen (NORCO) 7.5-325 MG per tablet (Start on 4/8/2024)    HYDROcodone-acetaminophen (NORCO) 7.5-325 MG per tablet (Start on 3/9/2024)    HYDROcodone-acetaminophen (NORCO) 7.5-325 MG per tablet (Start on 2/10/2024)     Other Visit Diagnoses       Lumbar radiculopathy        Relevant Medications    HYDROcodone-acetaminophen (NORCO) 7.5-325 MG per tablet (Start on 4/8/2024)    HYDROcodone-acetaminophen (NORCO) 7.5-325 MG per tablet (Start on 3/9/2024)    HYDROcodone-acetaminophen (NORCO) 7.5-325 MG per tablet (Start on 2/10/2024)    Other Relevant Orders    MRI Lumbar Spine Without Contrast          Diagnoses         Codes Comments    Lumbar radiculopathy     ICD-10-CM: M54.16  ICD-9-CM: 724.4     Osteoarthritis of both shoulders, unspecified osteoarthritis type     ICD-10-CM: M19.011, M19.012  ICD-9-CM: 715.91             Plan:  1.  Will put in an order for  her MRI  2.  Refill hydrocodone  3.  Repeat UDS today  4.  Inspect appropriate    --- Follow-up 3 months           INSPECT REPORT    As part of the patient's treatment plan, I may be prescribing controlled substances. The patient has been made aware of appropriate use of such medications, including potential risk of somnolence, limited ability to drive and/or work safely, and the potential for dependence or overdose. It has also bee made clear that these medications are for use by this patient only, without concomitant use of alcohol or other substances unless prescribed.     Patient has completed prescribing agreement detailing terms of continued prescribing of controlled substances, including monitoring JACKIE reports, urine drug screening, and pill counts if necessary. The patient is aware that inappropriate use will results in cessation of prescribing such medications.    INSPECT report has been reviewed and scanned into the patient's chart.    As the clinician, I personally reviewed the INSPECT from 1/19/2024.    History and physical exam exhibit continued safe and appropriate use of controlled substances.      EMR Dragon/Transcription disclaimer:   Much of this encounter note is an electronic transcription/translation of spoken language to printed text. The electronic translation of spoken language may permit erroneous, or at times, nonsensical words or phrases to be inadvertently transcribed; Although I have reviewed the note for such errors, some may still exist.

## 2025-02-25 ENCOUNTER — TELEPHONE (OUTPATIENT)
Dept: PAIN MEDICINE | Facility: CLINIC | Age: 65
End: 2025-02-25
Payer: MEDICARE

## 2025-02-25 NOTE — TELEPHONE ENCOUNTER
SPOKE WITH PATIENT SHE IS GOING TO TRY TO KEEP APPT FOR NOW WILL CALL FIRST THING MONDAY MORNING TO LET US KNOW HOW SHE IS FEELING.

## 2025-02-25 NOTE — TELEPHONE ENCOUNTER
Caller: Shantel Escalante AZRA    Relationship to patient: Self    Best call back number: [phone] (home)     BACK/SHOULDER PAIN - DR. ORDONEZ PATIENT  HYDROCODONE MME 22.50     Type of visit: ESTABLISH- NEW PROVIDER    Requested date: A WEEK OR TWO    If rescheduling, when is the original appointment: 3/3/25     Additional notes: MS ESCALANTE IS HAVING KNEE SX ON 2/28/25 AND IS NOT SURE IF SHE WILL BE ABLE TO COME TO AN APPT ON 3/3/25, SHE WOULD LIKE TO RESCHEDULE. NEXT AVAILABLE IS IN [facility].     SHE ALSO HAS A QUESTION ABOUT PAIN MEDICATION THAT HER ORTHOPEDIC DR PRESCRIBED HER, THAT SHE HAS NOT PICKED UP.

## 2025-02-28 NOTE — PROGRESS NOTES
Subjective   Shantel Maharaj is a 64 y.o. female is here for follow up for lower back pain, shoulder pain, left knee pain, opioid management. She had Left knee manipulation done 2/28/25 with steroid injection- her glucose was 500 after steroid injection. Left knee pain is improved. Previously patient of Dr. Moreno transferring care to me due to Dr. Coguhlin's moving. New patient to me.  She will be starting PT for her knee.     Hx: Longstanding lower back pain.  Also has bilateral shoulder and left arm pain following mastectomy and lymph node stripping.  She was previously seen at outside pain management who started her on narcotic pain management injections but patient had no idea what injection she was receiving at the time when she saw Dr. Moreno.  She has also tried some chiropractic care which exacerbated her pain.  She was transferred to Dr. Moreno.  Pending knee surgery due to high A1c.  Lumbar MRI with mild degenerative changes    Lower back pain is 8/10 on VAS, at maximum is 8/10. Pain is sharp, stabbing in nature. Pain is not referred. The pain is constant. The pain is improved by Norco. The pain is worse with laying in certain way, sitting, standing.    Also, has left arm pain after breast cancer surgery. Aching, sharp pain. Left arm swells up if she uses it too much. Present for 8-9 years ago after lymph nodes removed. Redness +.     PHQ-9-    SOAPP-  [place] back disability scale - 55    Previous Injections:   1/6/2021-bilateral glenohumeral joint injection-significant improvement in pain.    PMH:   Uncontrolled DM-2, hypertension, SSS s/p pacemaker, osteoporosis, history of breast cancer, shoulder surgery, wrist surgery, left knee surgery    Current Medications:   Norco 7.5-325 mg qhs PRN.  Gabapentin 100 mg        Past Medications:    Past Modalities:  TENS:       no          Physical Therapy Within The Last 6 Months     yes  Psychotherapy     no  Massage Therapy      no    Patient Complains  Of:  Uro-Fecal Incontinence no  Weight Gain/Loss  no  Fever/Chills   no  Weakness   no      PEG Assessment   What number best describes your pain on average in the past week?8  What number best describes how, during the past week, pain has interfered with your enjoyment of life?8  What number best describes how, during the past week, pain has interfered with your general activity?  8    PHQ-9 Depression Screening  Little interest or pleasure in doing things? Not at all   Feeling down, depressed, or hopeless? Not at all   PHQ-2 Total Score 0   Trouble falling or staying asleep, or sleeping too much?     Feeling tired or having little energy?     Poor appetite or overeating?     Feeling bad about yourself - or that you are a failure or have let yourself or your family down?     Trouble concentrating on things, such as reading the newspaper or watching television?     Moving or speaking so slowly that other people could have noticed? Or the opposite - being so fidgety or restless that you have been moving around a lot more than usual?     Thoughts that you would be better off dead, or of hurting yourself in some way?     PHQ-9 Total Score     If you checked off any problems, how difficult have these problems made it for you to do your work, take care of things at home, or get along with other people? Not difficult at all        Patient screened positive for depression based on a PHQ-9 score of  on . Follow-up recommendations include: Suicide Risk Assessment performed.        Current Outpatient Medications:     amLODIPine (NORVASC) 10 MG tablet, Take 1 tablet by mouth Daily., Disp: , Rfl:     anastrozole (ARIMIDEX) 1 MG tablet, Take 1 tablet by mouth Daily., Disp: , Rfl:     atorvastatin (LIPITOR) 20 MG tablet, TAKE 1 TABLET BY MOUTH EVERY DAY AT NIGHT, Disp: 90 tablet, Rfl: 3    B-D UF III MINI PEN NEEDLES 31G X 5 MM misc, USE 1 (ONE) NEEDLE TWO TIMES DAILY, Disp: , Rfl:     Calcium 600/Vitamin D 600-400 MG-UNIT  chewable tablet, Chew 1 tablet 2 (Two) Times a Day., Disp: , Rfl:     Continuous Glucose  (Dexcom G7 ) device, , Disp: , Rfl:     Continuous Glucose Sensor (FreeStyle Tali 3 Sensor) misc, CHANGE SENSOR EVERY 14 DAYS, Disp: , Rfl:     cyanocobalamin 1000 MCG/ML injection, INJECT 1ML ONCE MONTHLY, Disp: , Rfl:     cyclobenzaprine (FLEXERIL) 10 MG tablet, Take 1 tablet by mouth 3 (Three) Times a Day As Needed for Muscle Spasms., Disp: 90 tablet, Rfl: 2    dexlansoprazole (DEXILANT) 60 MG capsule, Take 1 capsule by mouth Every Night., Disp: , Rfl:     escitalopram (LEXAPRO) 10 MG tablet, Take 1 tablet by mouth Daily., Disp: , Rfl:     fenofibrate (TRICOR) 145 MG tablet, Take 1 tablet by mouth As Needed., Disp: , Rfl:     ferrous sulfate 325 (65 FE) MG tablet, Take 1 tablet by mouth As Needed., Disp: , Rfl:     fluticasone (FLONASE) 50 MCG/ACT nasal spray, 1 spray by Each Nare route As Needed. As needed, Disp: , Rfl:     gabapentin (NEURONTIN) 100 MG capsule, Take 1 capsule by mouth 3 (Three) Times a Day As Needed., Disp: , Rfl:     glipizide (GLUCOTROL XL) 2.5 MG 24 hr tablet, Take 1 tablet by mouth Every Night., Disp: , Rfl:     [START ON 3/24/2025] HYDROcodone-acetaminophen (NORCO) 7.5-325 MG per tablet, Take 1 tablet by mouth Every 8 (Eight) Hours As Needed for Moderate Pain., Disp: 90 tablet, Rfl: 0    Insulin Lispro Prot & Lispro (humaLOG 75-25) (75-25) 100 UNIT/ML suspension pen-injector pen, INJECT 12 UNITS DAILY BEFORE BREAKFAST AND BEFORE DINNER., Disp: , Rfl:     Lancets (OneTouch Delica Plus [id number]) misc, 1 each Daily. Dx: E11.90, Disp: 100 each, Rfl: 2    levothyroxine (SYNTHROID, LEVOTHROID) 100 MCG tablet, TAKE 1 (ONE) TABLET DAILY WITH 200MCG TO MAKE 300 MCG, Disp: , Rfl:     lisinopril (PRINIVIL,ZESTRIL) 5 MG tablet, Take 1 tablet by mouth Daily., Disp: , Rfl:     montelukast (SINGULAIR) 10 MG tablet, , Disp: , Rfl:     nebivolol (BYSTOLIC) 10 MG tablet, Take 1 tablet by mouth Daily.,  Disp: , Rfl:     nitroglycerin (NITROSTAT) 0.4 MG SL tablet, 1 under the tongue as needed for angina, may repeat q5mins for up three doses, Disp: 100 tablet, Rfl: 11    omeprazole (priLOSEC) 40 MG capsule, Take 1 capsule by mouth Daily., Disp: , Rfl:     OneTouch Verio test strip, 1 each by Other route Daily. Dx: E11.90 Use as instructed, Disp: 50 each, Rfl: 2    oxybutynin XL (DITROPAN-XL) 5 MG 24 hr tablet, Take 1 tablet by mouth Daily., Disp: , Rfl:     polyethylene glycol (MIRALAX) 17 GM/SCOOP powder, Take 17 g by mouth Daily As Needed (Use if senna-docusate is ineffective)., Disp: 510 g, Rfl: 0    potassium chloride 10 MEQ CR tablet, Take 1 tablet by mouth 2 (Two) Times a Day., Disp: 15 tablet, Rfl: 0    promethazine (PHENERGAN) 25 MG tablet, Take 1 tablet by mouth As Needed., Disp: , Rfl:     promethazine-dextromethorphan (PROMETHAZINE-DM) 6.25-15 MG/5ML syrup, , Disp: , Rfl:     Rybelsus 7 MG tablet, Take 7 mg by mouth Daily., Disp: , Rfl:     sennosides-docusate (PERICOLACE) 8.6-50 MG per tablet, Take 2 tablets by mouth 2 (Two) Times a Day As Needed for Constipation., Disp: 60 tablet, Rfl: 0    sucralfate (CARAFATE) 1 g tablet, Take 1 tablet by mouth Daily., Disp: , Rfl:     triamcinolone (KENALOG) 0.5 % cream, 1 (ONE) APPLICATION TO AFFECTED AREAS TWICE DAILY, Disp: , Rfl:     Ibuprofen 3 %, Gabapentin 10 %, Baclofen 2 %, lidocaine 4 %, Ketamine HCl 4 %, Apply 1-2 g topically to the appropriate area as directed 3 (Three) to 4 (Four) times daily., Disp: 90 g, Rfl: 5    The following portions of the patient's history were reviewed and updated as appropriate: allergies, current medications, past family history, past medical history, past social history, past surgical history, and problem list.      REVIEW OF PERTINENT MEDICAL DATA    Past Medical History:   Diagnosis Date    Arm pain     Breast cancer     Cancer     lt breast 2012-- reocc rt breast 12/2020    Chronic pain disorder     Colorectal polyp detected  on colonoscopy 10/06/2023    COVID-19     Diabetes     Drug therapy     High cholesterol     Hx of radiation therapy     Hypertension     Joint pain     Low back pain     Neuropathy in diabetes     Osteoarthritis     Osteopenia     Thyroid disease      Past Surgical History:   Procedure Laterality Date    APPENDECTOMY      BREAST LUMPECTOMY      2021-- [facility]-- cancer-- reocc    BREAST SURGERY      removal of left breast 2012    CARDIAC ELECTROPHYSIOLOGY PROCEDURE N/A 2022    Procedure: Pacemaker DC new-[place];  Surgeon: Ramsey Dinero MD;  Location: [facility] INVASIVE LOCATION;  Service: Cardiology;  Laterality: N/A;     SECTION      x2    GALLBLADDER SURGERY      INSERT / REPLACE / REMOVE PACEMAKER      KNEE SURGERY      left knee - petella broken     OTHER SURGICAL HISTORY      rt arm surgery with shlomo  placement rt arm    SHOULDER SURGERY      broken-lt- in 12 places    WRIST SURGERY      rt - broken     Family History   Problem Relation Age of Onset    Cancer Mother     Breast cancer Mother     Heart disease Father         Matthias    Hypertension Father     Breast cancer Maternal Aunt     Breast cancer Maternal Aunt     Breast cancer Maternal Aunt      Social History     Socioeconomic History    Marital status: Single   Tobacco Use    Smoking status: Former     Current packs/day: 1.00     Types: Cigarettes     Passive exposure: Past    Smokeless tobacco: Never    Tobacco comments:     quit  smoking in    Vaping Use    Vaping status: Never Used   Substance and Sexual Activity    Alcohol use: Never    Drug use: Never    Sexual activity: Not Currently     Birth control/protection: None         Review of Systems   Musculoskeletal:  Positive for arthralgias and back pain.         Vitals:    25 1000   BP: 178/87   Pulse: 77   Resp: 16   SpO2: 99%   Weight: 66.2 kg (146 lb)   PainSc: 8          Objective   Physical Exam  Musculoskeletal:         General: Tenderness present.        Arms:          Legs:            Imaging Reviewed:  MRI lumbar spine without contrast-1/22/2024  - L1-2; L2-3; L3-4; G2-9-vhmzzvvwm facet arthropathy.  Mild bilateral foraminal stenosis at L3-4, L4-5  L5-S1-grade 1 spondylolisthesis with mild bilateral foraminal stenosis with facet hypertrophy.        Assessment:    1. High risk medication use    2. Lumbar radiculopathy    3. Osteoarthritis of both shoulders, unspecified osteoarthritis type    4. Lumbar spondylosis    5. Left arm pain         Plan:   1. UDS On 11/11/24 is consistent with patient's interviwe. Narcotic contract signed.   2. We discussed trying a course of formal physical therapy.  Physical therapy can help strengthen and stretch the muscles around the joints. Continue to be as active as possible. Patient has done PT in past.   3. Reviewed Dr. Coughlin's notes, imaging and other physician's notes. Continue Norco 7.5-325 mg TID PRN (3/24/25). Takes sparingly. Discussed with the patient regarding long-term side effects of opioids including but not limited to opioid induced hormonal suppression, hyperalgesia, fatigue, weight gain, possible opioid induced altered immune system, addiction, tolerance, dependence, risk of hearing loss and elevated risk of myocardial infarction. Proper use and potential life threatening side effects of over use discussed with patient. Patient states understanding of their use and risks.  Opioid Education for patient's receiving narcotics for pain: Patient was instructed regarding the risks, benefits, alternative forms of treatment, as well as potential development of tolerance and/or addiction. Patient was instructed to take the medication strictly as ordered, not to share the medication with others, not to drive while taking opioids, and to take no other sedatives/pain medications or alcohol while taking this prescription. The patient was instructed to wean off of the pain medication as healing occurs and pain resolves.   4. Follow up with  Ortho for L knee pain. And Start PT as scheduled.   5. Left arm pain consistent with CRPS. Start Biomed cream with ketamine and gabapentin.  6. Patient has mainly AXIAL lower back pain. Patient informed they would likely benefit from a medial branch block on bilateral from L3 to L5 local only diagnostic.  MRI shows facet arthritis. Facet tenderness is positive from L3 and Sa. Patient informed the procedure is both therapeutic and diagnostic in nature.  The procedure was described in detail and the risks, benefits and alternatives were discussed with the patient (including but not limited to: bleeding, infection, nerve damage, worsening of pain, CSF leak, inability to perform injection, paralysis, seizures, and death) who agreed to proceed.  Discussed RFA at 70-80 degree for  sec if patient has good relief from 2 diagnostic MBB.  She would like to wait for now.    RTC in 3 months.    Get Dhillon DO  Pain Management   [facility]     Greater than 40 minutes was spent with the patient, reviewing records, reviewing images, performing the exam, discussing diagnosis and further treatment options, etc., and greater than 50% was spent on education      INSPECT REPORT    As part of the patient's treatment plan, I may be prescribing controlled substances. The patient has been made aware of appropriate use of such medications, including potential risk of somnolence, limited ability to drive and/or work safely, and the potential for dependence or overdose. It has also been made clear that these medications are for use by this patient only, without concomitant use of alcohol or other substances unless prescribed.     Patient has completed prescribing agreement detailing terms of continued prescribing of controlled substances, including monitoring INSPECT reports, urine drug screening, and pill counts if necessary. The patient is aware that inappropriate use will results in cessation of prescribing such  medications.    INSPECT report has been reviewed and scanned into the patient's chart.      EMR Dragon/Transcription Disclaimer:   Much of this encounter note is an electronic transcription/translation of spoken language to printed text. The electronic translation of spoken language may permit erroneous, or at times, nonsensical words or phrases to be inadvertently transcribed; Although I have reviewed the note for such errors, some may still exist.

## 2025-03-03 ENCOUNTER — OFFICE VISIT (OUTPATIENT)
Dept: PAIN MEDICINE | Facility: CLINIC | Age: 65
End: 2025-03-03
Payer: MEDICARE

## 2025-03-03 VITALS
DIASTOLIC BLOOD PRESSURE: 87 MMHG | RESPIRATION RATE: 16 BRPM | WEIGHT: 146 LBS | HEART RATE: 77 BPM | SYSTOLIC BLOOD PRESSURE: 178 MMHG | OXYGEN SATURATION: 99 % | BODY MASS INDEX: 25.06 KG/M2

## 2025-03-03 DIAGNOSIS — M19.011 OSTEOARTHRITIS OF BOTH SHOULDERS, UNSPECIFIED OSTEOARTHRITIS TYPE: ICD-10-CM

## 2025-03-03 DIAGNOSIS — Z79.899 HIGH RISK MEDICATION USE: Primary | ICD-10-CM

## 2025-03-03 DIAGNOSIS — M19.012 OSTEOARTHRITIS OF BOTH SHOULDERS, UNSPECIFIED OSTEOARTHRITIS TYPE: ICD-10-CM

## 2025-03-03 DIAGNOSIS — M47.816 LUMBAR SPONDYLOSIS: ICD-10-CM

## 2025-03-03 DIAGNOSIS — M79.602 LEFT ARM PAIN: ICD-10-CM

## 2025-03-03 DIAGNOSIS — M54.16 LUMBAR RADICULOPATHY: ICD-10-CM

## 2025-03-03 PROCEDURE — 3079F DIAST BP 80-89 MM HG: CPT | Performed by: STUDENT IN AN ORGANIZED HEALTH CARE EDUCATION/TRAINING PROGRAM

## 2025-03-03 PROCEDURE — 99215 OFFICE O/P EST HI 40 MIN: CPT | Performed by: STUDENT IN AN ORGANIZED HEALTH CARE EDUCATION/TRAINING PROGRAM

## 2025-03-03 PROCEDURE — 1159F MED LIST DOCD IN RCRD: CPT | Performed by: STUDENT IN AN ORGANIZED HEALTH CARE EDUCATION/TRAINING PROGRAM

## 2025-03-03 PROCEDURE — 1160F RVW MEDS BY RX/DR IN RCRD: CPT | Performed by: STUDENT IN AN ORGANIZED HEALTH CARE EDUCATION/TRAINING PROGRAM

## 2025-03-03 PROCEDURE — 3077F SYST BP >= 140 MM HG: CPT | Performed by: STUDENT IN AN ORGANIZED HEALTH CARE EDUCATION/TRAINING PROGRAM

## 2025-03-03 PROCEDURE — 1125F AMNT PAIN NOTED PAIN PRSNT: CPT | Performed by: STUDENT IN AN ORGANIZED HEALTH CARE EDUCATION/TRAINING PROGRAM

## 2025-03-03 RX ORDER — HYDROCODONE BITARTRATE AND ACETAMINOPHEN 7.5; 325 MG/1; MG/1
1 TABLET ORAL EVERY 8 HOURS PRN
Qty: 90 TABLET | Refills: 0 | Status: SHIPPED | OUTPATIENT
Start: 2025-03-24

## 2025-05-13 ENCOUNTER — OFFICE VISIT (OUTPATIENT)
Dept: CARDIOLOGY | Facility: CLINIC | Age: 65
End: 2025-05-13
Payer: MEDICARE

## 2025-05-13 ENCOUNTER — CLINICAL SUPPORT NO REQUIREMENTS (OUTPATIENT)
Dept: CARDIOLOGY | Facility: CLINIC | Age: 65
End: 2025-05-13
Payer: MEDICARE

## 2025-05-13 VITALS
OXYGEN SATURATION: 98 % | BODY MASS INDEX: 25.91 KG/M2 | SYSTOLIC BLOOD PRESSURE: 181 MMHG | HEART RATE: 78 BPM | DIASTOLIC BLOOD PRESSURE: 96 MMHG | HEIGHT: 64 IN | WEIGHT: 151.75 LBS

## 2025-05-13 DIAGNOSIS — I49.5 SICK SINUS SYNDROME: Primary | ICD-10-CM

## 2025-05-13 DIAGNOSIS — I49.5 SICK SINUS SYNDROME: ICD-10-CM

## 2025-05-13 DIAGNOSIS — Z95.0 PACEMAKER: Primary | ICD-10-CM

## 2025-05-13 DIAGNOSIS — Z95.0 PACEMAKER: ICD-10-CM

## 2025-05-13 DIAGNOSIS — I20.89 ANGINA AT REST: ICD-10-CM

## 2025-05-13 PROCEDURE — 99213 OFFICE O/P EST LOW 20 MIN: CPT | Performed by: INTERNAL MEDICINE

## 2025-05-13 PROCEDURE — 3077F SYST BP >= 140 MM HG: CPT | Performed by: INTERNAL MEDICINE

## 2025-05-13 PROCEDURE — 3080F DIAST BP >= 90 MM HG: CPT | Performed by: INTERNAL MEDICINE

## 2025-05-13 PROCEDURE — 1160F RVW MEDS BY RX/DR IN RCRD: CPT | Performed by: INTERNAL MEDICINE

## 2025-05-13 PROCEDURE — 93000 ELECTROCARDIOGRAM COMPLETE: CPT | Performed by: INTERNAL MEDICINE

## 2025-05-13 PROCEDURE — 1159F MED LIST DOCD IN RCRD: CPT | Performed by: INTERNAL MEDICINE

## 2025-05-13 NOTE — H&P (VIEW-ONLY)
Progress note      Name: Shantel Maharaj ADMIT: (Not on file)   : 1960  PCP: Gris Dominguez MD    MRN: [id number] LOS: 0 days   AGE/SEX: 65 y.o. female  ROOM: Room/bed info not found     Chief Complaint   Patient presents with    Follow-up     W/device       Subjective       History of present illness  Shantel Maharaj is a 65-year-old female patient's who has hypertension, dyslipidemia, diabetes, history of breast cancer with bilateral mastectomy, chronically swollen left arm due to lymphedema, sick sinus syndrome status post dual-chamber pacemaker implantation on 2022 right-sided, here today for follow-up.  Patient continues to have chest pain felt chest tightness and sometimes wake her up at night.  She had been having these in the past but they seem to be getting worse.    Past Medical History:   Diagnosis Date    Arm pain     Breast cancer     Cancer     lt breast -- reocc rt breast 2020    Chronic pain disorder     Colorectal polyp detected on colonoscopy 10/06/2023    COVID-19     Diabetes     Drug therapy     High cholesterol     Hx of radiation therapy     Hypertension     Joint pain     Low back pain     Neuropathy in diabetes     Osteoarthritis     Osteopenia     Thyroid disease      Past Surgical History:   Procedure Laterality Date    APPENDECTOMY      BREAST LUMPECTOMY      2021-- [facility]-- cancer-- reocc    BREAST SURGERY      removal of left breast     CARDIAC ELECTROPHYSIOLOGY PROCEDURE N/A 2022    Procedure: Pacemaker DC new-[place];  Surgeon: Ramsey Dinero MD;  Location: [facility] CATH INVASIVE LOCATION;  Service: Cardiology;  Laterality: N/A;     SECTION      x2    GALLBLADDER SURGERY      INSERT / REPLACE / REMOVE PACEMAKER      KNEE SURGERY      left knee - petella broken     OTHER SURGICAL HISTORY      rt arm surgery with shlomo  placement rt arm    SHOULDER SURGERY      broken-lt- in 12 places    WRIST SURGERY      rt - broken     Family History   Problem  Relation Age of Onset    Cancer Mother     Breast cancer Mother     Heart disease Father         Matthias    Hypertension Father     Breast cancer Maternal Aunt     Breast cancer Maternal Aunt     Breast cancer Maternal Aunt      Social History     Tobacco Use    Smoking status: Former     Current packs/day: 1.00     Types: Cigarettes     Passive exposure: Past    Smokeless tobacco: Never    Tobacco comments:     quit  smoking in 2008   Vaping Use    Vaping status: Never Used   Substance Use Topics    Alcohol use: Never    Drug use: Never       Current Outpatient Medications:     amLODIPine (NORVASC) 10 MG tablet, Take 1 tablet by mouth Daily., Disp: , Rfl:     anastrozole (ARIMIDEX) 1 MG tablet, Take 1 tablet by mouth Daily., Disp: , Rfl:     atorvastatin (LIPITOR) 20 MG tablet, TAKE 1 TABLET BY MOUTH EVERY DAY AT NIGHT, Disp: 90 tablet, Rfl: 3    B-D UF III MINI PEN NEEDLES 31G X 5 MM misc, USE 1 (ONE) NEEDLE TWO TIMES DAILY, Disp: , Rfl:     Calcium 600/Vitamin D 600-400 MG-UNIT chewable tablet, Chew 1 tablet 2 (Two) Times a Day., Disp: , Rfl:     Continuous Glucose  (Dexcom G7 ) device, , Disp: , Rfl:     Continuous Glucose Sensor (FreeStyle Tali 3 Sensor) [facility], CHANGE SENSOR EVERY 14 DAYS, Disp: , Rfl:     cyanocobalamin 1000 MCG/ML injection, INJECT 1ML ONCE MONTHLY, Disp: , Rfl:     cyclobenzaprine (FLEXERIL) 10 MG tablet, Take 1 tablet by mouth 3 (Three) Times a Day As Needed for Muscle Spasms., Disp: 90 tablet, Rfl: 2    dexlansoprazole (DEXILANT) 60 MG capsule, Take 1 capsule by mouth Every Night., Disp: , Rfl:     escitalopram (LEXAPRO) 10 MG tablet, Take 1 tablet by mouth Daily., Disp: , Rfl:     fenofibrate (TRICOR) 145 MG tablet, Take 1 tablet by mouth As Needed., Disp: , Rfl:     ferrous sulfate 325 (65 FE) MG tablet, Take 1 tablet by mouth As Needed., Disp: , Rfl:     fluticasone (FLONASE) 50 MCG/ACT nasal spray, 1 spray by Each Nare route As Needed. As needed, Disp: , Rfl:      gabapentin (NEURONTIN) 100 MG capsule, Take 1 capsule by mouth 3 (Three) Times a Day As Needed., Disp: , Rfl:     glipizide (GLUCOTROL XL) 2.5 MG 24 hr tablet, Take 1 tablet by mouth Every Night., Disp: , Rfl:     HYDROcodone-acetaminophen (NORCO) 7.5-325 MG per tablet, Take 1 tablet by mouth Every 8 (Eight) Hours As Needed for Moderate Pain., Disp: 90 tablet, Rfl: 0    Ibuprofen 3 %, Gabapentin 10 %, Baclofen 2 %, lidocaine 4 %, Ketamine HCl 4 %, Apply 1-2 g topically to the appropriate area as directed 3 (Three) to 4 (Four) times daily., Disp: 90 g, Rfl: 5    Insulin Lispro Prot & Lispro (humaLOG 75-25) (75-25) 100 UNIT/ML suspension pen-injector pen, INJECT 12 UNITS DAILY BEFORE BREAKFAST AND BEFORE DINNER., Disp: , Rfl:     Lancets (OneTouch Delica Plus [id number]) misc, 1 each Daily. Dx: E11.90, Disp: 100 each, Rfl: 2    levothyroxine (SYNTHROID, LEVOTHROID) 100 MCG tablet, TAKE 1 (ONE) TABLET DAILY WITH 200MCG TO MAKE 300 MCG, Disp: , Rfl:     lisinopril (PRINIVIL,ZESTRIL) 5 MG tablet, Take 1 tablet by mouth Daily., Disp: , Rfl:     montelukast (SINGULAIR) 10 MG tablet, , Disp: , Rfl:     nebivolol (BYSTOLIC) 10 MG tablet, Take 1 tablet by mouth Daily., Disp: , Rfl:     nitroglycerin (NITROSTAT) 0.4 MG SL tablet, 1 under the tongue as needed for angina, may repeat q5mins for up three doses, Disp: 100 tablet, Rfl: 11    omeprazole (priLOSEC) 40 MG capsule, Take 1 capsule by mouth Daily., Disp: , Rfl:     OneTouch Verio test strip, 1 each by Other route Daily. Dx: E11.90 Use as instructed, Disp: 50 each, Rfl: 2    oxybutynin XL (DITROPAN-XL) 5 MG 24 hr tablet, Take 1 tablet by mouth Daily., Disp: , Rfl:     polyethylene glycol (MIRALAX) 17 GM/SCOOP powder, Take 17 g by mouth Daily As Needed (Use if senna-docusate is ineffective)., Disp: 510 g, Rfl: 0    potassium chloride 10 MEQ CR tablet, Take 1 tablet by mouth 2 (Two) Times a Day., Disp: 15 tablet, Rfl: 0    promethazine (PHENERGAN) 25 MG tablet, Take 1 tablet  by mouth As Needed., Disp: , Rfl:     promethazine-dextromethorphan (PROMETHAZINE-DM) 6.25-15 MG/5ML syrup, , Disp: , Rfl:     Rybelsus 7 MG tablet, Take 7 mg by mouth Daily., Disp: , Rfl:     sennosides-docusate (PERICOLACE) 8.6-50 MG per tablet, Take 2 tablets by mouth 2 (Two) Times a Day As Needed for Constipation., Disp: 60 tablet, Rfl: 0    sucralfate (CARAFATE) 1 g tablet, Take 1 tablet by mouth Daily., Disp: , Rfl:     triamcinolone (KENALOG) 0.5 % cream, 1 (ONE) APPLICATION TO AFFECTED AREAS TWICE DAILY, Disp: , Rfl:   Allergies:  Valsartan      Physical Exam  VITALS REVIEWED    General:      well developed, in no acute distress.    Head:      normocephalic and atraumatic.    Eyes:      PERRL/EOM intact, conjunctiva and sclera clear with out nystagmus.    Neck:      no masses, thyromegaly,  trachea central with normal respiratory effort and PMI displaced laterally  Lungs:      Clear to auscultation bilaterally  Heart:       Regular rate and rhythm, no murmur  Msk:      no deformity or scoliosis noted of thoracic or lumbar spine.    Pulses:      pulses normal in all 4 extremities.    Extremities:       No lower extremity edema  Neurologic:      no focal deficits.   alert oriented x3  Skin:      intact without lesions or rashes.    Psych:      alert and cooperative; normal mood and affect; normal attention span and concentration.      Result Review :               Pertinent cardiac workup    Echo 2/22/2022 ejection fraction 60 to 65%  Stress test 10/4/2022 low risk         ECG 12 Lead    Date/Time: 5/13/2025 10:29 AM  Performed by: Ramsey Dinero MD    Authorized by: Ramsey Dinero MD  Comparison: compared with previous ECG   Similar to previous ECG  Rhythm: sinus rhythm  Rate: normal  BPM: 69  Conduction: conduction normal  ST Segments: ST segments normal  QRS axis: normal  Other findings: non-specific ST-T wave changes              Assessment and Plan      Shantel Maharaj is a 64-year-old female  patient who has sick sinus syndrome, dual-chamber pacemaker here for follow-up, she also has hypertension. Device check today showed appropriate function, no arrhythmias. Her blood pressure is elevated today but she is on 3 antihypertensives, her blood pressures at home better.   Patient continues to experience chest pain which is felt as tightness and sometimes wakes her up at night in the past she has refused heart catheterization but now that the symptoms are getting more frequent, she is interested in having it done.  No need to repeat stress test since she has not had one done not too long ago.  I had given her nitroglycerin for chest pain as well.      Diagnoses and all orders for this visit:    1. Sick sinus syndrome (Primary)  Overview:  Added automatically from request for surgery [id number]      2. Pacemaker    3. Angina at rest           Return in about 6 months (around 11/13/2025), or device.  Patient was given instructions and counseling regarding her condition or for health maintenance advice. Please see specific information pulled into the AVS if appropriate.     Electronically signed by Ramsey Dinero MD, 05/13/25, 10:32 AM EDT.

## 2025-05-13 NOTE — LETTER
May 13, 2025     Camron Baires MD  [address] [facility] [place] [address]    Patient: Shantel Maharaj   YOB: 1960   Date of Visit: 2025     Dear Camron Baires MD:       Thank you for referring Shantel Maharaj to me for evaluation. Below are the relevant portions of my assessment and plan of care.    If you have questions, please do not hesitate to call me. I look forward to following Shantel along with you.         Sincerely,        Ramsey Dinero MD        CC: No Recipients    Ramsey Dinero MD  [phone]  Sign when Signing Visit  Progress note      Name: Shantel Maharaj ADMIT: (Not on file)   : 1960  PCP: Gris Dominguez MD    MRN: [id number] LOS: 0 days   AGE/SEX: 65 y.o. female  ROOM: Room/bed info not found     Chief Complaint   Patient presents with   • Follow-up     W/device       Subjective      History of present illness  Shantel Maharaj is a 65-year-old female patient's who has hypertension, dyslipidemia, diabetes, history of breast cancer with bilateral mastectomy, chronically swollen left arm due to lymphedema, sick sinus syndrome status post dual-chamber pacemaker implantation on 2022 right-sided, here today for follow-up.  Patient continues to have chest pain felt chest tightness and sometimes wake her up at night.  She had been having these in the past but they seem to be getting worse.    Past Medical History:   Diagnosis Date   • Arm pain    • Breast cancer    • Cancer     lt breast -- reocc rt breast 2020   • Chronic pain disorder    • Colorectal polyp detected on colonoscopy 10/06/2023   • COVID-19    • Diabetes    • Drug therapy    • High cholesterol    • Hx of radiation therapy    • Hypertension    • Joint pain    • Low back pain    • Neuropathy in diabetes    • Osteoarthritis    • Osteopenia    • Thyroid disease      Past Surgical History:   Procedure Laterality Date   • APPENDECTOMY     • BREAST LUMPECTOMY      2021-- [facility] [address]--  cancer-- reocc   • BREAST SURGERY      removal of left breast    • CARDIAC ELECTROPHYSIOLOGY PROCEDURE N/A 2022    Procedure: Pacemaker DC new-[place];  Surgeon: Ramsey Dinero MD;  Location: [facility] INVASIVE LOCATION;  Service: Cardiology;  Laterality: N/A;   •  SECTION      x2   • GALLBLADDER SURGERY     • INSERT / REPLACE / REMOVE PACEMAKER     • KNEE SURGERY      left knee - petella broken    • OTHER SURGICAL HISTORY      rt arm surgery with shlomo  placement rt arm   • SHOULDER SURGERY      broken-lt- in 12 places   • WRIST SURGERY      rt - broken     Family History   Problem Relation Age of Onset   • Cancer Mother    • Breast cancer Mother    • Heart disease Father         Ward   • Hypertension Father    • Breast cancer Maternal Aunt    • Breast cancer Maternal Aunt    • Breast cancer Maternal Aunt      Social History     Tobacco Use   • Smoking status: Former     Current packs/day: 1.00     Types: Cigarettes     Passive exposure: Past   • Smokeless tobacco: Never   • Tobacco comments:     quit  smoking in    Vaping Use   • Vaping status: Never Used   Substance Use Topics   • Alcohol use: Never   • Drug use: Never       Current Outpatient Medications:   •  amLODIPine (NORVASC) 10 MG tablet, Take 1 tablet by mouth Daily., Disp: , Rfl:   •  anastrozole (ARIMIDEX) 1 MG tablet, Take 1 tablet by mouth Daily., Disp: , Rfl:   •  atorvastatin (LIPITOR) 20 MG tablet, TAKE 1 TABLET BY MOUTH EVERY DAY AT NIGHT, Disp: 90 tablet, Rfl: 3  •  B-D UF III MINI PEN NEEDLES 31G X 5 MM misc, USE 1 (ONE) NEEDLE TWO TIMES DAILY, Disp: , Rfl:   •  Calcium 600/Vitamin D 600-400 MG-UNIT chewable tablet, Chew 1 tablet 2 (Two) Times a Day., Disp: , Rfl:   •  Continuous Glucose  (Dexcom G7 ) device, , Disp: , Rfl:   •  Continuous Glucose Sensor (FreeStyle Tali 3 Sensor) [facility] [place], CHANGE SENSOR EVERY 14 DAYS, Disp: , Rfl:   •  cyanocobalamin 1000 MCG/ML injection, INJECT 1ML ONCE MONTHLY, Disp: ,  Rfl:   •  cyclobenzaprine (FLEXERIL) 10 MG tablet, Take 1 tablet by mouth 3 (Three) Times a Day As Needed for Muscle Spasms., Disp: 90 tablet, Rfl: 2  •  dexlansoprazole (DEXILANT) 60 MG capsule, Take 1 capsule by mouth Every Night., Disp: , Rfl:   •  escitalopram (LEXAPRO) 10 MG tablet, Take 1 tablet by mouth Daily., Disp: , Rfl:   •  fenofibrate (TRICOR) 145 MG tablet, Take 1 tablet by mouth As Needed., Disp: , Rfl:   •  ferrous sulfate 325 (65 FE) MG tablet, Take 1 tablet by mouth As Needed., Disp: , Rfl:   •  fluticasone (FLONASE) 50 MCG/ACT nasal spray, 1 spray by Each Nare route As Needed. As needed, Disp: , Rfl:   •  gabapentin (NEURONTIN) 100 MG capsule, Take 1 capsule by mouth 3 (Three) Times a Day As Needed., Disp: , Rfl:   •  glipizide (GLUCOTROL XL) 2.5 MG 24 hr tablet, Take 1 tablet by mouth Every Night., Disp: , Rfl:   •  HYDROcodone-acetaminophen (NORCO) 7.5-325 MG per tablet, Take 1 tablet by mouth Every 8 (Eight) Hours As Needed for Moderate Pain., Disp: 90 tablet, Rfl: 0  •  Ibuprofen 3 %, Gabapentin 10 %, Baclofen 2 %, lidocaine 4 %, Ketamine HCl 4 %, Apply 1-2 g topically to the appropriate area as directed 3 (Three) to 4 (Four) times daily., Disp: 90 g, Rfl: 5  •  Insulin Lispro Prot & Lispro (humaLOG 75-25) (75-25) 100 UNIT/ML suspension pen-injector pen, INJECT 12 UNITS DAILY BEFORE BREAKFAST AND BEFORE DINNER., Disp: , Rfl:   •  Lancets (OneTouch Delica Plus [id number]) misc, 1 each Daily. Dx: E11.90, Disp: 100 each, Rfl: 2  •  levothyroxine (SYNTHROID, LEVOTHROID) 100 MCG tablet, TAKE 1 (ONE) TABLET DAILY WITH 200MCG TO MAKE 300 MCG, Disp: , Rfl:   •  lisinopril (PRINIVIL,ZESTRIL) 5 MG tablet, Take 1 tablet by mouth Daily., Disp: , Rfl:   •  montelukast (SINGULAIR) 10 MG tablet, , Disp: , Rfl:   •  nebivolol (BYSTOLIC) 10 MG tablet, Take 1 tablet by mouth Daily., Disp: , Rfl:   •  nitroglycerin (NITROSTAT) 0.4 MG SL tablet, 1 under the tongue as needed for angina, may repeat q5mins for up  three doses, Disp: 100 tablet, Rfl: 11  •  omeprazole (priLOSEC) 40 MG capsule, Take 1 capsule by mouth Daily., Disp: , Rfl:   •  OneTouch Verio test strip, 1 each by Other route Daily. Dx: E11.90 Use as instructed, Disp: 50 each, Rfl: 2  •  oxybutynin XL (DITROPAN-XL) 5 MG 24 hr tablet, Take 1 tablet by mouth Daily., Disp: , Rfl:   •  polyethylene glycol (MIRALAX) 17 GM/SCOOP powder, Take 17 g by mouth Daily As Needed (Use if senna-docusate is ineffective)., Disp: 510 g, Rfl: 0  •  potassium chloride 10 MEQ CR tablet, Take 1 tablet by mouth 2 (Two) Times a Day., Disp: 15 tablet, Rfl: 0  •  promethazine (PHENERGAN) 25 MG tablet, Take 1 tablet by mouth As Needed., Disp: , Rfl:   •  promethazine-dextromethorphan (PROMETHAZINE-DM) 6.25-15 MG/5ML syrup, , Disp: , Rfl:   •  Rybelsus 7 MG tablet, Take 7 mg by mouth Daily., Disp: , Rfl:   •  sennosides-docusate (PERICOLACE) 8.6-50 MG per tablet, Take 2 tablets by mouth 2 (Two) Times a Day As Needed for Constipation., Disp: 60 tablet, Rfl: 0  •  sucralfate (CARAFATE) 1 g tablet, Take 1 tablet by mouth Daily., Disp: , Rfl:   •  triamcinolone (KENALOG) 0.5 % cream, 1 (ONE) APPLICATION TO AFFECTED AREAS TWICE DAILY, Disp: , Rfl:   Allergies:  Valsartan      Physical Exam  VITALS REVIEWED    General:      well developed, in no acute distress.    Head:      normocephalic and atraumatic.    Eyes:      PERRL/EOM intact, conjunctiva and sclera clear with out nystagmus.    Neck:      no masses, thyromegaly,  trachea central with normal respiratory effort and PMI displaced laterally  Lungs:      Clear to auscultation bilaterally  Heart:       Regular rate and rhythm, no murmur  Msk:      no deformity or scoliosis noted of thoracic or lumbar spine.    Pulses:      pulses normal in all 4 extremities.    Extremities:       No lower extremity edema  Neurologic:      no focal deficits.   alert oriented x3  Skin:      intact without lesions or rashes.    Psych:      alert and cooperative;  normal mood and affect; normal attention span and concentration.      Result Review:               Pertinent cardiac workup    Echo 2/22/2022 ejection fraction 60 to 65%  Stress test 10/4/2022 low risk         ECG 12 Lead    Date/Time: 5/13/2025 10:29 AM  Performed by: Ramsey Dinero MD    Authorized by: Ramsey Dinero MD  Comparison: compared with previous ECG   Similar to previous ECG  Rhythm: sinus rhythm  Rate: normal  BPM: 69  Conduction: conduction normal  ST Segments: ST segments normal  QRS axis: normal  Other findings: non-specific ST-T wave changes              Assessment and Plan      Shantel Maharaj is a 64-year-old female patient who has sick sinus syndrome, dual-chamber pacemaker here for follow-up, she also has hypertension. Device check today showed appropriate function, no arrhythmias. Her blood pressure is elevated today but she is on 3 antihypertensives, her blood pressures at home better.   Patient continues to experience chest pain which is felt as tightness and sometimes wakes her up at night in the past she has refused heart catheterization but now that the symptoms are getting more frequent, she is interested in having it done.  No need to repeat stress test since she has not had one done not too long ago.  I had given her nitroglycerin for chest pain as well.      Diagnoses and all orders for this visit:    1. Sick sinus syndrome (Primary)  Overview:  Added automatically from request for surgery [id number]      2. Pacemaker    3. Angina at rest           Return in about 6 months (around 11/13/2025), or device.  Patient was given instructions and counseling regarding her condition or for health maintenance advice. Please see specific information pulled into the AVS if appropriate.     Electronically signed by Ramsey Dinero MD, 05/13/25, 10:32 AM EDT.

## 2025-05-13 NOTE — PROGRESS NOTES
Progress note      Name: Shantel Maharaj ADMIT: (Not on file)   : 1960  PCP: Gris Dominguez MD    MRN: [id number] LOS: 0 days   AGE/SEX: 65 y.o. female  ROOM: Room/bed info not found     Chief Complaint   Patient presents with    Follow-up     W/device       Subjective       History of present illness  Shantel Maharaj is a 65-year-old female patient's who has hypertension, dyslipidemia, diabetes, history of breast cancer with bilateral mastectomy, chronically swollen left arm due to lymphedema, sick sinus syndrome status post dual-chamber pacemaker implantation on 2022 right-sided, here today for follow-up.  Patient continues to have chest pain felt chest tightness and sometimes wake her up at night.  She had been having these in the past but they seem to be getting worse.    Past Medical History:   Diagnosis Date    Arm pain     Breast cancer     Cancer     lt breast -- reocc rt breast 2020    Chronic pain disorder     Colorectal polyp detected on colonoscopy 10/06/2023    COVID-19     Diabetes     Drug therapy     High cholesterol     Hx of radiation therapy     Hypertension     Joint pain     Low back pain     Neuropathy in diabetes     Osteoarthritis     Osteopenia     Thyroid disease      Past Surgical History:   Procedure Laterality Date    APPENDECTOMY      BREAST LUMPECTOMY      2021-- [facility]-- cancer-- reocc    BREAST SURGERY      removal of left breast     CARDIAC ELECTROPHYSIOLOGY PROCEDURE N/A 2022    Procedure: Pacemaker DC new-[place];  Surgeon: Ramsey Dinero MD;  Location: [facility] CATH INVASIVE LOCATION;  Service: Cardiology;  Laterality: N/A;     SECTION      x2    GALLBLADDER SURGERY      INSERT / REPLACE / REMOVE PACEMAKER      KNEE SURGERY      left knee - petella broken     OTHER SURGICAL HISTORY      rt arm surgery with shlomo  placement rt arm    SHOULDER SURGERY      broken-lt- in 12 places    WRIST SURGERY      rt - broken     Family History   Problem  Relation Age of Onset    Cancer Mother     Breast cancer Mother     Heart disease Father         Matthias    Hypertension Father     Breast cancer Maternal Aunt     Breast cancer Maternal Aunt     Breast cancer Maternal Aunt      Social History     Tobacco Use    Smoking status: Former     Current packs/day: 1.00     Types: Cigarettes     Passive exposure: Past    Smokeless tobacco: Never    Tobacco comments:     quit  smoking in 2008   Vaping Use    Vaping status: Never Used   Substance Use Topics    Alcohol use: Never    Drug use: Never       Current Outpatient Medications:     amLODIPine (NORVASC) 10 MG tablet, Take 1 tablet by mouth Daily., Disp: , Rfl:     anastrozole (ARIMIDEX) 1 MG tablet, Take 1 tablet by mouth Daily., Disp: , Rfl:     atorvastatin (LIPITOR) 20 MG tablet, TAKE 1 TABLET BY MOUTH EVERY DAY AT NIGHT, Disp: 90 tablet, Rfl: 3    B-D UF III MINI PEN NEEDLES 31G X 5 MM misc, USE 1 (ONE) NEEDLE TWO TIMES DAILY, Disp: , Rfl:     Calcium 600/Vitamin D 600-400 MG-UNIT chewable tablet, Chew 1 tablet 2 (Two) Times a Day., Disp: , Rfl:     Continuous Glucose  (Dexcom G7 ) device, , Disp: , Rfl:     Continuous Glucose Sensor (FreeStyle [place] 3 Sensor) [facility] [place] CHANGE SENSOR EVERY 14 DAYS, Disp: , Rfl:     cyanocobalamin 1000 MCG/ML injection, INJECT 1ML ONCE MONTHLY, Disp: , Rfl:     cyclobenzaprine (FLEXERIL) 10 MG tablet, Take 1 tablet by mouth 3 (Three) Times a Day As Needed for Muscle Spasms., Disp: 90 tablet, Rfl: 2    dexlansoprazole (DEXILANT) 60 MG capsule, Take 1 capsule by mouth Every Night., Disp: , Rfl:     escitalopram (LEXAPRO) 10 MG tablet, Take 1 tablet by mouth Daily., Disp: , Rfl:     fenofibrate (TRICOR) 145 MG tablet, Take 1 tablet by mouth As Needed., Disp: , Rfl:     ferrous sulfate 325 (65 FE) MG tablet, Take 1 tablet by mouth As Needed., Disp: , Rfl:     fluticasone (FLONASE) 50 MCG/ACT nasal spray, 1 spray by Each Nare route As Needed. As needed, Disp: , Rfl:      gabapentin (NEURONTIN) 100 MG capsule, Take 1 capsule by mouth 3 (Three) Times a Day As Needed., Disp: , Rfl:     glipizide (GLUCOTROL XL) 2.5 MG 24 hr tablet, Take 1 tablet by mouth Every Night., Disp: , Rfl:     HYDROcodone-acetaminophen (NORCO) 7.5-325 MG per tablet, Take 1 tablet by mouth Every 8 (Eight) Hours As Needed for Moderate Pain., Disp: 90 tablet, Rfl: 0    Ibuprofen 3 %, Gabapentin 10 %, Baclofen 2 %, lidocaine 4 %, Ketamine HCl 4 %, Apply 1-2 g topically to the appropriate area as directed 3 (Three) to 4 (Four) times daily., Disp: 90 g, Rfl: 5    Insulin Lispro Prot & Lispro (humaLOG 75-25) (75-25) 100 UNIT/ML suspension pen-injector pen, INJECT 12 UNITS DAILY BEFORE BREAKFAST AND BEFORE DINNER., Disp: , Rfl:     Lancets (OneTouch Delica Plus [id number]) misc, 1 each Daily. Dx: E11.90, Disp: 100 each, Rfl: 2    levothyroxine (SYNTHROID, LEVOTHROID) 100 MCG tablet, TAKE 1 (ONE) TABLET DAILY WITH 200MCG TO MAKE 300 MCG, Disp: , Rfl:     lisinopril (PRINIVIL,ZESTRIL) 5 MG tablet, Take 1 tablet by mouth Daily., Disp: , Rfl:     montelukast (SINGULAIR) 10 MG tablet, , Disp: , Rfl:     nebivolol (BYSTOLIC) 10 MG tablet, Take 1 tablet by mouth Daily., Disp: , Rfl:     nitroglycerin (NITROSTAT) 0.4 MG SL tablet, 1 under the tongue as needed for angina, may repeat q5mins for up three doses, Disp: 100 tablet, Rfl: 11    omeprazole (priLOSEC) 40 MG capsule, Take 1 capsule by mouth Daily., Disp: , Rfl:     OneTouch Verio test strip, 1 each by Other route Daily. Dx: E11.90 Use as instructed, Disp: 50 each, Rfl: 2    oxybutynin XL (DITROPAN-XL) 5 MG 24 hr tablet, Take 1 tablet by mouth Daily., Disp: , Rfl:     polyethylene glycol (MIRALAX) 17 GM/SCOOP powder, Take 17 g by mouth Daily As Needed (Use if senna-docusate is ineffective)., Disp: 510 g, Rfl: 0    potassium chloride 10 MEQ CR tablet, Take 1 tablet by mouth 2 (Two) Times a Day., Disp: 15 tablet, Rfl: 0    promethazine (PHENERGAN) 25 MG tablet, Take 1 tablet  by mouth As Needed., Disp: , Rfl:     promethazine-dextromethorphan (PROMETHAZINE-DM) 6.25-15 MG/5ML syrup, , Disp: , Rfl:     Rybelsus 7 MG tablet, Take 7 mg by mouth Daily., Disp: , Rfl:     sennosides-docusate (PERICOLACE) 8.6-50 MG per tablet, Take 2 tablets by mouth 2 (Two) Times a Day As Needed for Constipation., Disp: 60 tablet, Rfl: 0    sucralfate (CARAFATE) 1 g tablet, Take 1 tablet by mouth Daily., Disp: , Rfl:     triamcinolone (KENALOG) 0.5 % cream, 1 (ONE) APPLICATION TO AFFECTED AREAS TWICE DAILY, Disp: , Rfl:   Allergies:  Valsartan      Physical Exam  VITALS REVIEWED    General:      well developed, in no acute distress.    Head:      normocephalic and atraumatic.    Eyes:      PERRL/EOM intact, conjunctiva and sclera clear with out nystagmus.    Neck:      no masses, thyromegaly,  trachea central with normal respiratory effort and PMI displaced laterally  Lungs:      Clear to auscultation bilaterally  Heart:       Regular rate and rhythm, no murmur  Msk:      no deformity or scoliosis noted of thoracic or lumbar spine.    Pulses:      pulses normal in all 4 extremities.    Extremities:       No lower extremity edema  Neurologic:      no focal deficits.   alert oriented x3  Skin:      intact without lesions or rashes.    Psych:      alert and cooperative; normal mood and affect; normal attention span and concentration.      Result Review :               Pertinent cardiac workup    Echo 2/22/2022 ejection fraction 60 to 65%  Stress test 10/4/2022 low risk         ECG 12 Lead    Date/Time: 5/13/2025 10:29 AM  Performed by: Ramsey Dinero MD    Authorized by: Ramsey Dinero MD  Comparison: compared with previous ECG   Similar to previous ECG  Rhythm: sinus rhythm  Rate: normal  BPM: 69  Conduction: conduction normal  ST Segments: ST segments normal  QRS axis: normal  Other findings: non-specific ST-T wave changes              Assessment and Plan      Shantel Maharaj is a 64-year-old female  patient who has sick sinus syndrome, dual-chamber pacemaker here for follow-up, she also has hypertension. Device check today showed appropriate function, no arrhythmias. Her blood pressure is elevated today but she is on 3 antihypertensives, her blood pressures at home better.   Patient continues to experience chest pain which is felt as tightness and sometimes wakes her up at night in the past she has refused heart catheterization but now that the symptoms are getting more frequent, she is interested in having it done.  No need to repeat stress test since she has not had one done not too long ago.  I had given her nitroglycerin for chest pain as well.      Diagnoses and all orders for this visit:    1. Sick sinus syndrome (Primary)  Overview:  Added automatically from request for surgery [id number]      2. Pacemaker    3. Angina at rest           Return in about 6 months (around 11/13/2025), or device.  Patient was given instructions and counseling regarding her condition or for health maintenance advice. Please see specific information pulled into the AVS if appropriate.     Electronically signed by Ramsey Dinero MD, 05/13/25, 10:32 AM EDT.

## 2025-05-15 ENCOUNTER — TELEPHONE (OUTPATIENT)
Dept: CARDIOLOGY | Facility: CLINIC | Age: 65
End: 2025-05-15
Payer: MEDICARE

## 2025-05-15 DIAGNOSIS — I20.0 UNSTABLE ANGINA: Primary | ICD-10-CM

## 2025-05-29 NOTE — PROGRESS NOTES
Subjective   Shantel Maharaj is a 65 y.o. female is here for follow up for lower back pain, shoulder pain, left knee pain, opioid management.  Last seen on 3/3/2025.  She is having heart cath tomorrow for chest pain.     On last visit: Started biomed cream- has helped with knee pain.     Lower back pain is 8/10 on VAS, at maximum is 8/10. Pain is sharp, stabbing in nature. Pain is not referred. The pain is constant. The pain is improved by Norco. The pain is worse with laying in certain way, sitting, standing.    Also, has left arm pain after breast cancer surgery. Aching, sharp pain. Left arm swells up if she uses it too much. Present for 8-9 years ago after lymph nodes removed. Redness +.     PHQ-9-    SOAPP-  [place] back disability scale - 55    Previous Injections:   1/6/2021-bilateral glenohumeral joint injection-significant improvement in pain.      Hx: Longstanding lower back pain.  Also has bilateral shoulder and left arm pain following mastectomy and lymph node stripping.  She was previously seen at outside pain management who started her on narcotic pain management injections but patient had no idea what injection she was receiving at the time when she saw Dr. Moreno.  She has also tried some chiropractic care which exacerbated her pain.  She was transferred to Dr. Moreno.  Pending knee surgery due to high A1c.  Lumbar MRI with mild degenerative changes. She had Left knee manipulation done 2/28/25 with steroid injection- her glucose was 500 after steroid injection. Left knee pain is improved. Previously patient of Dr. Moreno transferring care to me due to Dr. Coughlin's moving. New patient to me.  She will be starting PT for her knee.     PMH:   Uncontrolled DM-2, hypertension, SSS s/p pacemaker, osteoporosis, history of breast cancer, shoulder surgery, wrist surgery, left knee surgery    Current Medications:   Norco 7.5-325 mg qhs PRN.  Gabapentin 100 mg        Past Medications:    Past  Modalities:  TENS:       no          Physical Therapy Within The Last 6 Months     yes  Psychotherapy     no  Massage Therapy      no    Patient Complains Of:  Uro-Fecal Incontinence no  Weight Gain/Loss  no  Fever/Chills   no  Weakness   no      PEG Assessment   What number best describes your pain on average in the past week?8  What number best describes how, during the past week, pain has interfered with your enjoyment of life?8  What number best describes how, during the past week, pain has interfered with your general activity?  8    PHQ-9 Depression Screening  Little interest or pleasure in doing things? Not at all   Feeling down, depressed, or hopeless? Several days   PHQ-2 Total Score 1   Trouble falling or staying asleep, or sleeping too much?     Feeling tired or having little energy?     Poor appetite or overeating?     Feeling bad about yourself - or that you are a failure or have let yourself or your family down?     Trouble concentrating on things, such as reading the newspaper or watching television?     Moving or speaking so slowly that other people could have noticed? Or the opposite - being so fidgety or restless that you have been moving around a lot more than usual?     Thoughts that you would be better off dead, or of hurting yourself in some way?     PHQ-9 Total Score     If you checked off any problems, how difficult have these problems made it for you to do your work, take care of things at home, or get along with other people? Not difficult at all        Patient screened positive for depression based on a PHQ-9 score of  on . Follow-up recommendations include: Suicide Risk Assessment performed.        Current Outpatient Medications:     amLODIPine (NORVASC) 10 MG tablet, Take 1 tablet by mouth Daily., Disp: , Rfl:     anastrozole (ARIMIDEX) 1 MG tablet, Take 1 tablet by mouth Daily., Disp: , Rfl:     atorvastatin (LIPITOR) 20 MG tablet, TAKE 1 TABLET BY MOUTH EVERY DAY AT NIGHT, Disp: 90  tablet, Rfl: 3    B-D UF III MINI PEN NEEDLES 31G X 5 MM misc, USE 1 (ONE) NEEDLE TWO TIMES DAILY, Disp: , Rfl:     Calcium 600/Vitamin D 600-400 MG-UNIT chewable tablet, Chew 1 tablet 2 (Two) Times a Day., Disp: , Rfl:     Continuous Glucose  (Dexcom G7 ) device, , Disp: , Rfl:     Continuous Glucose Sensor (FreeStyle Tali 3 Sensor) [facility], CHANGE SENSOR EVERY 14 DAYS, Disp: , Rfl:     cyanocobalamin 1000 MCG/ML injection, INJECT 1ML ONCE MONTHLY, Disp: , Rfl:     cyclobenzaprine (FLEXERIL) 10 MG tablet, Take 1 tablet by mouth 3 (Three) Times a Day As Needed for Muscle Spasms., Disp: 90 tablet, Rfl: 2    dexlansoprazole (DEXILANT) 60 MG capsule, Take 1 capsule by mouth Every Night., Disp: , Rfl:     escitalopram (LEXAPRO) 10 MG tablet, Take 1 tablet by mouth Daily., Disp: , Rfl:     fenofibrate (TRICOR) 145 MG tablet, Take 1 tablet by mouth As Needed., Disp: , Rfl:     ferrous sulfate 325 (65 FE) MG tablet, Take 1 tablet by mouth As Needed., Disp: , Rfl:     fluticasone (FLONASE) 50 MCG/ACT nasal spray, 1 spray by Each Nare route As Needed. As needed, Disp: , Rfl:     gabapentin (NEURONTIN) 100 MG capsule, Take 1 capsule by mouth 3 (Three) Times a Day As Needed., Disp: , Rfl:     glipizide (GLUCOTROL XL) 2.5 MG 24 hr tablet, Take 1 tablet by mouth Every Night., Disp: , Rfl:     HYDROcodone-acetaminophen (NORCO) 7.5-325 MG per tablet, Take 1 tablet by mouth Every 8 (Eight) Hours As Needed for Moderate Pain., Disp: 90 tablet, Rfl: 0    Ibuprofen 3 %, Gabapentin 10 %, Baclofen 2 %, lidocaine 4 %, Ketamine HCl 4 %, Apply 1-2 g topically to the appropriate area as directed 3 (Three) to 4 (Four) times daily., Disp: 90 g, Rfl: 5    Insulin Lispro Prot & Lispro (humaLOG 75-25) (75-25) 100 UNIT/ML suspension pen-injector pen, INJECT 12 UNITS DAILY BEFORE BREAKFAST AND BEFORE DINNER., Disp: , Rfl:     Lancets (OneTouch Delica Plus Awgism94Q) misc, 1 each Daily. Dx: E11.90, Disp: 100 each, Rfl: 2     levothyroxine (SYNTHROID, LEVOTHROID) 100 MCG tablet, TAKE 1 (ONE) TABLET DAILY WITH 200MCG TO MAKE 300 MCG, Disp: , Rfl:     lisinopril (PRINIVIL,ZESTRIL) 5 MG tablet, Take 1 tablet by mouth Daily., Disp: , Rfl:     montelukast (SINGULAIR) 10 MG tablet, , Disp: , Rfl:     nebivolol (BYSTOLIC) 10 MG tablet, Take 1 tablet by mouth Daily., Disp: , Rfl:     nitroglycerin (NITROSTAT) 0.4 MG SL tablet, 1 under the tongue as needed for angina, may repeat q5mins for up three doses, Disp: 100 tablet, Rfl: 11    omeprazole (priLOSEC) 40 MG capsule, Take 1 capsule by mouth Daily., Disp: , Rfl:     OneTouch Verio test strip, 1 each by Other route Daily. Dx: E11.90 Use as instructed, Disp: 50 each, Rfl: 2    oxybutynin XL (DITROPAN-XL) 5 MG 24 hr tablet, Take 1 tablet by mouth Daily., Disp: , Rfl:     polyethylene glycol (MIRALAX) 17 GM/SCOOP powder, Take 17 g by mouth Daily As Needed (Use if senna-docusate is ineffective)., Disp: 510 g, Rfl: 0    potassium chloride 10 MEQ CR tablet, Take 1 tablet by mouth 2 (Two) Times a Day., Disp: 15 tablet, Rfl: 0    promethazine (PHENERGAN) 25 MG tablet, Take 1 tablet by mouth As Needed., Disp: , Rfl:     promethazine-dextromethorphan (PROMETHAZINE-DM) 6.25-15 MG/5ML syrup, , Disp: , Rfl:     Rybelsus 7 MG tablet, Take 7 mg by mouth Daily., Disp: , Rfl:     sennosides-docusate (PERICOLACE) 8.6-50 MG per tablet, Take 2 tablets by mouth 2 (Two) Times a Day As Needed for Constipation., Disp: 60 tablet, Rfl: 0    sucralfate (CARAFATE) 1 g tablet, Take 1 tablet by mouth Daily., Disp: , Rfl:     triamcinolone (KENALOG) 0.5 % cream, 1 (ONE) APPLICATION TO AFFECTED AREAS TWICE DAILY, Disp: , Rfl:     The following portions of the patient's history were reviewed and updated as appropriate: allergies, current medications, past family history, past medical history, past social history, past surgical history, and problem list.      REVIEW OF PERTINENT MEDICAL DATA    Past Medical History:   Diagnosis  Date    Arm pain     Breast cancer     Cancer     lt breast 2012-- reocc rt breast 2020    Chronic pain disorder     Colorectal polyp detected on colonoscopy 10/06/2023    COVID-19     Diabetes     Drug therapy     High cholesterol     Hx of radiation therapy     Hypertension     Joint pain     Low back pain     Neuropathy in diabetes     Osteoarthritis     Osteopenia     Thyroid disease      Past Surgical History:   Procedure Laterality Date    APPENDECTOMY      BREAST LUMPECTOMY      2021-- [facility]-- cancer-- reocc    BREAST SURGERY      removal of left breast     CARDIAC ELECTROPHYSIOLOGY PROCEDURE N/A 2022    Procedure: Pacemaker DC new-[place];  Surgeon: Ramsey Dinero MD;  Location: [facility] INVASIVE LOCATION;  Service: Cardiology;  Laterality: N/A;     SECTION      x2    GALLBLADDER SURGERY      INSERT / REPLACE / REMOVE PACEMAKER      KNEE SURGERY      left knee - petella broken     OTHER SURGICAL HISTORY      rt arm surgery with shlomo  placement rt arm    SHOULDER SURGERY      broken-lt- in 12 places    WRIST SURGERY      rt - broken     Family History   Problem Relation Age of Onset    Cancer Mother     Breast cancer Mother     Heart disease Father         [place]    Hypertension Father     Breast cancer Maternal Aunt     Breast cancer Maternal Aunt     Breast cancer Maternal Aunt      Social History     Socioeconomic History    Marital status: Single   Tobacco Use    Smoking status: Former     Current packs/day: 1.00     Types: Cigarettes     Passive exposure: Past    Smokeless tobacco: Never    Tobacco comments:     quit  smoking in    Vaping Use    Vaping status: Never Used   Substance and Sexual Activity    Alcohol use: Never    Drug use: Never    Sexual activity: Not Currently     Birth control/protection: None         Review of Systems   Musculoskeletal:  Positive for arthralgias and back pain.         Vitals:    25 0956   BP: (!) 182/100   Pulse: 74   Resp: 16    SpO2: 98%   Weight: 66.2 kg (146 lb)           Objective   Physical Exam  Musculoskeletal:         General: Tenderness present.        Arms:         Legs:            Imaging Reviewed:  MRI lumbar spine without contrast-1/22/2024  - L1-2; L2-3; L3-4; [id number] facet arthropathy.  Mild bilateral foraminal stenosis at L3-4, L4-5  L5-S1-grade 1 spondylolisthesis with mild bilateral foraminal stenosis with facet hypertrophy.        Assessment:    1. Lumbar radiculopathy    2. Osteoarthritis of both shoulders, unspecified osteoarthritis type    3. High risk medication use    4. Lumbar spondylosis    5. Left arm pain           Plan:   1.  UDS On 11/11/24 is consistent with patient's interviwe. Narcotic contract signed.   2. We discussed trying a course of formal physical therapy.  Physical therapy can help strengthen and stretch the muscles around the joints. Continue to be as active as possible. Patient has done PT in past.   3. Reviewed Dr. Coughlin's notes, imaging and other physician's notes. Continue Norco 7.5-325 mg TID PRN (6/2/25). Takes sparingly. Discussed with the patient regarding long-term side effects of opioids including but not limited to opioid induced hormonal suppression, hyperalgesia, fatigue, weight gain, possible opioid induced altered immune system, addiction, tolerance, dependence, risk of hearing loss and elevated risk of myocardial infarction. Proper use and potential life threatening side effects of over use discussed with patient. Patient states understanding of their use and risks.  Opioid Education for patient's receiving narcotics for pain: Patient was instructed regarding the risks, benefits, alternative forms of treatment, as well as potential development of tolerance and/or addiction. Patient was instructed to take the medication strictly as ordered, not to share the medication with others, not to drive while taking opioids, and to take no other sedatives/pain medications or alcohol  while taking this prescription. The patient was instructed to wean off of the pain medication as healing occurs and pain resolves.   4. Follow up with Ortho for L knee pain. And Start PT as scheduled.   5. Left arm pain consistent with CRPS. Continue  Biomed cream with ketamine and gabapentin.  6. Patient has mainly AXIAL lower back pain. Patient informed they would likely benefit from a medial branch block on bilateral from L3 to L5 local only diagnostic.  MRI shows facet arthritis. Facet tenderness is positive from L3 and Sa. Patient informed the procedure is both therapeutic and diagnostic in nature.  The procedure was described in detail and the risks, benefits and alternatives were discussed with the patient (including but not limited to: bleeding, infection, nerve damage, worsening of pain, CSF leak, inability to perform injection, paralysis, seizures, and death) who agreed to proceed.  Discussed RFA at 70-80 degree for  sec if patient has good relief from 2 diagnostic MBB.  She would like to wait for now.    RTC in 3 months.    Get Dhillon DO  Pain Management   [facility]         INSPECT REPORT    As part of the patient's treatment plan, I may be prescribing controlled substances. The patient has been made aware of appropriate use of such medications, including potential risk of somnolence, limited ability to drive and/or work safely, and the potential for dependence or overdose. It has also been made clear that these medications are for use by this patient only, without concomitant use of alcohol or other substances unless prescribed.     Patient has completed prescribing agreement detailing terms of continued prescribing of controlled substances, including monitoring INSPECT reports, urine drug screening, and pill counts if necessary. The patient is aware that inappropriate use will results in cessation of prescribing such medications.    INSPECT report has been reviewed and scanned into the  patient's chart.      EMR Dragon/Transcription Disclaimer:   Much of this encounter note is an electronic transcription/translation of spoken language to printed text. The electronic translation of spoken language may permit erroneous, or at times, nonsensical words or phrases to be inadvertently transcribed; Although I have reviewed the note for such errors, some may still exist.

## 2025-06-02 ENCOUNTER — OFFICE VISIT (OUTPATIENT)
Dept: PAIN MEDICINE | Facility: CLINIC | Age: 65
End: 2025-06-02
Payer: MEDICARE

## 2025-06-02 VITALS
RESPIRATION RATE: 16 BRPM | WEIGHT: 146 LBS | DIASTOLIC BLOOD PRESSURE: 100 MMHG | BODY MASS INDEX: 25.06 KG/M2 | SYSTOLIC BLOOD PRESSURE: 182 MMHG | HEART RATE: 74 BPM | OXYGEN SATURATION: 98 %

## 2025-06-02 DIAGNOSIS — Z79.899 HIGH RISK MEDICATION USE: ICD-10-CM

## 2025-06-02 DIAGNOSIS — M19.012 OSTEOARTHRITIS OF BOTH SHOULDERS, UNSPECIFIED OSTEOARTHRITIS TYPE: ICD-10-CM

## 2025-06-02 DIAGNOSIS — M47.816 LUMBAR SPONDYLOSIS: ICD-10-CM

## 2025-06-02 DIAGNOSIS — M54.16 LUMBAR RADICULOPATHY: Primary | ICD-10-CM

## 2025-06-02 DIAGNOSIS — M79.602 LEFT ARM PAIN: ICD-10-CM

## 2025-06-02 DIAGNOSIS — M19.011 OSTEOARTHRITIS OF BOTH SHOULDERS, UNSPECIFIED OSTEOARTHRITIS TYPE: ICD-10-CM

## 2025-06-02 PROCEDURE — 3077F SYST BP >= 140 MM HG: CPT | Performed by: STUDENT IN AN ORGANIZED HEALTH CARE EDUCATION/TRAINING PROGRAM

## 2025-06-02 PROCEDURE — 1159F MED LIST DOCD IN RCRD: CPT | Performed by: STUDENT IN AN ORGANIZED HEALTH CARE EDUCATION/TRAINING PROGRAM

## 2025-06-02 PROCEDURE — 99214 OFFICE O/P EST MOD 30 MIN: CPT | Performed by: STUDENT IN AN ORGANIZED HEALTH CARE EDUCATION/TRAINING PROGRAM

## 2025-06-02 PROCEDURE — 3080F DIAST BP >= 90 MM HG: CPT | Performed by: STUDENT IN AN ORGANIZED HEALTH CARE EDUCATION/TRAINING PROGRAM

## 2025-06-02 PROCEDURE — 1160F RVW MEDS BY RX/DR IN RCRD: CPT | Performed by: STUDENT IN AN ORGANIZED HEALTH CARE EDUCATION/TRAINING PROGRAM

## 2025-06-02 PROCEDURE — 1125F AMNT PAIN NOTED PAIN PRSNT: CPT | Performed by: STUDENT IN AN ORGANIZED HEALTH CARE EDUCATION/TRAINING PROGRAM

## 2025-06-02 RX ORDER — HYDROCODONE BITARTRATE AND ACETAMINOPHEN 7.5; 325 MG/1; MG/1
1 TABLET ORAL EVERY 8 HOURS PRN
Qty: 90 TABLET | Refills: 0 | Status: SHIPPED | OUTPATIENT
Start: 2025-06-02

## 2025-06-03 ENCOUNTER — HOSPITAL ENCOUNTER (OUTPATIENT)
Facility: HOSPITAL | Age: 65
Discharge: HOME | End: 2025-06-04
Attending: INTERNAL MEDICINE | Admitting: INTERNAL MEDICINE
Payer: MEDICARE

## 2025-06-03 ENCOUNTER — APPOINTMENT (OUTPATIENT)
Dept: GENERAL RADIOLOGY | Facility: HOSPITAL | Age: 65
End: 2025-06-03
Payer: MEDICARE

## 2025-06-03 DIAGNOSIS — I20.0 UNSTABLE ANGINA: ICD-10-CM

## 2025-06-03 PROBLEM — I25.110 CORONARY ARTERY DISEASE INVOLVING NATIVE CORONARY ARTERY OF NATIVE HEART WITH UNSTABLE ANGINA PECTORIS: Status: ACTIVE | Noted: 2025-06-03

## 2025-06-03 LAB
ACT BLD: 141 SECONDS (ref 89–137)
ACT BLD: 204 SECONDS (ref 89–137)
ANION GAP SERPL CALCULATED.3IONS-SCNC: 11.2 MMOL/L (ref 5–15)
BUN SERPL-MCNC: 12.8 MG/DL (ref 8–23)
BUN/CREAT SERPL: 12.9 (ref 7–25)
CALCIUM SPEC-SCNC: 8.7 MG/DL (ref 8.6–10.5)
CHLORIDE SERPL-SCNC: 105 MMOL/L (ref 98–107)
CO2 SERPL-SCNC: 23.8 MMOL/L (ref 22–29)
CREAT SERPL-MCNC: 0.99 MG/DL (ref 0.57–1)
DEPRECATED RDW RBC AUTO: 38.5 FL (ref 37–54)
EGFRCR SERPLBLD CKD-EPI 2021: 62.6 ML/MIN/1.73
ERYTHROCYTE [DISTWIDTH] IN BLOOD BY AUTOMATED COUNT: 12.6 % (ref 12.3–15.4)
GLUCOSE SERPL-MCNC: 140 MG/DL (ref 65–99)
HCT VFR BLD AUTO: 36.8 % (ref 34–46.6)
HGB BLD-MCNC: 12.3 G/DL (ref 12–15.9)
INR PPP: 1.15 (ref 0.9–1.1)
MCH RBC QN AUTO: 27.7 PG (ref 26.6–33)
MCHC RBC AUTO-ENTMCNC: 33.4 G/DL (ref 31.5–35.7)
MCV RBC AUTO: 82.9 FL (ref 79–97)
PLATELET # BLD AUTO: 261 10*3/MM3 (ref 140–450)
PMV BLD AUTO: 11 FL (ref 6–12)
POTASSIUM SERPL-SCNC: 3.5 MMOL/L (ref 3.5–5.2)
PROTHROMBIN TIME: 14.6 SECONDS (ref 11.7–14.2)
RBC # BLD AUTO: 4.44 10*6/MM3 (ref 3.77–5.28)
SODIUM SERPL-SCNC: 140 MMOL/L (ref 136–145)
WBC NRBC COR # BLD AUTO: 7.27 10*3/MM3 (ref 3.4–10.8)

## 2025-06-03 PROCEDURE — 63710000001 HYDROCODONE-ACETAMINOPHEN 7.5-325 MG TABLET: Performed by: INTERNAL MEDICINE

## 2025-06-03 PROCEDURE — 25010000002 MIDAZOLAM PER 1 MG: Performed by: INTERNAL MEDICINE

## 2025-06-03 PROCEDURE — C1769 GUIDE WIRE: HCPCS | Performed by: INTERNAL MEDICINE

## 2025-06-03 PROCEDURE — A9270 NON-COVERED ITEM OR SERVICE: HCPCS | Performed by: INTERNAL MEDICINE

## 2025-06-03 PROCEDURE — C1725 CATH, TRANSLUMIN NON-LASER: HCPCS | Performed by: INTERNAL MEDICINE

## 2025-06-03 PROCEDURE — 63710000001 MONTELUKAST 10 MG TABLET: Performed by: INTERNAL MEDICINE

## 2025-06-03 PROCEDURE — 63710000001 NEBIVOLOL 10 MG TABLET: Performed by: INTERNAL MEDICINE

## 2025-06-03 PROCEDURE — C1874 STENT, COATED/COV W/DEL SYS: HCPCS | Performed by: INTERNAL MEDICINE

## 2025-06-03 PROCEDURE — 63710000001 LISINOPRIL 5 MG TABLET: Performed by: INTERNAL MEDICINE

## 2025-06-03 PROCEDURE — 63710000001 GLIPIZIDE 5 MG TABLET: Performed by: INTERNAL MEDICINE

## 2025-06-03 PROCEDURE — 25010000002 NITROGLYCERIN 5 MG/ML SOLUTION: Performed by: INTERNAL MEDICINE

## 2025-06-03 PROCEDURE — 25510000001 IOPAMIDOL PER 1 ML: Performed by: INTERNAL MEDICINE

## 2025-06-03 PROCEDURE — 99152 MOD SED SAME PHYS/QHP 5/>YRS: CPT | Performed by: INTERNAL MEDICINE

## 2025-06-03 PROCEDURE — 85347 COAGULATION TIME ACTIVATED: CPT

## 2025-06-03 PROCEDURE — 25010000002 ONDANSETRON PER 1 MG: Performed by: NURSE PRACTITIONER

## 2025-06-03 PROCEDURE — 63710000001 AMLODIPINE 5 MG TABLET: Performed by: INTERNAL MEDICINE

## 2025-06-03 PROCEDURE — 63710000001 PANTOPRAZOLE 40 MG TABLET DELAYED-RELEASE: Performed by: INTERNAL MEDICINE

## 2025-06-03 PROCEDURE — G0378 HOSPITAL OBSERVATION PER HR: HCPCS

## 2025-06-03 PROCEDURE — 25010000002 FENTANYL CITRATE (PF) 100 MCG/2ML SOLUTION: Performed by: INTERNAL MEDICINE

## 2025-06-03 PROCEDURE — C9600 PERC DRUG-EL COR STENT SING: HCPCS | Performed by: INTERNAL MEDICINE

## 2025-06-03 PROCEDURE — C1887 CATHETER, GUIDING: HCPCS | Performed by: INTERNAL MEDICINE

## 2025-06-03 PROCEDURE — 80048 BASIC METABOLIC PNL TOTAL CA: CPT | Performed by: NURSE PRACTITIONER

## 2025-06-03 PROCEDURE — 71045 X-RAY EXAM CHEST 1 VIEW: CPT

## 2025-06-03 PROCEDURE — 92928 PRQ TCAT PLMT NTRAC ST 1 LES: CPT | Performed by: INTERNAL MEDICINE

## 2025-06-03 PROCEDURE — 25010000002 HYDRALAZINE PER 20 MG: Performed by: NURSE PRACTITIONER

## 2025-06-03 PROCEDURE — 99153 MOD SED SAME PHYS/QHP EA: CPT | Performed by: INTERNAL MEDICINE

## 2025-06-03 PROCEDURE — 63710000001 ASPIRIN 81 MG TABLET DELAYED-RELEASE: Performed by: INTERNAL MEDICINE

## 2025-06-03 PROCEDURE — C1894 INTRO/SHEATH, NON-LASER: HCPCS | Performed by: INTERNAL MEDICINE

## 2025-06-03 PROCEDURE — 25010000002 HEPARIN (PORCINE) PER 1000 UNITS: Performed by: INTERNAL MEDICINE

## 2025-06-03 PROCEDURE — 85027 COMPLETE CBC AUTOMATED: CPT | Performed by: NURSE PRACTITIONER

## 2025-06-03 PROCEDURE — 25810000003 SODIUM CHLORIDE 0.9 % SOLUTION: Performed by: INTERNAL MEDICINE

## 2025-06-03 PROCEDURE — 25010000002 HYDROMORPHONE 1 MG/ML SOLUTION: Performed by: INTERNAL MEDICINE

## 2025-06-03 PROCEDURE — 93458 L HRT ARTERY/VENTRICLE ANGIO: CPT | Performed by: INTERNAL MEDICINE

## 2025-06-03 PROCEDURE — 25010000002 LIDOCAINE 1 % SOLUTION: Performed by: INTERNAL MEDICINE

## 2025-06-03 PROCEDURE — 85610 PROTHROMBIN TIME: CPT | Performed by: NURSE PRACTITIONER

## 2025-06-03 DEVICE — XIENCE SKYPOINT™ EVEROLIMUS ELUTING CORONARY STENT SYSTEM 3.25 MM X 18 MM / RAPID-EXCHANGE
Type: IMPLANTABLE DEVICE | Site: CORONARY | Status: FUNCTIONAL
Brand: XIENCE SKYPOINT™

## 2025-06-03 DEVICE — XIENCE SKYPOINT™ EVEROLIMUS ELUTING CORONARY STENT SYSTEM 2.50 MM X 23 MM / RAPID-EXCHANGE
Type: IMPLANTABLE DEVICE | Site: CORONARY | Status: FUNCTIONAL
Brand: XIENCE SKYPOINT™

## 2025-06-03 RX ORDER — GLIPIZIDE 5 MG/1
1.25 TABLET ORAL
Status: DISCONTINUED | OUTPATIENT
Start: 2025-06-03 | End: 2025-06-04 | Stop reason: HOSPADM

## 2025-06-03 RX ORDER — NEBIVOLOL 10 MG/1
10 TABLET ORAL DAILY
Status: DISCONTINUED | OUTPATIENT
Start: 2025-06-03 | End: 2025-06-04 | Stop reason: HOSPADM

## 2025-06-03 RX ORDER — MONTELUKAST SODIUM 10 MG/1
10 TABLET ORAL DAILY
Status: DISCONTINUED | OUTPATIENT
Start: 2025-06-03 | End: 2025-06-04 | Stop reason: HOSPADM

## 2025-06-03 RX ORDER — HYDROCODONE BITARTRATE AND ACETAMINOPHEN 7.5; 325 MG/1; MG/1
1 TABLET ORAL EVERY 8 HOURS PRN
Refills: 0 | Status: DISCONTINUED | OUTPATIENT
Start: 2025-06-03 | End: 2025-06-04 | Stop reason: HOSPADM

## 2025-06-03 RX ORDER — AMLODIPINE BESYLATE 5 MG/1
10 TABLET ORAL DAILY
Status: DISCONTINUED | OUTPATIENT
Start: 2025-06-03 | End: 2025-06-04 | Stop reason: HOSPADM

## 2025-06-03 RX ORDER — LEVOTHYROXINE SODIUM 100 UG/1
100 TABLET ORAL
Status: DISCONTINUED | OUTPATIENT
Start: 2025-06-04 | End: 2025-06-04 | Stop reason: HOSPADM

## 2025-06-03 RX ORDER — MIDAZOLAM HYDROCHLORIDE 1 MG/ML
INJECTION, SOLUTION INTRAMUSCULAR; INTRAVENOUS
Status: DISCONTINUED | OUTPATIENT
Start: 2025-06-03 | End: 2025-06-03 | Stop reason: HOSPADM

## 2025-06-03 RX ORDER — FENTANYL CITRATE 50 UG/ML
INJECTION, SOLUTION INTRAMUSCULAR; INTRAVENOUS
Status: DISCONTINUED | OUTPATIENT
Start: 2025-06-03 | End: 2025-06-03 | Stop reason: HOSPADM

## 2025-06-03 RX ORDER — CLOPIDOGREL BISULFATE 75 MG/1
75 TABLET ORAL DAILY
Status: DISCONTINUED | OUTPATIENT
Start: 2025-06-04 | End: 2025-06-04 | Stop reason: HOSPADM

## 2025-06-03 RX ORDER — NITROGLYCERIN 0.4 MG/1
0.4 TABLET SUBLINGUAL
Status: DISCONTINUED | OUTPATIENT
Start: 2025-06-03 | End: 2025-06-04 | Stop reason: HOSPADM

## 2025-06-03 RX ORDER — ACETAMINOPHEN 325 MG/1
650 TABLET ORAL EVERY 4 HOURS PRN
Status: DISCONTINUED | OUTPATIENT
Start: 2025-06-03 | End: 2025-06-04 | Stop reason: HOSPADM

## 2025-06-03 RX ORDER — ATORVASTATIN CALCIUM 40 MG/1
40 TABLET, FILM COATED ORAL NIGHTLY
Status: DISCONTINUED | OUTPATIENT
Start: 2025-06-03 | End: 2025-06-04 | Stop reason: HOSPADM

## 2025-06-03 RX ORDER — SODIUM CHLORIDE 9 MG/ML
75 INJECTION, SOLUTION INTRAVENOUS ONCE AS NEEDED
Status: DISPENSED | OUTPATIENT
Start: 2025-06-03 | End: 2025-06-04

## 2025-06-03 RX ORDER — LIDOCAINE HYDROCHLORIDE 10 MG/ML
INJECTION, SOLUTION INFILTRATION; PERINEURAL
Status: DISCONTINUED | OUTPATIENT
Start: 2025-06-03 | End: 2025-06-03 | Stop reason: HOSPADM

## 2025-06-03 RX ORDER — ASPIRIN 81 MG/1
81 TABLET ORAL DAILY
Status: DISCONTINUED | OUTPATIENT
Start: 2025-06-03 | End: 2025-06-04 | Stop reason: HOSPADM

## 2025-06-03 RX ORDER — LISINOPRIL 5 MG/1
5 TABLET ORAL DAILY
Status: DISCONTINUED | OUTPATIENT
Start: 2025-06-03 | End: 2025-06-04 | Stop reason: HOSPADM

## 2025-06-03 RX ORDER — NITROGLYCERIN 5 MG/ML
INJECTION, SOLUTION INTRAVENOUS
Status: DISCONTINUED | OUTPATIENT
Start: 2025-06-03 | End: 2025-06-03 | Stop reason: HOSPADM

## 2025-06-03 RX ORDER — ONDANSETRON 2 MG/ML
4 INJECTION INTRAMUSCULAR; INTRAVENOUS EVERY 6 HOURS PRN
Status: DISCONTINUED | OUTPATIENT
Start: 2025-06-03 | End: 2025-06-04 | Stop reason: HOSPADM

## 2025-06-03 RX ORDER — SODIUM CHLORIDE 9 MG/ML
3 INJECTION, SOLUTION INTRAVENOUS CONTINUOUS
Status: DISPENSED | OUTPATIENT
Start: 2025-06-03 | End: 2025-06-03

## 2025-06-03 RX ORDER — CYCLOBENZAPRINE HCL 10 MG
10 TABLET ORAL 3 TIMES DAILY PRN
Status: DISCONTINUED | OUTPATIENT
Start: 2025-06-03 | End: 2025-06-04 | Stop reason: HOSPADM

## 2025-06-03 RX ORDER — IOPAMIDOL 755 MG/ML
INJECTION, SOLUTION INTRAVASCULAR
Status: DISCONTINUED | OUTPATIENT
Start: 2025-06-03 | End: 2025-06-03 | Stop reason: HOSPADM

## 2025-06-03 RX ORDER — PANTOPRAZOLE SODIUM 40 MG/1
40 TABLET, DELAYED RELEASE ORAL
Status: DISCONTINUED | OUTPATIENT
Start: 2025-06-03 | End: 2025-06-04 | Stop reason: HOSPADM

## 2025-06-03 RX ORDER — HYDRALAZINE HYDROCHLORIDE 20 MG/ML
10 INJECTION INTRAMUSCULAR; INTRAVENOUS EVERY 4 HOURS PRN
Status: DISCONTINUED | OUTPATIENT
Start: 2025-06-03 | End: 2025-06-04 | Stop reason: HOSPADM

## 2025-06-03 RX ORDER — HEPARIN SODIUM 1000 [USP'U]/ML
INJECTION, SOLUTION INTRAVENOUS; SUBCUTANEOUS
Status: DISCONTINUED | OUTPATIENT
Start: 2025-06-03 | End: 2025-06-03 | Stop reason: HOSPADM

## 2025-06-03 RX ADMIN — ONDANSETRON 4 MG: 2 INJECTION, SOLUTION INTRAMUSCULAR; INTRAVENOUS at 18:05

## 2025-06-03 RX ADMIN — HYDRALAZINE HYDROCHLORIDE 10 MG: 20 INJECTION INTRAMUSCULAR; INTRAVENOUS at 18:05

## 2025-06-03 RX ADMIN — NEBIBOLOL 10 MG: 10 TABLET ORAL at 18:05

## 2025-06-03 RX ADMIN — HYDROCODONE BITARTRATE AND ACETAMINOPHEN 1 TABLET: 7.5; 325 TABLET ORAL at 18:05

## 2025-06-03 RX ADMIN — LISINOPRIL 5 MG: 5 TABLET ORAL at 17:26

## 2025-06-03 RX ADMIN — GLIPIZIDE 1.25 MG: 5 TABLET ORAL at 17:27

## 2025-06-03 RX ADMIN — PANTOPRAZOLE SODIUM 40 MG: 40 TABLET, DELAYED RELEASE ORAL at 17:27

## 2025-06-03 RX ADMIN — SODIUM CHLORIDE 3 ML/KG/HR: 9 INJECTION, SOLUTION INTRAVENOUS at 17:38

## 2025-06-03 RX ADMIN — MONTELUKAST 10 MG: 10 TABLET, FILM COATED ORAL at 17:27

## 2025-06-03 RX ADMIN — ASPIRIN 81 MG: 81 TABLET, COATED ORAL at 17:27

## 2025-06-03 RX ADMIN — AMLODIPINE BESYLATE 10 MG: 5 TABLET ORAL at 17:27

## 2025-06-03 NOTE — Clinical Note
First balloon inflation max pressure = 6 osiel. First balloon inflation duration = 6 seconds. Second inflation of balloon - Max pressure = 10 osiel. 2nd Inflation of balloon - Duration = 8 seconds.

## 2025-06-03 NOTE — Clinical Note
From: Kandi Guzman  To: Cynthia Malave  Sent: 1/11/2022 11:04 AM CST  Subject: Fluticasone    Dr. Malave,  My Fluticasone is getting low. A new prescription to [facility] if I am to continue taking it.  Thank you, Mary Grace Guzman   Wire inserted in circumflex.

## 2025-06-03 NOTE — Clinical Note
Second inflation of balloon - Max pressure = 4 osiel. 2nd Inflation of balloon - Duration = 14 seconds.

## 2025-06-03 NOTE — INTERVAL H&P NOTE
H&P updated. The patient was examined and the following changes are noted:  Patient had maximal medical management with Bystolic and amlodipine.  Will schedule for cardiac cath.  Risk benefits alternatives explained.

## 2025-06-04 ENCOUNTER — TRANSCRIBE ORDERS (OUTPATIENT)
Dept: CARDIAC REHAB | Facility: HOSPITAL | Age: 65
End: 2025-06-04
Payer: MEDICARE

## 2025-06-04 VITALS
DIASTOLIC BLOOD PRESSURE: 61 MMHG | HEART RATE: 62 BPM | SYSTOLIC BLOOD PRESSURE: 131 MMHG | TEMPERATURE: 98.1 F | WEIGHT: 147.71 LBS | OXYGEN SATURATION: 94 % | BODY MASS INDEX: 27.18 KG/M2 | HEIGHT: 62 IN | RESPIRATION RATE: 14 BRPM

## 2025-06-04 DIAGNOSIS — Z95.5 STATUS POST CORONARY ARTERY STENT PLACEMENT: Primary | ICD-10-CM

## 2025-06-04 LAB
ANION GAP SERPL CALCULATED.3IONS-SCNC: 11.6 MMOL/L (ref 5–15)
BUN SERPL-MCNC: 12.5 MG/DL (ref 8–23)
BUN/CREAT SERPL: 13.7 (ref 7–25)
CALCIUM SPEC-SCNC: 7.8 MG/DL (ref 8.6–10.5)
CHLORIDE SERPL-SCNC: 100 MMOL/L (ref 98–107)
CHOLEST SERPL-MCNC: 222 MG/DL (ref 0–200)
CO2 SERPL-SCNC: 22.4 MMOL/L (ref 22–29)
CREAT SERPL-MCNC: 0.91 MG/DL (ref 0.57–1)
DEPRECATED RDW RBC AUTO: 40.3 FL (ref 37–54)
EGFRCR SERPLBLD CKD-EPI 2021: 69.3 ML/MIN/1.73
ERYTHROCYTE [DISTWIDTH] IN BLOOD BY AUTOMATED COUNT: 12.9 % (ref 12.3–15.4)
GLUCOSE SERPL-MCNC: 199 MG/DL (ref 65–99)
HBA1C MFR BLD: 8.1 % (ref 4.8–5.6)
HCT VFR BLD AUTO: 34.7 % (ref 34–46.6)
HDLC SERPL-MCNC: 34 MG/DL (ref 40–60)
HGB BLD-MCNC: 11.2 G/DL (ref 12–15.9)
LDLC SERPL CALC-MCNC: 158 MG/DL (ref 0–100)
LDLC/HDLC SERPL: 4.56 {RATIO}
MCH RBC QN AUTO: 27.6 PG (ref 26.6–33)
MCHC RBC AUTO-ENTMCNC: 32.3 G/DL (ref 31.5–35.7)
MCV RBC AUTO: 85.5 FL (ref 79–97)
PLATELET # BLD AUTO: 236 10*3/MM3 (ref 140–450)
PMV BLD AUTO: 11 FL (ref 6–12)
POTASSIUM SERPL-SCNC: 3.6 MMOL/L (ref 3.5–5.2)
RBC # BLD AUTO: 4.06 10*6/MM3 (ref 3.77–5.28)
SODIUM SERPL-SCNC: 134 MMOL/L (ref 136–145)
TRIGL SERPL-MCNC: 165 MG/DL (ref 0–150)
TSH SERPL DL<=0.05 MIU/L-ACNC: 4.74 UIU/ML (ref 0.27–4.2)
VLDLC SERPL-MCNC: 30 MG/DL (ref 5–40)
WBC NRBC COR # BLD AUTO: 7.4 10*3/MM3 (ref 3.4–10.8)

## 2025-06-04 PROCEDURE — A9270 NON-COVERED ITEM OR SERVICE: HCPCS | Performed by: INTERNAL MEDICINE

## 2025-06-04 PROCEDURE — 85027 COMPLETE CBC AUTOMATED: CPT | Performed by: INTERNAL MEDICINE

## 2025-06-04 PROCEDURE — 63710000001 MONTELUKAST 10 MG TABLET: Performed by: INTERNAL MEDICINE

## 2025-06-04 PROCEDURE — 63710000001 CLOPIDOGREL 75 MG TABLET: Performed by: INTERNAL MEDICINE

## 2025-06-04 PROCEDURE — 63710000001 LISINOPRIL 5 MG TABLET: Performed by: INTERNAL MEDICINE

## 2025-06-04 PROCEDURE — 80061 LIPID PANEL: CPT | Performed by: INTERNAL MEDICINE

## 2025-06-04 PROCEDURE — 63710000001 LEVOTHYROXINE 100 MCG TABLET: Performed by: INTERNAL MEDICINE

## 2025-06-04 PROCEDURE — 63710000001 NEBIVOLOL 10 MG TABLET: Performed by: INTERNAL MEDICINE

## 2025-06-04 PROCEDURE — 80048 BASIC METABOLIC PNL TOTAL CA: CPT | Performed by: INTERNAL MEDICINE

## 2025-06-04 PROCEDURE — 63710000001 PANTOPRAZOLE 40 MG TABLET DELAYED-RELEASE: Performed by: INTERNAL MEDICINE

## 2025-06-04 PROCEDURE — 63710000001 MUPIROCIN 2 % OINTMENT: Performed by: INTERNAL MEDICINE

## 2025-06-04 PROCEDURE — 84443 ASSAY THYROID STIM HORMONE: CPT | Performed by: INTERNAL MEDICINE

## 2025-06-04 PROCEDURE — 63710000001 GLIPIZIDE 5 MG TABLET: Performed by: INTERNAL MEDICINE

## 2025-06-04 PROCEDURE — 25010000002 ONDANSETRON PER 1 MG: Performed by: NURSE PRACTITIONER

## 2025-06-04 PROCEDURE — 63710000001 AMLODIPINE 5 MG TABLET: Performed by: INTERNAL MEDICINE

## 2025-06-04 PROCEDURE — 83036 HEMOGLOBIN GLYCOSYLATED A1C: CPT | Performed by: INTERNAL MEDICINE

## 2025-06-04 PROCEDURE — 63710000001 ASPIRIN 81 MG TABLET DELAYED-RELEASE: Performed by: INTERNAL MEDICINE

## 2025-06-04 RX ORDER — ASPIRIN 81 MG/1
81 TABLET ORAL DAILY
Qty: 90 TABLET | Refills: 3 | Status: SHIPPED | OUTPATIENT
Start: 2025-06-04

## 2025-06-04 RX ORDER — ATORVASTATIN CALCIUM 40 MG/1
40 TABLET, FILM COATED ORAL
Qty: 90 TABLET | Refills: 3 | Status: SHIPPED | OUTPATIENT
Start: 2025-06-04

## 2025-06-04 RX ORDER — CLOPIDOGREL BISULFATE 75 MG/1
75 TABLET ORAL DAILY
Qty: 90 TABLET | Refills: 3 | Status: SHIPPED | OUTPATIENT
Start: 2025-06-05

## 2025-06-04 RX ADMIN — MONTELUKAST 10 MG: 10 TABLET, FILM COATED ORAL at 09:13

## 2025-06-04 RX ADMIN — LISINOPRIL 5 MG: 5 TABLET ORAL at 09:13

## 2025-06-04 RX ADMIN — MUPIROCIN 1 APPLICATION: 20 OINTMENT TOPICAL at 02:25

## 2025-06-04 RX ADMIN — GLIPIZIDE 1.25 MG: 5 TABLET ORAL at 09:13

## 2025-06-04 RX ADMIN — ONDANSETRON 4 MG: 2 INJECTION, SOLUTION INTRAMUSCULAR; INTRAVENOUS at 02:25

## 2025-06-04 RX ADMIN — CLOPIDOGREL BISULFATE 75 MG: 75 TABLET, FILM COATED ORAL at 09:13

## 2025-06-04 RX ADMIN — MUPIROCIN 1 APPLICATION: 20 OINTMENT TOPICAL at 09:14

## 2025-06-04 RX ADMIN — ASPIRIN 81 MG: 81 TABLET, COATED ORAL at 09:13

## 2025-06-04 RX ADMIN — AMLODIPINE BESYLATE 10 MG: 5 TABLET ORAL at 09:13

## 2025-06-04 RX ADMIN — PANTOPRAZOLE SODIUM 40 MG: 40 TABLET, DELAYED RELEASE ORAL at 09:13

## 2025-06-04 RX ADMIN — LEVOTHYROXINE SODIUM 100 MCG: 100 TABLET ORAL at 05:04

## 2025-06-04 RX ADMIN — NEBIBOLOL 10 MG: 10 TABLET ORAL at 09:13

## 2025-06-04 NOTE — CONSULTS
"Diabetes Education  Assessment/Teaching    Patient Name:  Shantel Maharaj  YOB: 1960  MRN: [id number]  Admit Date:  6/3/2025      Assessment Date:  6/4/2025  Flowsheet Row Most Recent Value   General Information     Referral From: MD Rickie order  [MD consult for A1c >7.0% and on 5/4/2025 A1c was 8.10%.]   Height 157.5 cm (62\")   Height Method Actual   Weight 67 kg (147 lb 11.3 oz)   Weight Method Standing scale   Pregnancy Assessment    Diabetes History    What type of diabetes do you have? Type 2   Length of Diabetes Diagnosis 1 - 5 years  [Diagnosed February 2022]   Current DM knowledge fair   Have you had diabetes education/teaching in the past? yes   When and where was your diabetes education? Inpatient hospitalization at [facility] on 2/24/2022   Do you test your blood sugar at home? yes   Frequency of checks as often as needs   Meter type Dexcom G7   Who performs the test? patient   Typical readings 150s   Have you had high blood sugar? (>140mg/dl) yes   How often do you have high blood sugar? frequently   Education Preferences    What areas of diabetes would you like to learn about? avoiding high blood sugar, diabetes complications, avoiding low blood sugar   Nutrition Information    Assessment Topics    Problem Solving - Assessment Needs education   Reducing Risk - Assessment Needs education   DM Goals    Problem Solving - Goal Today   Reducing Risk - Goal Today            Flowsheet Row Most Recent Value   DM Education Needs    Meter Has own   Meter Type Other (comment)  [Dexcom G7]   Medication Insulin, Oral, Pen  [At home patient stated that she takes Glipizide 2.5 mg every evening, Rybelsus 7 mg every morning, and Humalog 75/25 30 units before breakfast and supper.]   Problem Solving Hypoglycemia, Hyperglycemia, Signs, Symptoms, Treatment   Reducing Risks A1C testing  [On 5/4/2025 A1c was 8.10%.]   Discharge Plan Home   Motivation Moderate   Teaching Method Discussion, Handouts   Patient Response " Verbalized understanding              Other Comments:  A1c info sheet given with discussion on A1c target and healthy blood sugar range. Patient stated she sees Dr. Swenson for her diabetes care. Patient stated she has a new glucometer at home for back-up but is unsure of the name.   Patient stated she has low blood sugars 2-3X a week usually during the night and has it go as low as 30. Patient stated she takes a spoonful of peanut butter to treat her low blood sugars. Handout given with discussion on hypoglycemia signs, symptoms, and treatment. Instructed patient to  start eating a bedtime snack. Discussed with patient that if low blood sugars continue with eating a bedtime snack to decrease her evening insulin dose by 2 units.   Patient stated she had COVID at [place] and has lost 40 pounds since [place].   Patient has no further questions or concerns related to diabetes at this time.        Electronically signed by:  Afshan Reyes RN  06/04/25 10:48 EDT

## 2025-06-04 NOTE — CASE MANAGEMENT/SOCIAL WORK
Case Management Discharge Note      Final Note: Outpatient in a bed. DC orders in place. No additional service needs identified upon chart review. No high cost meds. On room air. No barriers.           Transportation Services  Private: Car (with family/friend)    Final Discharge Disposition Code: 01 - home or self-care    Alana Haider, RN    Office Phone: [phone]  Office Cell:     [phone]

## 2025-06-04 NOTE — DISCHARGE SUMMARY
"  Date of Discharge:  6/4/2025    Discharge Diagnosis:   Active Hospital Problems    Diagnosis  POA    **Unstable angina [I20.0]  Unknown    S/P drug eluting coronary stent placement [Z95.5]  Not Applicable    Coronary artery disease involving native coronary artery of native heart with unstable angina pectoris [I25.110]  Yes    Hypertension [I10]  Yes    HLD (hyperlipidemia) [E78.5]  Yes      Resolved Hospital Problems   No resolved problems to display.       Presenting Problem/History of Present Illness  Unstable angina [I20.0]  Coronary artery disease involving native coronary artery of native heart with unstable angina pectoris [I25.110]      Hospital Course  Patient is a 65 y.o. female is a patient of Dr. Dinero with symptoms of angina presented for elective cardiac catheterization on 6/3/2025.  Patient underwent drug-eluting stent to the mid Left circumflex and diagnonal branch of LAD.  She was observed overnight without any complications.  Discharge instructions discussed with patient.  She will be discharged on aspirin, plavix, statin, beta blocker, ace, and calcium channel blocker.      Increase statin to 40mg daily     Discharge creatinine is normal at 0.91     Cardiac rehab ordered     Procedures Performed  Procedure(s):  Left Heart Cath  Percutaneous Coronary Intervention  Stent ANIRUDH coronary       Consults:   Consults       No orders found for last 30 day(s).            Pertinent Test Results: labs: reviewed   glucose 199, A1C 8.10  total chol 222 HDL 34,      Ejection Fraction  Lab Results   Component Value Date    EF 64 10/04/2022       Echo EF Estimated  No results found for: \"ECHOEFEST\"    Nuclear Stress Ejection Fraction  No components found for: \"NUCEF\"    Cath Ejection Fraction Quantitative  No results found for: \"CATHEF\"    Condition on Discharge: Stable    Physical Exam at Discharge :    Vital Signs  Temp:  [97.6 °F (36.4 °C)-98.3 °F (36.8 °C)] 98.1 °F (36.7 °C)  Heart Rate:  [60-80] " 65  Resp:  [9-14] 14  BP: ()/(55-96) 118/55  General:  Appears in no acute distress  Eyes: Sclera is anicteric,  conjunctiva is clear   HEENT:  No JVD. Thyroid not visibly enlarged. No mucosal pallor or cyanosis  Respiratory: Respirations regular and unlabored at rest.  CTA  Cardiovascular: S1,S2 Regular rate and rhythm. No murmur, rub or gallop auscultated.  . No pretibial pitting edema  Gastrointestinal: Abdomen nondistended, soft  Musculoskeletal:  No abnormal movements  Extremities: No digital clubbing or cyanosis  Skin: Color pink. Skin warm and dry to touch. No rashes  No xanthoma  Neuro: Alert and awake, no lateralizing deficits appreciated        Discharge Disposition Home  Home or Self Care    Discharge Medications     Discharge Medications        New Medications        Instructions Start Date   aspirin 81 MG EC tablet   81 mg, Oral, Daily      clopidogrel 75 MG tablet  Commonly known as: PLAVIX   75 mg, Oral, Daily   Start Date: June 5, 2025            Changes to Medications        Instructions Start Date   atorvastatin 40 MG tablet  Commonly known as: LIPITOR  What changed:   medication strength  how much to take   40 mg, Oral, Every Night at Bedtime             Continue These Medications        Instructions Start Date   amLODIPine 10 MG tablet  Commonly known as: NORVASC   10 mg, Daily      anastrozole 1 MG tablet  Commonly known as: ARIMIDEX   1 mg, Daily      B-D UF III MINI PEN NEEDLES 31G X 5 MM misc  Generic drug: Insulin Pen Needle   USE 1 (ONE) NEEDLE TWO TIMES DAILY      cyanocobalamin 1000 MCG/ML injection   INJECT 1ML ONCE MONTHLY      cyclobenzaprine 10 MG tablet  Commonly known as: FLEXERIL   10 mg, Oral, 3 Times Daily PRN      Dexcom G7  device       dexlansoprazole 60 MG capsule  Commonly known as: DEXILANT   60 mg, Daily      fenofibrate 145 MG tablet  Commonly known as: TRICOR   145 mg, As Needed      ferrous sulfate 325 (65 FE) MG tablet   325 mg, As Needed      fluticasone  50 MCG/ACT nasal spray  Commonly known as: FLONASE   1 spray, As Needed      FreeStyle Tali 3 Sensor misc   CHANGE SENSOR EVERY 14 DAYS      glipizide 2.5 MG 24 hr tablet  Commonly known as: GLUCOTROL XL   2.5 mg, Nightly      HYDROcodone-acetaminophen 7.5-325 MG per tablet  Commonly known as: NORCO   1 tablet, Oral, Every 8 Hours PRN      Ibuprofen 3 %, Gabapentin 10 %, Baclofen 2 %, lidocaine 4 %, Ketamine HCl 4 %   1-2 g, Topical, 3 to 4 Times Daily      Insulin Lispro Prot & Lispro (75-25) 100 UNIT/ML suspension pen-injector pen  Commonly known as: humaLOG 75-25   Inject 30 Units under the skin into the appropriate area as directed 2 (Two) Times a Day With Meals.      levothyroxine 100 MCG tablet  Commonly known as: SYNTHROID, LEVOTHROID   TAKE 1 (ONE) TABLET DAILY WITH 200MCG TO MAKE 300 MCG      lisinopril 5 MG tablet  Commonly known as: PRINIVIL,ZESTRIL   5 mg, Daily      montelukast 10 MG tablet  Commonly known as: SINGULAIR       nebivolol 10 MG tablet  Commonly known as: BYSTOLIC   1 tablet, Daily      nitroglycerin 0.4 MG SL tablet  Commonly known as: NITROSTAT   1 under the tongue as needed for angina, may repeat q5mins for up three doses      OneTouch Delica Plus Sculsy16L misc   1 each, Not Applicable, Daily, Dx: E11.90      OneTouch Verio test strip  Generic drug: glucose blood   1 each, Other, Daily, Dx: E11.90 Use as instructed      polyethylene glycol 17 GM/SCOOP powder  Commonly known as: MIRALAX   17 g, Oral, Daily PRN      promethazine 25 MG tablet  Commonly known as: PHENERGAN   25 mg, Every 6 Hours PRN      Rybelsus 7 MG tablet  Generic drug: Semaglutide   1 tablet, Daily      Stimulant Laxative 8.6-50 MG per tablet  Generic drug: sennosides-docusate   2 tablets, Oral, 2 Times Daily PRN      sucralfate 1 g tablet  Commonly known as: CARAFATE   1 g, Daily             Stop These Medications      escitalopram 10 MG tablet  Commonly known as: LEXAPRO     gabapentin 100 MG capsule  Commonly known  as: NEURONTIN     omeprazole 40 MG capsule  Commonly known as: priLOSEC     oxybutynin XL 5 MG 24 hr tablet  Commonly known as: DITROPAN-XL     potassium chloride 10 MEQ CR tablet              Discharge Diet: Cardiac diet    Activity at Discharge: Activity as tolerated    Follow-up Appointments: Follow-up with me in 1 month  Future Appointments   Date Time Provider Department Center   6/18/2025  2:45 PM Ramsey Dinero MD [facility]   9/2/2025 10:40 AM Get Dhillon DO [facility]   11/19/2025 11:30 AM [facility] DEVICE CHECK [facility]   11/19/2025 11:45 AM Ramsey Dinero MD [facility]              KERI Roberts  06/04/25  10:43 EDT    Time: Discharge Time Spent:30    Electronically signed by KERI Roberts, 06/04/25, 10:43 AM EDT.

## 2025-06-18 ENCOUNTER — OFFICE VISIT (OUTPATIENT)
Dept: CARDIOLOGY | Facility: CLINIC | Age: 65
End: 2025-06-18
Payer: MEDICARE

## 2025-06-18 VITALS
SYSTOLIC BLOOD PRESSURE: 142 MMHG | BODY MASS INDEX: 27.23 KG/M2 | HEART RATE: 60 BPM | WEIGHT: 148 LBS | HEIGHT: 62 IN | DIASTOLIC BLOOD PRESSURE: 76 MMHG | OXYGEN SATURATION: 98 %

## 2025-06-18 DIAGNOSIS — Z95.5 S/P DRUG ELUTING CORONARY STENT PLACEMENT: ICD-10-CM

## 2025-06-18 DIAGNOSIS — I25.110 CORONARY ARTERY DISEASE INVOLVING NATIVE CORONARY ARTERY OF NATIVE HEART WITH UNSTABLE ANGINA PECTORIS: Primary | ICD-10-CM

## 2025-06-18 DIAGNOSIS — I10 PRIMARY HYPERTENSION: Chronic | ICD-10-CM

## 2025-06-18 DIAGNOSIS — I49.5 SICK SINUS SYNDROME: ICD-10-CM

## 2025-06-18 DIAGNOSIS — Z95.0 PACEMAKER: ICD-10-CM

## 2025-06-18 PROCEDURE — 3077F SYST BP >= 140 MM HG: CPT | Performed by: INTERNAL MEDICINE

## 2025-06-18 PROCEDURE — 1159F MED LIST DOCD IN RCRD: CPT | Performed by: INTERNAL MEDICINE

## 2025-06-18 PROCEDURE — 3078F DIAST BP <80 MM HG: CPT | Performed by: INTERNAL MEDICINE

## 2025-06-18 PROCEDURE — 1160F RVW MEDS BY RX/DR IN RCRD: CPT | Performed by: INTERNAL MEDICINE

## 2025-06-18 PROCEDURE — 99213 OFFICE O/P EST LOW 20 MIN: CPT | Performed by: INTERNAL MEDICINE

## 2025-06-18 NOTE — PROGRESS NOTES
Progress note      Name: Shantel Maharaj ADMIT: (Not on file)   : 1960  PCP: Gris Dominguez MD    MRN: [id number] LOS: 0 days   AGE/SEX: 65 y.o. female  ROOM: Room/bed info not found     Chief Complaint   Patient presents with    Follow-up     F/U CATH AND POST STENT        Subjective       History of present illness  Shantel Maharaj is a 65-year-old female patient, hypertension, dyslipidemia, diabetes, breast cancer bilateral mastectomy, chronically swollen left arm due to lymphedema, sick sinus syndrome dual-chamber pacemaker 2022, coronary artery disease, PCI to circumflex and diagonal on 6/3/2025, here today for follow-up.  He is doing well after PCI, no chest pain no shortness of breath.    Past Medical History:   Diagnosis Date    Arm pain     Breast cancer     Cancer     lt breast -- reocc rt breast 2020    Chronic pain disorder     Colorectal polyp detected on colonoscopy 10/06/2023    COVID-19     Diabetes     Drug therapy     High cholesterol     Hx of radiation therapy     Hypertension     Joint pain     Low back pain     Neuropathy in diabetes     Osteoarthritis     Osteopenia     Thyroid disease      Past Surgical History:   Procedure Laterality Date    APPENDECTOMY      BREAST LUMPECTOMY      2021-- [facility]-- cancer-- reocc    BREAST SURGERY      removal of left breast     CARDIAC CATHETERIZATION N/A 6/3/2025    Procedure: Left Heart Cath;  Surgeon: Camron Baires MD;  Location: [facility] CATH INVASIVE LOCATION;  Service: Cardiovascular;  Laterality: N/A;    CARDIAC CATHETERIZATION N/A 6/3/2025    Procedure: Percutaneous Coronary Intervention;  Surgeon: Camron Baires MD;  Location: [facility] CATH INVASIVE LOCATION;  Service: Cardiovascular;  Laterality: N/A;  Diagonal/ Cx    CARDIAC CATHETERIZATION N/A 6/3/2025    Procedure: Stent ANIRUDH coronary;  Surgeon: Camron Baires MD;  Location: [facility] CATH INVASIVE LOCATION;  Service: Cardiovascular;  Laterality:  N/A;  Diag/ Cx    CARDIAC ELECTROPHYSIOLOGY PROCEDURE N/A 2022    Procedure: Pacemaker DC new-[place];  Surgeon: Ramsey Dinero MD;  Location: [facility] INVASIVE LOCATION;  Service: Cardiology;  Laterality: N/A;     SECTION      x2    GALLBLADDER SURGERY      INSERT / REPLACE / REMOVE PACEMAKER      KNEE SURGERY      left knee - petella broken     OTHER SURGICAL HISTORY      rt arm surgery with shlomo  placement rt arm    SHOULDER SURGERY      broken-lt- in 12 places    WRIST SURGERY      rt - broken     Family History   Problem Relation Age of Onset    Cancer Mother     Breast cancer Mother     Heart disease Father         [place]    Hypertension Father     Breast cancer Maternal Aunt     Breast cancer Maternal Aunt     Breast cancer Maternal Aunt      Social History     Tobacco Use    Smoking status: Former     Current packs/day: 1.00     Types: Cigarettes     Passive exposure: Past    Smokeless tobacco: Never    Tobacco comments:     quit  smoking in    Vaping Use    Vaping status: Never Used   Substance Use Topics    Alcohol use: Never    Drug use: Never       Current Outpatient Medications:     amLODIPine (NORVASC) 10 MG tablet, Take 1 tablet by mouth Daily., Disp: , Rfl:     anastrozole (ARIMIDEX) 1 MG tablet, Take 1 tablet by mouth Daily., Disp: , Rfl:     aspirin 81 MG EC tablet, Take 1 tablet by mouth Daily., Disp: 90 tablet, Rfl: 3    atorvastatin (LIPITOR) 40 MG tablet, Take 1 tablet by mouth every night at bedtime., Disp: 90 tablet, Rfl: 3    B-D UF III MINI PEN NEEDLES 31G X 5 MM misc, USE 1 (ONE) NEEDLE TWO TIMES DAILY, Disp: , Rfl:     clopidogrel (PLAVIX) 75 MG tablet, Take 1 tablet by mouth Daily., Disp: 90 tablet, Rfl: 3    Continuous Glucose  (Dexcom G7 ) device, , Disp: , Rfl:     Continuous Glucose Sensor (FreeStyle Tali 3 Sensor) [facility], CHANGE SENSOR EVERY 14 DAYS, Disp: , Rfl:     cyanocobalamin 1000 MCG/ML injection, INJECT 1ML ONCE MONTHLY, Disp: , Rfl:      cyclobenzaprine (FLEXERIL) 10 MG tablet, Take 1 tablet by mouth 3 (Three) Times a Day As Needed for Muscle Spasms., Disp: 90 tablet, Rfl: 2    dexlansoprazole (DEXILANT) 60 MG capsule, Take 1 capsule by mouth Daily., Disp: , Rfl:     fenofibrate (TRICOR) 145 MG tablet, Take 1 tablet by mouth As Needed., Disp: , Rfl:     ferrous sulfate 325 (65 FE) MG tablet, Take 1 tablet by mouth As Needed., Disp: , Rfl:     fluticasone (FLONASE) 50 MCG/ACT nasal spray, 1 spray by Each Nare route As Needed. As needed, Disp: , Rfl:     glipizide (GLUCOTROL XL) 2.5 MG 24 hr tablet, Take 1 tablet by mouth Every Night., Disp: , Rfl:     HYDROcodone-acetaminophen (NORCO) 7.5-325 MG per tablet, Take 1 tablet by mouth Every 8 (Eight) Hours As Needed for Moderate Pain., Disp: 90 tablet, Rfl: 0    Ibuprofen 3 %, Gabapentin 10 %, Baclofen 2 %, lidocaine 4 %, Ketamine HCl 4 %, Apply 1-2 g topically to the appropriate area as directed 3 (Three) to 4 (Four) times daily., Disp: 90 g, Rfl: 5    Insulin Lispro Prot & Lispro (humaLOG 75-25) (75-25) 100 UNIT/ML suspension pen-injector pen, Inject 30 Units under the skin into the appropriate area as directed 2 (Two) Times a Day With Meals., Disp: , Rfl:     Lancets (OneTouch Delica Plus Edavrp60M) misc, 1 each Daily. Dx: E11.90, Disp: 100 each, Rfl: 2    levothyroxine (SYNTHROID, LEVOTHROID) 100 MCG tablet, TAKE 1 (ONE) TABLET DAILY WITH 200MCG TO MAKE 300 MCG, Disp: , Rfl:     lisinopril (PRINIVIL,ZESTRIL) 5 MG tablet, Take 1 tablet by mouth Daily., Disp: , Rfl:     montelukast (SINGULAIR) 10 MG tablet, , Disp: , Rfl:     nebivolol (BYSTOLIC) 10 MG tablet, Take 1 tablet by mouth Daily., Disp: , Rfl:     nitroglycerin (NITROSTAT) 0.4 MG SL tablet, 1 under the tongue as needed for angina, may repeat q5mins for up three doses, Disp: 100 tablet, Rfl: 11    OneTouch Verio test strip, 1 each by Other route Daily. Dx: E11.90 Use as instructed, Disp: 50 each, Rfl: 2    polyethylene glycol (MIRALAX) 17  GM/SCOOP powder, Take 17 g by mouth Daily As Needed (Use if senna-docusate is ineffective)., Disp: 510 g, Rfl: 0    promethazine (PHENERGAN) 25 MG tablet, Take 1 tablet by mouth Every 6 (Six) Hours As Needed for Nausea., Disp: , Rfl:     Rybelsus 7 MG tablet, Take 7 mg by mouth Daily., Disp: , Rfl:     sennosides-docusate (PERICOLACE) 8.6-50 MG per tablet, Take 2 tablets by mouth 2 (Two) Times a Day As Needed for Constipation., Disp: 60 tablet, Rfl: 0    sucralfate (CARAFATE) 1 g tablet, Take 1 tablet by mouth Daily., Disp: , Rfl:   Allergies:  Valsartan      Physical Exam  VITALS REVIEWED    General:      well developed, in no acute distress.    Head:      normocephalic and atraumatic.    Eyes:      PERRL/EOM intact, conjunctiva and sclera clear with out nystagmus.    Neck:      no masses, thyromegaly,  trachea central with normal respiratory effort and PMI displaced laterally  Lungs:      Clear to auscultation bilaterally  Heart:       Regular rate and rhythm  Msk:      no deformity or scoliosis noted of thoracic or lumbar spine.    Pulses:      pulses normal in all 4 extremities.    Extremities:       No lower extremity edema  Neurologic:      no focal deficits.   alert oriented x3  Skin:      intact without lesions or rashes.    Psych:      alert and cooperative; normal mood and affect; normal attention span and concentration.      Result Review :               Pertinent cardiac workup    Echo 2/22/2022 ejection fraction 60 to 65%  Stress test 10/4/2022 low risk         Procedures        Assessment and Plan      Shantel Maharaj is a 65-year-old female patient, sick sinus syndrome, dual-chamber pacemaker, coronary artery disease, PCI diagonal and circumflex on 6/3/2025, here for follow-up.  Doing well no issues to report, will continue same therapy including aspirin, Plavix, Lipitor, Bystolic.  No A-fib on device interrogation.    Diagnoses and all orders for this visit:    1. Coronary artery disease involving  native coronary artery of native heart with unstable angina pectoris (Primary)    2. S/P drug eluting coronary stent placement    3. Sick sinus syndrome  Overview:  Added automatically from request for surgery [id number]      4. Primary hypertension    5. Pacemaker           No follow-ups on file.  Patient was given instructions and counseling regarding her condition or for health maintenance advice. Please see specific information pulled into the AVS if appropriate.     Electronically signed by Ramsey Dinero MD, 06/18/25, 2:44 PM EDT.

## 2025-06-30 LAB
MC_CV_MDC_IDC_RATE_1: 160
MC_CV_MDC_IDC_ZONE_ID: 1
MDC_IDC_MSMT_BATTERY_REMAINING_LONGEVITY: 78 MO
MDC_IDC_MSMT_BATTERY_REMAINING_PERCENTAGE: 100 %
MDC_IDC_MSMT_BATTERY_STATUS: NORMAL
MDC_IDC_MSMT_LEADCHNL_RA_DTM: NORMAL
MDC_IDC_MSMT_LEADCHNL_RA_IMPEDANCE_VALUE: 499
MDC_IDC_MSMT_LEADCHNL_RA_PACING_THRESHOLD_AMPLITUDE: 0.5
MDC_IDC_MSMT_LEADCHNL_RA_PACING_THRESHOLD_POLARITY: NORMAL
MDC_IDC_MSMT_LEADCHNL_RA_PACING_THRESHOLD_PULSEWIDTH: 0.4
MDC_IDC_MSMT_LEADCHNL_RA_SENSING_INTR_AMPL: 5.4
MDC_IDC_MSMT_LEADCHNL_RV_DTM: NORMAL
MDC_IDC_MSMT_LEADCHNL_RV_IMPEDANCE_VALUE: 608
MDC_IDC_MSMT_LEADCHNL_RV_PACING_THRESHOLD_AMPLITUDE: 1
MDC_IDC_MSMT_LEADCHNL_RV_PACING_THRESHOLD_POLARITY: NORMAL
MDC_IDC_MSMT_LEADCHNL_RV_PACING_THRESHOLD_PULSEWIDTH: 0.4
MDC_IDC_MSMT_LEADCHNL_RV_SENSING_INTR_AMPL: 2.8
MDC_IDC_PG_IMPLANT_DTM: NORMAL
MDC_IDC_PG_MFG: NORMAL
MDC_IDC_PG_MODEL: NORMAL
MDC_IDC_PG_SERIAL: NORMAL
MDC_IDC_PG_TYPE: NORMAL
MDC_IDC_SESS_DTM: NORMAL
MDC_IDC_SESS_TYPE: NORMAL
MDC_IDC_SET_BRADY_AT_MODE_SWITCH_RATE: 170
MDC_IDC_SET_BRADY_LOWRATE: 60
MDC_IDC_SET_BRADY_MAX_SENSOR_RATE: 120
MDC_IDC_SET_BRADY_MAX_TRACKING_RATE: 120
MDC_IDC_SET_BRADY_MODE: NORMAL
MDC_IDC_SET_BRADY_PAV_DELAY: 250
MDC_IDC_SET_BRADY_SAV_DELAY: 250
MDC_IDC_SET_LEADCHNL_RA_PACING_AMPLITUDE: 2.2
MDC_IDC_SET_LEADCHNL_RA_PACING_POLARITY: NORMAL
MDC_IDC_SET_LEADCHNL_RA_PACING_PULSEWIDTH: 0.4
MDC_IDC_SET_LEADCHNL_RA_SENSING_POLARITY: NORMAL
MDC_IDC_SET_LEADCHNL_RA_SENSING_SENSITIVITY: 0.25
MDC_IDC_SET_LEADCHNL_RV_PACING_AMPLITUDE: 2.2
MDC_IDC_SET_LEADCHNL_RV_PACING_POLARITY: NORMAL
MDC_IDC_SET_LEADCHNL_RV_PACING_PULSEWIDTH: 0.4
MDC_IDC_SET_LEADCHNL_RV_SENSING_POLARITY: NORMAL
MDC_IDC_SET_LEADCHNL_RV_SENSING_SENSITIVITY: 0.6
MDC_IDC_SET_ZONE_STATUS: NORMAL
MDC_IDC_SET_ZONE_TYPE: NORMAL
MDC_IDC_STAT_AT_BURDEN_PERCENT: 1
MDC_IDC_STAT_BRADY_RA_PERCENT_PACED: 6
MDC_IDC_STAT_BRADY_RV_PERCENT_PACED: 0

## 2025-07-23 ENCOUNTER — HOSPITAL ENCOUNTER (OUTPATIENT)
Facility: HOSPITAL | Age: 65
Setting detail: OBSERVATION
Discharge: HOME | End: 2025-07-24
Attending: EMERGENCY MEDICINE | Admitting: EMERGENCY MEDICINE
Payer: MEDICARE

## 2025-07-23 ENCOUNTER — APPOINTMENT (OUTPATIENT)
Dept: GENERAL RADIOLOGY | Facility: HOSPITAL | Age: 65
End: 2025-07-23
Payer: MEDICARE

## 2025-07-23 DIAGNOSIS — R07.89 OTHER CHEST PAIN: ICD-10-CM

## 2025-07-23 DIAGNOSIS — R07.89 CHEST HEAVINESS: ICD-10-CM

## 2025-07-23 DIAGNOSIS — R53.83 OTHER FATIGUE: Primary | ICD-10-CM

## 2025-07-23 DIAGNOSIS — R06.09 DYSPNEA ON EXERTION: ICD-10-CM

## 2025-07-23 PROBLEM — R07.9 CHEST PAIN: Status: ACTIVE | Noted: 2025-07-23

## 2025-07-23 LAB
ALBUMIN SERPL-MCNC: 3.9 G/DL (ref 3.5–5.2)
ALBUMIN/GLOB SERPL: 1.4 G/DL
ALP SERPL-CCNC: 88 U/L (ref 39–117)
ALT SERPL W P-5'-P-CCNC: 16 U/L (ref 1–33)
ANION GAP SERPL CALCULATED.3IONS-SCNC: 10.8 MMOL/L (ref 5–15)
AST SERPL-CCNC: 19 U/L (ref 1–32)
BASOPHILS # BLD AUTO: 0.04 10*3/MM3 (ref 0–0.2)
BASOPHILS NFR BLD AUTO: 0.5 % (ref 0–1.5)
BILIRUB SERPL-MCNC: 0.5 MG/DL (ref 0–1.2)
BUN SERPL-MCNC: 14.8 MG/DL (ref 8–23)
BUN/CREAT SERPL: 16.3 (ref 7–25)
CALCIUM SPEC-SCNC: 8.8 MG/DL (ref 8.6–10.5)
CHLORIDE SERPL-SCNC: 99 MMOL/L (ref 98–107)
CO2 SERPL-SCNC: 23.2 MMOL/L (ref 22–29)
CREAT SERPL-MCNC: 0.91 MG/DL (ref 0.57–1)
DEPRECATED RDW RBC AUTO: 39.8 FL (ref 37–54)
EGFRCR SERPLBLD CKD-EPI 2021: 69.3 ML/MIN/1.73
EOSINOPHIL # BLD AUTO: 0.11 10*3/MM3 (ref 0–0.4)
EOSINOPHIL NFR BLD AUTO: 1.5 % (ref 0.3–6.2)
ERYTHROCYTE [DISTWIDTH] IN BLOOD BY AUTOMATED COUNT: 13 % (ref 12.3–15.4)
GEN 5 1HR TROPONIN T REFLEX: 17 NG/L
GLOBULIN UR ELPH-MCNC: 2.8 GM/DL
GLUCOSE SERPL-MCNC: 299 MG/DL (ref 65–99)
HCT VFR BLD AUTO: 33.3 % (ref 34–46.6)
HGB BLD-MCNC: 10.8 G/DL (ref 12–15.9)
IMM GRANULOCYTES # BLD AUTO: 0.02 10*3/MM3 (ref 0–0.05)
IMM GRANULOCYTES NFR BLD AUTO: 0.3 % (ref 0–0.5)
LYMPHOCYTES # BLD AUTO: 2.62 10*3/MM3 (ref 0.7–3.1)
LYMPHOCYTES NFR BLD AUTO: 34.7 % (ref 19.6–45.3)
MCH RBC QN AUTO: 26.9 PG (ref 26.6–33)
MCHC RBC AUTO-ENTMCNC: 32.4 G/DL (ref 31.5–35.7)
MCV RBC AUTO: 83 FL (ref 79–97)
MONOCYTES # BLD AUTO: 0.57 10*3/MM3 (ref 0.1–0.9)
MONOCYTES NFR BLD AUTO: 7.5 % (ref 5–12)
NEUTROPHILS NFR BLD AUTO: 4.19 10*3/MM3 (ref 1.7–7)
NEUTROPHILS NFR BLD AUTO: 55.5 % (ref 42.7–76)
NRBC BLD AUTO-RTO: 0 /100 WBC (ref 0–0.2)
NT-PROBNP SERPL-MCNC: 441 PG/ML (ref 0–900)
PLATELET # BLD AUTO: 232 10*3/MM3 (ref 140–450)
PMV BLD AUTO: 10.8 FL (ref 6–12)
POTASSIUM SERPL-SCNC: 3.4 MMOL/L (ref 3.5–5.2)
PROT SERPL-MCNC: 6.7 G/DL (ref 6–8.5)
RBC # BLD AUTO: 4.01 10*6/MM3 (ref 3.77–5.28)
SODIUM SERPL-SCNC: 133 MMOL/L (ref 136–145)
TROPONIN T % DELTA: 0
TROPONIN T NUMERIC DELTA: 0 NG/L
TROPONIN T SERPL HS-MCNC: 17 NG/L
WBC NRBC COR # BLD AUTO: 7.55 10*3/MM3 (ref 3.4–10.8)

## 2025-07-23 PROCEDURE — 96374 THER/PROPH/DIAG INJ IV PUSH: CPT

## 2025-07-23 PROCEDURE — G0378 HOSPITAL OBSERVATION PER HR: HCPCS

## 2025-07-23 PROCEDURE — 85025 COMPLETE CBC W/AUTO DIFF WBC: CPT | Performed by: NURSE PRACTITIONER

## 2025-07-23 PROCEDURE — 71045 X-RAY EXAM CHEST 1 VIEW: CPT

## 2025-07-23 PROCEDURE — 80053 COMPREHEN METABOLIC PANEL: CPT | Performed by: NURSE PRACTITIONER

## 2025-07-23 PROCEDURE — 25010000002 MORPHINE PER 10 MG: Performed by: EMERGENCY MEDICINE

## 2025-07-23 PROCEDURE — 36415 COLL VENOUS BLD VENIPUNCTURE: CPT

## 2025-07-23 PROCEDURE — 83880 ASSAY OF NATRIURETIC PEPTIDE: CPT | Performed by: NURSE PRACTITIONER

## 2025-07-23 PROCEDURE — 93005 ELECTROCARDIOGRAM TRACING: CPT | Performed by: NURSE PRACTITIONER

## 2025-07-23 PROCEDURE — 84484 ASSAY OF TROPONIN QUANT: CPT | Performed by: NURSE PRACTITIONER

## 2025-07-23 PROCEDURE — 99285 EMERGENCY DEPT VISIT HI MDM: CPT

## 2025-07-23 RX ORDER — POTASSIUM CHLORIDE 1500 MG/1
40 TABLET, EXTENDED RELEASE ORAL ONCE
Status: COMPLETED | OUTPATIENT
Start: 2025-07-23 | End: 2025-07-23

## 2025-07-23 RX ORDER — BISACODYL 5 MG/1
5 TABLET, DELAYED RELEASE ORAL DAILY PRN
Status: DISCONTINUED | OUTPATIENT
Start: 2025-07-23 | End: 2025-07-24 | Stop reason: HOSPADM

## 2025-07-23 RX ORDER — ONDANSETRON 2 MG/ML
4 INJECTION INTRAMUSCULAR; INTRAVENOUS EVERY 6 HOURS PRN
Status: DISCONTINUED | OUTPATIENT
Start: 2025-07-23 | End: 2025-07-24 | Stop reason: HOSPADM

## 2025-07-23 RX ORDER — SODIUM CHLORIDE 0.9 % (FLUSH) 0.9 %
10 SYRINGE (ML) INJECTION AS NEEDED
Status: DISCONTINUED | OUTPATIENT
Start: 2025-07-23 | End: 2025-07-24 | Stop reason: HOSPADM

## 2025-07-23 RX ORDER — NITROGLYCERIN 0.4 MG/1
0.4 TABLET SUBLINGUAL
Status: DISCONTINUED | OUTPATIENT
Start: 2025-07-23 | End: 2025-07-24 | Stop reason: HOSPADM

## 2025-07-23 RX ORDER — POLYETHYLENE GLYCOL 3350 17 G/17G
17 POWDER, FOR SOLUTION ORAL DAILY PRN
Status: DISCONTINUED | OUTPATIENT
Start: 2025-07-23 | End: 2025-07-24 | Stop reason: HOSPADM

## 2025-07-23 RX ORDER — NALOXONE HCL 0.4 MG/ML
0.4 VIAL (ML) INJECTION
Status: DISCONTINUED | OUTPATIENT
Start: 2025-07-23 | End: 2025-07-24 | Stop reason: HOSPADM

## 2025-07-23 RX ORDER — ATORVASTATIN CALCIUM 40 MG/1
40 TABLET, FILM COATED ORAL DAILY
COMMUNITY
Start: 2025-07-11

## 2025-07-23 RX ORDER — NITROGLYCERIN 0.4 MG/1
0.4 TABLET SUBLINGUAL
Status: DISCONTINUED | OUTPATIENT
Start: 2025-07-23 | End: 2025-07-24

## 2025-07-23 RX ORDER — ATORVASTATIN CALCIUM 40 MG/1
40 TABLET, FILM COATED ORAL ONCE
Status: COMPLETED | OUTPATIENT
Start: 2025-07-23 | End: 2025-07-23

## 2025-07-23 RX ORDER — LISINOPRIL 5 MG/1
5 TABLET ORAL ONCE
Status: COMPLETED | OUTPATIENT
Start: 2025-07-23 | End: 2025-07-23

## 2025-07-23 RX ORDER — ANASTROZOLE 1 MG/1
1 TABLET ORAL ONCE
Status: COMPLETED | OUTPATIENT
Start: 2025-07-23 | End: 2025-07-23

## 2025-07-23 RX ORDER — AMOXICILLIN 250 MG
2 CAPSULE ORAL 2 TIMES DAILY PRN
Status: DISCONTINUED | OUTPATIENT
Start: 2025-07-23 | End: 2025-07-24 | Stop reason: HOSPADM

## 2025-07-23 RX ORDER — SODIUM CHLORIDE 0.9 % (FLUSH) 0.9 %
10 SYRINGE (ML) INJECTION EVERY 12 HOURS SCHEDULED
Status: DISCONTINUED | OUTPATIENT
Start: 2025-07-23 | End: 2025-07-24 | Stop reason: HOSPADM

## 2025-07-23 RX ORDER — PROMETHAZINE HYDROCHLORIDE 25 MG/1
25 TABLET ORAL EVERY 6 HOURS PRN
COMMUNITY

## 2025-07-23 RX ORDER — LEVOTHYROXINE SODIUM 150 UG/1
150 TABLET ORAL DAILY
COMMUNITY
Start: 2025-07-22

## 2025-07-23 RX ORDER — SODIUM CHLORIDE 9 MG/ML
40 INJECTION, SOLUTION INTRAVENOUS AS NEEDED
Status: DISCONTINUED | OUTPATIENT
Start: 2025-07-23 | End: 2025-07-24 | Stop reason: HOSPADM

## 2025-07-23 RX ORDER — MORPHINE SULFATE 2 MG/ML
1 INJECTION, SOLUTION INTRAMUSCULAR; INTRAVENOUS EVERY 4 HOURS PRN
Status: DISCONTINUED | OUTPATIENT
Start: 2025-07-23 | End: 2025-07-24 | Stop reason: HOSPADM

## 2025-07-23 RX ORDER — BISACODYL 10 MG
10 SUPPOSITORY, RECTAL RECTAL DAILY PRN
Status: DISCONTINUED | OUTPATIENT
Start: 2025-07-23 | End: 2025-07-24 | Stop reason: HOSPADM

## 2025-07-23 RX ORDER — AMOXICILLIN 250 MG
2 CAPSULE ORAL 2 TIMES DAILY
Status: DISCONTINUED | OUTPATIENT
Start: 2025-07-23 | End: 2025-07-24 | Stop reason: HOSPADM

## 2025-07-23 RX ORDER — ASPIRIN 81 MG/1
243 TABLET, CHEWABLE ORAL ONCE
Status: COMPLETED | OUTPATIENT
Start: 2025-07-23 | End: 2025-07-23

## 2025-07-23 RX ADMIN — ANASTROZOLE 1 MG: 1 TABLET, FILM COATED ORAL at 22:36

## 2025-07-23 RX ADMIN — MORPHINE SULFATE 1 MG: 2 INJECTION, SOLUTION INTRAMUSCULAR; INTRAVENOUS at 22:35

## 2025-07-23 RX ADMIN — NITROGLYCERIN 0.4 MG: 0.4 TABLET SUBLINGUAL at 17:30

## 2025-07-23 RX ADMIN — ASPIRIN 81 MG CHEWABLE TABLET 243 MG: 81 TABLET CHEWABLE at 17:30

## 2025-07-23 RX ADMIN — ATORVASTATIN CALCIUM 40 MG: 40 TABLET, FILM COATED ORAL at 22:35

## 2025-07-23 RX ADMIN — POTASSIUM CHLORIDE 40 MEQ: 1500 TABLET, EXTENDED RELEASE ORAL at 18:51

## 2025-07-23 RX ADMIN — LISINOPRIL 5 MG: 5 TABLET ORAL at 22:35

## 2025-07-23 RX ADMIN — NITROGLYCERIN 1 INCH: 20 OINTMENT TOPICAL at 18:52

## 2025-07-23 NOTE — ED PROVIDER NOTES
Subjective   Chief Complaint   Patient presents with    Weakness - Generalized     Weakness, dizziness, flank pain for one week, recent cardiac stent placement.       History of Present Illness  Patient is a 65-year-old female with a history of recent stent placement, angina, presents to the ED for evaluation of fatigue, chest heaviness, weakness over the past week.  Patient reports she has been increasing tired, she is more fatigued, she has chest heaviness whenever she is up and moving.  Improves when she rests.  Denies any episodes of syncope.  No nausea vomiting.  No fevers.  She does report some dizziness when her she stands up, she reports that her blood pressure previously been dropping when she was standing.  Review of Systems    Past Medical History:   Diagnosis Date    Arm pain     Breast cancer     Cancer     lt breast 2012-- reocc rt breast 12/2020    Chronic pain disorder     Colorectal polyp detected on colonoscopy 10/06/2023    COVID-19     Diabetes     Drug therapy     High cholesterol     Hx of radiation therapy     Hypertension     Joint pain     Low back pain     Neuropathy in diabetes     Osteoarthritis     Osteopenia     Thyroid disease        Allergies   Allergen Reactions    Valsartan Swelling and Anaphylaxis     Swelling of tongue  Angioedema       Past Surgical History:   Procedure Laterality Date    APPENDECTOMY      BREAST LUMPECTOMY      1/2021-- [facility]-- cancer-- reocc    BREAST SURGERY      removal of left breast 2012    CARDIAC CATHETERIZATION N/A 6/3/2025    Procedure: Left Heart Cath;  Surgeon: Camron Baires MD;  Location: [facility] CATH INVASIVE LOCATION;  Service: Cardiovascular;  Laterality: N/A;    CARDIAC CATHETERIZATION N/A 6/3/2025    Procedure: Percutaneous Coronary Intervention;  Surgeon: Camron Baires MD;  Location: [facility] CATH INVASIVE LOCATION;  Service: Cardiovascular;  Laterality: N/A;  Diagonal/ Cx    CARDIAC CATHETERIZATION N/A 6/3/2025    Procedure:  Stent ANIRUDH coronary;  Surgeon: Camron Baires MD;  Location:  [facility] CATH INVASIVE LOCATION;  Service: Cardiovascular;  Laterality: N/A;  Diag/ Cx    CARDIAC ELECTROPHYSIOLOGY PROCEDURE N/A 2022    Procedure: Pacemaker DC new-[place];  Surgeon: Ramsey Dinero MD;  Location:  [facility] CATH INVASIVE LOCATION;  Service: Cardiology;  Laterality: N/A;     SECTION      x2    GALLBLADDER SURGERY      INSERT / REPLACE / REMOVE PACEMAKER      KNEE SURGERY      left knee - petella broken     OTHER SURGICAL HISTORY      rt arm surgery with shlomo  placement rt arm    SHOULDER SURGERY      broken-lt- in 12 places    WRIST SURGERY      rt - broken       Family History   Problem Relation Age of Onset    Cancer Mother     Breast cancer Mother     Heart disease Father         [place]    Hypertension Father     Breast cancer Maternal Aunt     Breast cancer Maternal Aunt     Breast cancer Maternal Aunt        Social History     Socioeconomic History    Marital status:    Tobacco Use    Smoking status: Former     Current packs/day: 1.00     Types: Cigarettes     Passive exposure: Past    Smokeless tobacco: Never    Tobacco comments:     quit  smoking in    Vaping Use    Vaping status: Never Used   Substance and Sexual Activity    Alcohol use: Never    Drug use: Never    Sexual activity: Not Currently     Birth control/protection: None           Objective   Physical Exam  Vitals and nursing note reviewed.   Constitutional:       Appearance: Normal appearance. She is not toxic-appearing.   HENT:      Head: Normocephalic and atraumatic.      Nose: Nose normal.      Mouth/Throat:      Mouth: Mucous membranes are moist.      Pharynx: Oropharynx is clear.   Eyes:      Extraocular Movements: Extraocular movements intact.      Conjunctiva/sclera: Conjunctivae normal.      Pupils: Pupils are equal, round, and reactive to light.   Cardiovascular:      Rate and Rhythm: Normal rate and regular rhythm.      Heart  sounds: Normal heart sounds. No murmur heard.     No friction rub. No gallop.   Pulmonary:      Effort: Pulmonary effort is normal.      Breath sounds: Normal breath sounds.   Abdominal:      General: Bowel sounds are normal.      Palpations: Abdomen is soft.   Musculoskeletal:      Cervical back: Normal range of motion and neck supple.      Right lower leg: Edema present.      Left lower leg: Edema present.   Skin:     General: Skin is warm and dry.      Capillary Refill: Capillary refill takes less than 2 seconds.      Findings: No erythema or rash.   Neurological:      General: No focal deficit present.      Mental Status: She is alert and oriented to person, place, and time.         Procedures           ED Course      XR Chest 1 View   Final Result   Impression:   No acute cardiopulmonary disease         Electronically Signed: Jared Sharma MD     7/23/2025 7:01 PM EDT     Workstation ID: [id number]                                                         Medical Decision Making  Amount and/or Complexity of Data Reviewed  Labs: ordered.  Radiology: ordered.  ECG/medicine tests: ordered.    Risk  OTC drugs.  Prescription drug management.  Decision regarding hospitalization.    Appropriate PPE worn during patient interactions.    Chart Review: 6/4/2025, stable angina, stent placement.  Stent placed to the circumflex, LAD.  PCP visit today    EKG sinus rhythm rate of 67, , QTc 472.  No acute ST changes.  Compared to 2/25/2022.  Interpreted per ED attending physician, Dr. Beard.     Pt had the above evaluation.   Differential diagnoses considered for patient chief complaint and presentation, this list is not all inclusive of diagnoses considered: CHF, ACS, orthostatic hypotension, NSTEMI.   She was not orthostatic.  Initial troponin 17, repeat 17.  CMP CBC reviewed.  Patient was given nitroglycerin, did have realization of discomfort.  She was placed on Nitropaste.  Was given aspirin.  No evidence for ACS at  this time.  Will place in ED observation, will consult cardiology as patient just had stents placed last month.    I discussed my findings and plan of care with the patient, she agrees    Note Disclaimer: At [facility], we believe that sharing information builds trust and better relationships. You are receiving this note because you recently visited [facility]. It is possible you will see health information before a provider has talked with you about it. This kind of information can be easy to misunderstand. To help you fully understand what it means for your health, we urge you to discuss this note with your provider  Note dictated utilizing Dragon Dictation.          Final diagnoses:   Other fatigue   Chest heaviness   Dyspnea on exertion       ED Disposition  ED Disposition       ED Disposition   Decision to Admit    Condition   --    Comment   --               No follow-up provider specified.       Medication List      No changes were made to your prescriptions during this visit.            Melida Josue, KERI  07/24/25 0127

## 2025-07-24 ENCOUNTER — APPOINTMENT (OUTPATIENT)
Dept: CT IMAGING | Facility: HOSPITAL | Age: 65
End: 2025-07-24
Payer: MEDICARE

## 2025-07-24 VITALS
WEIGHT: 147.93 LBS | SYSTOLIC BLOOD PRESSURE: 139 MMHG | BODY MASS INDEX: 27.22 KG/M2 | RESPIRATION RATE: 14 BRPM | HEART RATE: 60 BPM | OXYGEN SATURATION: 96 % | TEMPERATURE: 98.2 F | HEIGHT: 62 IN | DIASTOLIC BLOOD PRESSURE: 72 MMHG

## 2025-07-24 LAB
ANION GAP SERPL CALCULATED.3IONS-SCNC: 10.2 MMOL/L (ref 5–15)
BASOPHILS # BLD AUTO: 0.05 10*3/MM3 (ref 0–0.2)
BASOPHILS NFR BLD AUTO: 0.9 % (ref 0–1.5)
BUN SERPL-MCNC: 13.9 MG/DL (ref 8–23)
BUN/CREAT SERPL: 16.2 (ref 7–25)
CALCIUM SPEC-SCNC: 8.6 MG/DL (ref 8.6–10.5)
CHLORIDE SERPL-SCNC: 104 MMOL/L (ref 98–107)
CO2 SERPL-SCNC: 23.8 MMOL/L (ref 22–29)
CREAT SERPL-MCNC: 0.86 MG/DL (ref 0.57–1)
DEPRECATED RDW RBC AUTO: 40.7 FL (ref 37–54)
EGFRCR SERPLBLD CKD-EPI 2021: 74.2 ML/MIN/1.73
EOSINOPHIL # BLD AUTO: 0.15 10*3/MM3 (ref 0–0.4)
EOSINOPHIL NFR BLD AUTO: 2.8 % (ref 0.3–6.2)
ERYTHROCYTE [DISTWIDTH] IN BLOOD BY AUTOMATED COUNT: 13.2 % (ref 12.3–15.4)
GLUCOSE BLDC GLUCOMTR-MCNC: 171 MG/DL (ref 70–105)
GLUCOSE BLDC GLUCOMTR-MCNC: 176 MG/DL (ref 70–105)
GLUCOSE SERPL-MCNC: 191 MG/DL (ref 65–99)
HCT VFR BLD AUTO: 30.9 % (ref 34–46.6)
HGB BLD-MCNC: 10 G/DL (ref 12–15.9)
IMM GRANULOCYTES # BLD AUTO: 0 10*3/MM3 (ref 0–0.05)
IMM GRANULOCYTES NFR BLD AUTO: 0 % (ref 0–0.5)
LYMPHOCYTES # BLD AUTO: 2.43 10*3/MM3 (ref 0.7–3.1)
LYMPHOCYTES NFR BLD AUTO: 45.8 % (ref 19.6–45.3)
MCH RBC QN AUTO: 27.2 PG (ref 26.6–33)
MCHC RBC AUTO-ENTMCNC: 32.4 G/DL (ref 31.5–35.7)
MCV RBC AUTO: 84.2 FL (ref 79–97)
MONOCYTES # BLD AUTO: 0.39 10*3/MM3 (ref 0.1–0.9)
MONOCYTES NFR BLD AUTO: 7.4 % (ref 5–12)
NEUTROPHILS NFR BLD AUTO: 2.28 10*3/MM3 (ref 1.7–7)
NEUTROPHILS NFR BLD AUTO: 43.1 % (ref 42.7–76)
NRBC BLD AUTO-RTO: 0 /100 WBC (ref 0–0.2)
PLATELET # BLD AUTO: 199 10*3/MM3 (ref 140–450)
PMV BLD AUTO: 11.4 FL (ref 6–12)
POTASSIUM SERPL-SCNC: 4 MMOL/L (ref 3.5–5.2)
QT INTERVAL: 447 MS
QT INTERVAL: 452 MS
QTC INTERVAL: 452 MS
QTC INTERVAL: 472 MS
RBC # BLD AUTO: 3.67 10*6/MM3 (ref 3.77–5.28)
SODIUM SERPL-SCNC: 138 MMOL/L (ref 136–145)
TROPONIN T SERPL HS-MCNC: 18 NG/L
TSH SERPL DL<=0.05 MIU/L-ACNC: 5.47 UIU/ML (ref 0.27–4.2)
WBC NRBC COR # BLD AUTO: 5.3 10*3/MM3 (ref 3.4–10.8)

## 2025-07-24 PROCEDURE — 71250 CT THORAX DX C-: CPT

## 2025-07-24 PROCEDURE — G0378 HOSPITAL OBSERVATION PER HR: HCPCS

## 2025-07-24 PROCEDURE — 25010000002 MORPHINE PER 10 MG: Performed by: EMERGENCY MEDICINE

## 2025-07-24 PROCEDURE — 99213 OFFICE O/P EST LOW 20 MIN: CPT | Performed by: NURSE PRACTITIONER

## 2025-07-24 PROCEDURE — 84443 ASSAY THYROID STIM HORMONE: CPT | Performed by: PHYSICIAN ASSISTANT

## 2025-07-24 PROCEDURE — 82948 REAGENT STRIP/BLOOD GLUCOSE: CPT

## 2025-07-24 PROCEDURE — 84484 ASSAY OF TROPONIN QUANT: CPT | Performed by: PHYSICIAN ASSISTANT

## 2025-07-24 PROCEDURE — 85025 COMPLETE CBC W/AUTO DIFF WBC: CPT | Performed by: EMERGENCY MEDICINE

## 2025-07-24 PROCEDURE — 96376 TX/PRO/DX INJ SAME DRUG ADON: CPT

## 2025-07-24 PROCEDURE — 97161 PT EVAL LOW COMPLEX 20 MIN: CPT

## 2025-07-24 PROCEDURE — 80048 BASIC METABOLIC PNL TOTAL CA: CPT | Performed by: EMERGENCY MEDICINE

## 2025-07-24 PROCEDURE — 63710000001 INSULIN LISPRO (HUMAN) PER 5 UNITS: Performed by: PHYSICIAN ASSISTANT

## 2025-07-24 PROCEDURE — 93005 ELECTROCARDIOGRAM TRACING: CPT | Performed by: EMERGENCY MEDICINE

## 2025-07-24 PROCEDURE — 93010 ELECTROCARDIOGRAM REPORT: CPT | Performed by: INTERNAL MEDICINE

## 2025-07-24 PROCEDURE — 82948 REAGENT STRIP/BLOOD GLUCOSE: CPT | Performed by: PHYSICIAN ASSISTANT

## 2025-07-24 RX ORDER — NICOTINE POLACRILEX 4 MG
15 LOZENGE BUCCAL
Status: DISCONTINUED | OUTPATIENT
Start: 2025-07-24 | End: 2025-07-24 | Stop reason: HOSPADM

## 2025-07-24 RX ORDER — ANASTROZOLE 1 MG/1
1 TABLET ORAL NIGHTLY
Status: DISCONTINUED | OUTPATIENT
Start: 2025-07-24 | End: 2025-07-24 | Stop reason: HOSPADM

## 2025-07-24 RX ORDER — ISOSORBIDE MONONITRATE 30 MG/1
30 TABLET, EXTENDED RELEASE ORAL
Status: DISCONTINUED | OUTPATIENT
Start: 2025-07-24 | End: 2025-07-24

## 2025-07-24 RX ORDER — FENOFIBRATE 145 MG/1
145 TABLET, FILM COATED ORAL DAILY
Status: DISCONTINUED | OUTPATIENT
Start: 2025-07-24 | End: 2025-07-24 | Stop reason: HOSPADM

## 2025-07-24 RX ORDER — NEBIVOLOL 10 MG/1
10 TABLET ORAL NIGHTLY
Status: DISCONTINUED | OUTPATIENT
Start: 2025-07-24 | End: 2025-07-24 | Stop reason: HOSPADM

## 2025-07-24 RX ORDER — ATORVASTATIN CALCIUM 40 MG/1
40 TABLET, FILM COATED ORAL NIGHTLY
Status: DISCONTINUED | OUTPATIENT
Start: 2025-07-24 | End: 2025-07-24 | Stop reason: HOSPADM

## 2025-07-24 RX ORDER — CYCLOBENZAPRINE HCL 10 MG
10 TABLET ORAL 3 TIMES DAILY PRN
Status: DISCONTINUED | OUTPATIENT
Start: 2025-07-24 | End: 2025-07-24 | Stop reason: HOSPADM

## 2025-07-24 RX ORDER — LEVOTHYROXINE SODIUM 75 UG/1
150 TABLET ORAL EVERY MORNING
Status: DISCONTINUED | OUTPATIENT
Start: 2025-07-24 | End: 2025-07-24 | Stop reason: HOSPADM

## 2025-07-24 RX ORDER — CLOPIDOGREL BISULFATE 75 MG/1
75 TABLET ORAL DAILY
Status: DISCONTINUED | OUTPATIENT
Start: 2025-07-24 | End: 2025-07-24 | Stop reason: HOSPADM

## 2025-07-24 RX ORDER — IBUPROFEN 600 MG/1
1 TABLET ORAL
Status: DISCONTINUED | OUTPATIENT
Start: 2025-07-24 | End: 2025-07-24 | Stop reason: HOSPADM

## 2025-07-24 RX ORDER — PROMETHAZINE HYDROCHLORIDE 25 MG/1
25 TABLET ORAL EVERY 6 HOURS PRN
Status: DISCONTINUED | OUTPATIENT
Start: 2025-07-24 | End: 2025-07-24 | Stop reason: HOSPADM

## 2025-07-24 RX ORDER — PANTOPRAZOLE SODIUM 40 MG/1
40 TABLET, DELAYED RELEASE ORAL
Status: DISCONTINUED | OUTPATIENT
Start: 2025-07-24 | End: 2025-07-24 | Stop reason: HOSPADM

## 2025-07-24 RX ORDER — METHYLPREDNISOLONE 4 MG/1
TABLET ORAL
Qty: 21 TABLET | Refills: 0 | Status: SHIPPED | OUTPATIENT
Start: 2025-07-24

## 2025-07-24 RX ORDER — FERROUS SULFATE 325(65) MG
325 TABLET ORAL
Status: DISCONTINUED | OUTPATIENT
Start: 2025-07-24 | End: 2025-07-24 | Stop reason: HOSPADM

## 2025-07-24 RX ORDER — INSULIN LISPRO 100 [IU]/ML
2-9 INJECTION, SOLUTION INTRAVENOUS; SUBCUTANEOUS
Status: DISCONTINUED | OUTPATIENT
Start: 2025-07-24 | End: 2025-07-24 | Stop reason: HOSPADM

## 2025-07-24 RX ORDER — ASPIRIN 81 MG/1
81 TABLET ORAL DAILY
Status: DISCONTINUED | OUTPATIENT
Start: 2025-07-24 | End: 2025-07-24 | Stop reason: HOSPADM

## 2025-07-24 RX ORDER — HYDROCODONE BITARTRATE AND ACETAMINOPHEN 7.5; 325 MG/1; MG/1
1 TABLET ORAL EVERY 8 HOURS PRN
Refills: 0 | Status: DISCONTINUED | OUTPATIENT
Start: 2025-07-24 | End: 2025-07-24 | Stop reason: HOSPADM

## 2025-07-24 RX ORDER — LIDOCAINE 50 MG/G
1 PATCH TOPICAL EVERY 24 HOURS
Qty: 5 EACH | Refills: 0 | Status: SHIPPED | OUTPATIENT
Start: 2025-07-24

## 2025-07-24 RX ORDER — DEXTROSE MONOHYDRATE 25 G/50ML
25 INJECTION, SOLUTION INTRAVENOUS
Status: DISCONTINUED | OUTPATIENT
Start: 2025-07-24 | End: 2025-07-24 | Stop reason: HOSPADM

## 2025-07-24 RX ORDER — CYCLOBENZAPRINE HCL 10 MG
10 TABLET ORAL 2 TIMES DAILY PRN
Qty: 6 TABLET | Refills: 0 | Status: SHIPPED | OUTPATIENT
Start: 2025-07-24

## 2025-07-24 RX ORDER — LISINOPRIL 5 MG/1
5 TABLET ORAL NIGHTLY
Status: DISCONTINUED | OUTPATIENT
Start: 2025-07-24 | End: 2025-07-24 | Stop reason: HOSPADM

## 2025-07-24 RX ADMIN — INSULIN LISPRO 2 UNITS: 100 INJECTION, SOLUTION INTRAVENOUS; SUBCUTANEOUS at 09:13

## 2025-07-24 RX ADMIN — Medication 10 ML: at 07:58

## 2025-07-24 RX ADMIN — FERROUS SULFATE TAB 325 MG (65 MG ELEMENTAL FE) 325 MG: 325 (65 FE) TAB at 09:12

## 2025-07-24 RX ADMIN — ASPIRIN 81 MG: 81 TABLET, COATED ORAL at 09:12

## 2025-07-24 RX ADMIN — FENOFIBRATE 145 MG: 145 TABLET ORAL at 09:12

## 2025-07-24 RX ADMIN — CLOPIDOGREL BISULFATE 75 MG: 75 TABLET, FILM COATED ORAL at 09:12

## 2025-07-24 RX ADMIN — MORPHINE SULFATE 1 MG: 2 INJECTION, SOLUTION INTRAMUSCULAR; INTRAVENOUS at 03:57

## 2025-07-24 RX ADMIN — Medication 10 ML: at 03:57

## 2025-07-24 RX ADMIN — PANTOPRAZOLE SODIUM 40 MG: 40 TABLET, DELAYED RELEASE ORAL at 09:13

## 2025-07-24 RX ADMIN — LEVOTHYROXINE SODIUM 150 MCG: 0.07 TABLET ORAL at 09:12

## 2025-07-24 NOTE — PLAN OF CARE
Problem: Adult Inpatient Plan of Care  Goal: Plan of Care Review  Outcome: Met  Goal: Patient-Specific Goal (Individualized)  Outcome: Met  Goal: Absence of Hospital-Acquired Illness or Injury  Outcome: Met  Intervention: Identify and Manage Fall Risk  Recent Flowsheet Documentation  Taken 7/24/2025 0800 by Debby Lau RN  Safety Promotion/Fall Prevention:   safety round/check completed   room organization consistent   nonskid shoes/slippers when out of bed   fall prevention program maintained   clutter free environment maintained  Intervention: Prevent Skin Injury  Recent Flowsheet Documentation  Taken 7/24/2025 0800 by Debby Lau RN  Body Position:   right   side-lying  Skin Protection: protective footwear used  Intervention: Prevent and Manage VTE (Venous Thromboembolism) Risk  Recent Flowsheet Documentation  Taken 7/24/2025 0800 by Debby Lau RN  VTE Prevention/Management: (ambulatory) other (see comments)  Intervention: Prevent Infection  Recent Flowsheet Documentation  Taken 7/24/2025 0800 by Debby Lau RN  Infection Prevention: single patient room provided  Goal: Optimal Comfort and Wellbeing  Outcome: Met  Intervention: Provide Person-Centered Care  Recent Flowsheet Documentation  Taken 7/24/2025 0800 by Debby Lau RN  Trust Relationship/Rapport:   care explained   choices provided   emotional support provided   empathic listening provided   questions answered   questions encouraged   reassurance provided   thoughts/feelings acknowledged  Goal: Readiness for Transition of Care  Outcome: Met     Problem: Comorbidity Management  Goal: Blood Glucose Level Within Target Range  Outcome: Met  Intervention: Monitor and Manage Glycemia  Recent Flowsheet Documentation  Taken 7/24/2025 0800 by Debby Lau RN  Medication Review/Management: medications reviewed  Goal: Blood Pressure in Desired Range  Outcome: Met  Intervention: Maintain Blood Pressure Management  Recent Flowsheet  Documentation  Taken 7/24/2025 0800 by Debby Lau RN  Medication Review/Management: medications reviewed  Goal: Maintenance of Osteoarthritis Symptom Control  Outcome: Met  Intervention: Maintain Osteoarthritis Symptom Control  Recent Flowsheet Documentation  Taken 7/24/2025 0800 by Debby Lau RN  Activity Management: activity encouraged  Medication Review/Management: medications reviewed     Problem: Chest Pain  Goal: Resolution of Chest Pain Symptoms  Outcome: Met     Problem: Fatigue  Goal: Improved Activity Tolerance  Outcome: Met  Intervention: Promote Improved Energy  Recent Flowsheet Documentation  Taken 7/24/2025 0800 by Debby Lau RN  Activity Management: activity encouraged  Environmental Support:   calm environment promoted   rest periods encouraged  Fatigue Management: paced activity encouraged  Sleep/Rest Enhancement: regular sleep/rest pattern promoted   Goal Outcome Evaluation:

## 2025-07-24 NOTE — PLAN OF CARE
Goal Outcome Evaluation:      AOX4, c/o chest pain 7/10 pain medication administered, appears to have eased the pain.  NPO after midnight for Cardiology consult. Pt an assist x 1 to BSC r/t weakness.

## 2025-07-24 NOTE — CASE MANAGEMENT/SOCIAL WORK
Case Management Discharge Note      Final Note: Routine home                    Transportation Services  Transportation: Private Transportation    Final Discharge Disposition Code: 01 - home or self-care

## 2025-07-24 NOTE — CONSULTS
[facility]   CONSULT NOTE    Referring Provider: Keenan Perez MD    Reason for Consultation: chest pain      Patient Care Team:  Gris Dominguez MD as PCP - General (Family Medicine)  Ramsey Dinero MD as Consulting Physician (Cardiology)      Chief complaint: chest pain    History of present illness:  Shantel Maharaj is a 65 y.o. female with past medical history of  PCI to Lcx and diagonal LAD on 6/3/2025, diabetes, hyperlipidemia, thyroid disease, dual-chamber pacemaker who presented to the ED with chest pain and fatigue. Patient reports have midsternal chest discomfort and dizziness for 1 week. She states she has trouble walking due to her dizziness. She reports having some congestion and cough recently. She also has pain in her left side when taking a deep breath.  Patient denies current chest pain, shortness of breath, edema, or palpitations.    ROS  As above in HPI    History  Past Medical History:   Diagnosis Date    Arm pain     Breast cancer     Cancer     lt breast 2012-- reocc rt breast 12/2020    Chronic pain disorder     Colorectal polyp detected on colonoscopy 10/06/2023    COVID-19     Diabetes     Drug therapy     High cholesterol     Hx of radiation therapy     Hypertension     Joint pain     Low back pain     Neuropathy in diabetes     Osteoarthritis     Osteopenia     Thyroid disease        Past Surgical History:   Procedure Laterality Date    APPENDECTOMY      BREAST LUMPECTOMY      1/2021-- [facility]-- cancer-- reocc    BREAST SURGERY      removal of left breast 2012    CARDIAC CATHETERIZATION N/A 6/3/2025    Procedure: Left Heart Cath;  Surgeon: Camron Baires MD;  Location: [facility] CATH INVASIVE LOCATION;  Service: Cardiovascular;  Laterality: N/A;    CARDIAC CATHETERIZATION N/A 6/3/2025    Procedure: Percutaneous Coronary Intervention;  Surgeon: Camron Baires MD;  Location: [facility] CATH INVASIVE LOCATION;  Service: Cardiovascular;   Laterality: N/A;  Diagonal/ Cx    CARDIAC CATHETERIZATION N/A 6/3/2025    Procedure: Stent ANIRUDH coronary;  Surgeon: Camron Baires MD;  Location:  [facility] CATH INVASIVE LOCATION;  Service: Cardiovascular;  Laterality: N/A;  Diag/ Cx    CARDIAC ELECTROPHYSIOLOGY PROCEDURE N/A 2022    Procedure: Pacemaker DC new-[place];  Surgeon: Ramsey Dinero MD;  Location:  [facility] CATH INVASIVE LOCATION;  Service: Cardiology;  Laterality: N/A;     SECTION      x2    GALLBLADDER SURGERY      INSERT / REPLACE / REMOVE PACEMAKER      KNEE SURGERY      left knee - petella broken     OTHER SURGICAL HISTORY      rt arm surgery with shlomo  placement rt arm    SHOULDER SURGERY      broken-lt- in 12 places    WRIST SURGERY      rt - broken       Family History   Problem Relation Age of Onset    Cancer Mother     Breast cancer Mother     Heart disease Father         Matthias    Hypertension Father     Breast cancer Maternal Aunt     Breast cancer Maternal Aunt     Breast cancer Maternal Aunt        Social History     Tobacco Use    Smoking status: Former     Current packs/day: 1.00     Types: Cigarettes     Passive exposure: Past    Smokeless tobacco: Never    Tobacco comments:     quit  smoking in    Vaping Use    Vaping status: Never Used   Substance Use Topics    Alcohol use: Never    Drug use: Never        Medications Prior to Admission   Medication Sig Dispense Refill Last Dose/Taking    anastrozole (ARIMIDEX) 1 MG tablet Take 1 tablet by mouth Every Night.   2025 Evening    aspirin 81 MG EC tablet Take 1 tablet by mouth Daily. 90 tablet 3 2025 Morning    atorvastatin (LIPITOR) 40 MG tablet Take 1 tablet by mouth every night at bedtime. 90 tablet 3 2025 Bedtime    atorvastatin (LIPITOR) 40 MG tablet Take 1 tablet by mouth Daily.   Taking    clopidogrel (PLAVIX) 75 MG tablet Take 1 tablet by mouth Daily. 90 tablet 3 2025 Morning    Continuous Glucose  (Dexcom G7 ) device     Taking    Continuous Glucose Sensor (FreeStyle Tali 3 Sensor) misc CHANGE SENSOR EVERY 14 DAYS   7/23/2025    cyanocobalamin 1000 MCG/ML injection INJECT 1ML ONCE MONTHLY   Taking    cyclobenzaprine (FLEXERIL) 10 MG tablet Take 1 tablet by mouth 3 (Three) Times a Day As Needed for Muscle Spasms. 90 tablet 2 Taking As Needed    dexlansoprazole (DEXILANT) 60 MG capsule Take 1 capsule by mouth Daily As Needed.   Taking As Needed    fenofibrate (TRICOR) 145 MG tablet Take 1 tablet by mouth As Needed.   Taking As Needed    ferrous sulfate 325 (65 FE) MG tablet Take 1 tablet by mouth As Needed.   Taking As Needed    fluticasone (FLONASE) 50 MCG/ACT nasal spray 1 spray by Each Nare route As Needed. As needed   Taking As Needed    HYDROcodone-acetaminophen (NORCO) 7.5-325 MG per tablet Take 1 tablet by mouth Every 8 (Eight) Hours As Needed for Moderate Pain. 90 tablet 0 Taking As Needed    Ibuprofen 3 %, Gabapentin 10 %, Baclofen 2 %, lidocaine 4 %, Ketamine HCl 4 % Apply 1-2 g topically to the appropriate area as directed 3 (Three) to 4 (Four) times daily. 90 g 5 7/22/2025 Evening    Insulin Lispro Prot & Lispro (humaLOG 75-25) (75-25) 100 UNIT/ML suspension pen-injector pen Inject 30 Units under the skin into the appropriate area as directed 2 (Two) Times a Day With Meals.   7/23/2025 Morning    levothyroxine (SYNTHROID, LEVOTHROID) 150 MCG tablet Take 1 tablet by mouth Every Morning.   7/23/2025 Morning    levothyroxine (SYNTHROID, LEVOTHROID) 150 MCG tablet Take 1 tablet by mouth Daily.   Taking    lisinopril (PRINIVIL,ZESTRIL) 5 MG tablet Take 1 tablet by mouth Every Night.   7/22/2025 Evening    nebivolol (BYSTOLIC) 10 MG tablet Take 1 tablet by mouth Every Night.   7/22/2025 Evening    nitroglycerin (NITROSTAT) 0.4 MG SL tablet 1 under the tongue as needed for angina, may repeat q5mins for up three doses 100 tablet 11 Taking    polyethylene glycol (MIRALAX) 17 GM/SCOOP powder Take 17 g by mouth Daily As Needed (Use  "if senna-docusate is ineffective). 510 g 0 Taking As Needed    promethazine (PHENERGAN) 25 MG tablet Take 1 tablet by mouth Every 6 (Six) Hours As Needed for Nausea.   Taking As Needed    amLODIPine (NORVASC) 10 MG tablet Take 1 tablet by mouth Daily.       glipizide (GLUCOTROL XL) 5 MG ER tablet Take 1 tablet by mouth Every Night.       promethazine (PHENERGAN) 25 MG tablet Take 1 tablet by mouth Every 6 (Six) Hours As Needed.       Rybelsus 7 MG tablet Take 7 mg by mouth Daily.            Valsartan    Scheduled Meds:  anastrozole, 1 mg, Oral, Nightly  aspirin, 81 mg, Oral, Daily  atorvastatin, 40 mg, Oral, Nightly  clopidogrel, 75 mg, Oral, Daily  fenofibrate, 145 mg, Oral, Daily  ferrous sulfate, 325 mg, Oral, Daily With Breakfast  insulin lispro, 2-9 Units, Subcutaneous, 4x Daily AC & at Bedtime  levothyroxine, 150 mcg, Oral, QAM  lisinopril, 5 mg, Oral, Nightly  nebivolol, 10 mg, Oral, Nightly  pantoprazole, 40 mg, Oral, BID AC  senna-docusate sodium, 2 tablet, Oral, BID  sodium chloride, 10 mL, Intravenous, Q12H        Continuous Infusions:       PRN Meds:    senna-docusate sodium **AND** polyethylene glycol **AND** bisacodyl **AND** bisacodyl    senna-docusate sodium **AND** polyethylene glycol **AND** bisacodyl **AND** bisacodyl    cyclobenzaprine    dextrose    dextrose    glucagon (human recombinant)    HYDROcodone-acetaminophen    melatonin    Morphine **AND** naloxone    nitroglycerin    ondansetron    promethazine    [COMPLETED] Insert Peripheral IV **AND** sodium chloride    sodium chloride    sodium chloride      VITAL SIGNS  Vitals:    07/24/25 0745 07/24/25 0750 07/24/25 0755 07/24/25 0800   BP:       BP Location:       Patient Position:       Pulse: 63 60 64 73   Resp:       Temp:       TempSrc:       SpO2: 93% 96% 96% 95%   Weight:       Height:           Flowsheet Rows      Flowsheet Row First Filed Value   Admission Height 157.5 cm (62\") Documented at 07/23/2025 1650   Admission Weight 67.1 kg " (147 lb 14.9 oz) Documented at 07/23/2025 1650             TELEMETRY: atrial paced    Physical Exam  VITALS REVIEWED    General:      well developed, in no acute distress.    Head:      normocephalic and atraumatic.    Eyes:      PERRL/EOM intact, conjunctiva and sclera clear with out nystagmus.    Neck:      no masses, thyromegaly,  trachea central with normal respiratory effort and PMI displaced laterally  Lungs:      Clear to auscultation bilaterally  Heart:       Regular rhythm and rate  Msk:      no deformity or scoliosis noted of thoracic or lumbar spine.    Pulses:      pulses normal in all 4 extremities.    Extremities:       No lower extremity edema    Neurologic:      no focal deficits.   alert oriented x3  Skin:      intact without lesions or rashes.    Psych:      alert and cooperative; normal mood and affect; normal attention span and concentration.        LAB RESULTS (LAST 7 DAYS)    CMP   Results from last 7 days   Lab Units 07/24/25  0353 07/23/25  1724   SODIUM mmol/L 138 133*   POTASSIUM mmol/L 4.0 3.4*   CHLORIDE mmol/L 104 99   CO2 mmol/L 23.8 23.2   BUN mg/dL 13.9 14.8   CREATININE mg/dL 0.86 0.91   GLUCOSE mg/dL 191* 299*   ALBUMIN g/dL  --  3.9   BILIRUBIN mg/dL  --  0.5   ALK PHOS U/L  --  88   AST (SGOT) U/L  --  19   ALT (SGPT) U/L  --  16       BMP  Results from last 7 days   Lab Units 07/24/25  0353 07/23/25  1724   SODIUM mmol/L 138 133*   POTASSIUM mmol/L 4.0 3.4*   CHLORIDE mmol/L 104 99   CO2 mmol/L 23.8 23.2   BUN mg/dL 13.9 14.8   CREATININE mg/dL 0.86 0.91   GLUCOSE mg/dL 191* 299*       CBC  Results from last 7 days   Lab Units 07/24/25  0353 07/23/25  1724   WBC 10*3/mm3 5.30 7.55   RBC 10*6/mm3 3.67* 4.01   HEMOGLOBIN g/dL 10.0* 10.8*   HEMATOCRIT % 30.9* 33.3*   MCV fL 84.2 83.0   PLATELETS 10*3/mm3 199 232       ProBNP        TROPONIN  Results from last 7 days   Lab Units 07/24/25  0353   HSTROP T ng/L 18*       Creatinine Clearance  Estimated Creatinine Clearance: 57 mL/min  (by C-G formula based on SCr of 0.86 mg/dL).      Radiology  XR Chest 1 View  Result Date: 7/23/2025  Impression: No acute cardiopulmonary disease Electronically Signed: Jared Sharma MD  7/23/2025 7:01 PM EDT  Workstation ID: [id number]      EKG    I personally viewed and interpreted the patient's EKG/Telemetry data:  Telemetry Scan   Final Result      ECG 12 Lead Chest Pain   Preliminary Result   HEART RATE=60  bpm   RR Dqacbpog=1716  ms   HI Fbvuyceq=624  ms   P Horizontal Axis=-60  deg   P Front Axis=  deg   QRSD Interval=83  ms   QT Zswfcbmr=905  ms   AYcH=842  ms   QRS Axis=-30  deg   T Wave Axis=7  deg   - ABNORMAL ECG -   Atrial-paced rhythm   Inferior infarct, old   Anterior Q waves, possibly due to LVH   Date and Time of Study:2025-07-24 06:44:11      Telemetry Scan   Final Result      Telemetry Scan   Final Result      ECG 12 Lead Chest Pain   Final Result   HEART RATE=67  bpm   RR Tbfrhlts=282  ms   HI Mvcrxkmk=171  ms   P Horizontal Axis=-12  deg   P Front Axis=50  deg   QRSD Interval=89  ms   QT Mokfwvik=921  ms   ADeN=534  ms   QRS Axis=-35  deg   T Wave Axis=27  deg   - ABNORMAL ECG -   Sinus rhythm   Inferior  infarct, old   When compared with ECG of 25-Feb-2022 20:01:59,   No significant change   Electronically Signed By: Pierce Beard ([facility]) 2025-07-24 06:24:14   Date and Time of Study:2025-07-23 19:07:45      Telemetry Scan   Final Result      Telemetry Scan   Final Result          ECHOCARDIOGRAM:  Results for orders placed during the hospital encounter of 02/21/22    Adult Transthoracic Echo Complete W/ Cont if Necessary Per Protocol 02/23/2022 1105    Interpretation Summary  · Estimated left ventricular EF was in agreement with the calculated left ventricular EF. Left ventricular ejection fraction appears to be 61 - 65%. Left ventricular systolic function is normal.  · Left ventricular wall thickness is consistent with borderline concentric hypertrophy.  · Left ventricular diastolic function  is consistent with (grade I) impaired relaxation.  · Saline test results are negative.  · Estimated right ventricular systolic pressure from tricuspid regurgitation is mildly elevated (35-45 mmHg).      STRESS MYOVIEW:  Results for orders placed during the hospital encounter of 10/04/22    Stress Test With Myocardial Perfusion One Day 10/09/2022 1630    Interpretation Summary  · Findings consistent with a normal ECG stress test.  · Left ventricular ejection fraction is normal. (Calculated EF = 64%).  · Myocardial perfusion imaging indicates a normal myocardial perfusion study with no evidence of ischemia.  · Impressions are consistent with a low risk study.      CARDIAC CATHETERIZATION:  Results for orders placed during the hospital encounter of 25    Cardiac Catheterization/Vascular Study    Conclusion  CARDIAC CATHETERIZATION PCI REPORT      NAME:              Shantel Maharaj  :                1960  AGE/SEX:        65 y.o. female  MRN:                [id number]    Yajaira 3, 2025     : Camron Baires.    DATE OF PROCEDURE: 6/3/2025    INDICATION FOR PROCEDURE: Unstable angina, hypertension, dyslipidemia, diabetes    PROCEDURE PERFORMED:  1.  Left heart cath, coronary arteriogram and left ventriculogram  2.  Drug-eluting stent to subtotally occluded mid LCx  3.  Drug-eluting stent to diagonal branch of LAD      Procedure Description :    The patient underwent successful [x]  Left  []  Right  []  Left & Right  Heart catheterization and coronary angiography via the  [x]  Femoral approach  []  Radial approach  []  Brachial approach    Procedure Narrative:    After informed consent, under conscious sedation,  under conscious sedation, given and monitored by me,moderate conscious sedation was given utilizing IV Versed and fentanyl administered by RN with continuous EKG oximetry and hemodynamic monitoring supervised by me throughout the entire case..  I was present with the patient for the duration  of moderate sedation and supervised staff who had no other duties and monitored the patient for the entire procedure patient had Peters 2-3 sedation scale .  And local anesthesia a 6 Mexican sheath was placed in right femoral artery.  6 Mexican [id number] [id number] pigtail catheter used to perform the cardiac cath.  This revealed that patient had high-grade lesion 99% in mid circumflex.  And diagonal which looked bigger than the LAD had a 90% narrowing.  LAD itself had a long segment of moderate stenosis.  Patient was explained risk benefits alternatives of PCI versus CABG evaluation.  Patient did not want CABG.  Therefore after giving intra-arterial heparin and nitro a run-through wire was used to cross into the diagonal through XB 3 5 guide.  It was predilated then stented with 3.25 x 18 Xience ANIRUDH with reduction of lesion to 0 increase SOHEILA-3 flow.  Then the wire was advanced across the subtotal occluded mid LCx.  He was predilated then stented with 2.5 x 23 Xience ANIRUDH with reduction of lesion to 0 with this SOHEILA-3 flow .  Patient tolerated procedure well.  Complications none.      FINDINGS:    Hemodynamic :  1. Left ventricular pressure: 168/8  LVEDP:8  2. Aortic pressure: 166/70    AV Gradient:  no    LEFT VENTRICULOGRAPHY:    Wall Motion: Anterolateral hypokinesis  Mitral regurgitation: none  Estimated EF : 55%    Coronary Arteriography: (Please Code highest degree of stenosis)  Dominance:  []  Left  [x]  Right  []  Co-Dominant    Left Main %:   Without disease    Proximal LAD %: Had a long calcified 40 to 50% narrowing  Mid/Distal LAD %: Diagonal branch looked bigger than LAD had 90% mid narrowing which was subjected to drug-eluting stent with 3.25 x 18 Xience ANIRUDH with reduction of lesion to 0 with brisk SOHEILA-3 flow.    LCX %:  30% proximal narrowing.  After first marginal circumflex continues as principal marginal and proximally there was 99% stenosis in the mid circumflex and proximal margin.  This was subjected to  drug-eluting stent with 2.5 x 23 Xience ANIRUDH with reduction of lesion to 0 with brisk SOHEILA-3 flow      RCA %: Dominant vessel with 30% mid luminal irregularity      CORONARY INTERVENTION FINDINGS:  PCI Procedure Notes    Segment # mid LCx  Culprit Lesion:  [x]  Yes  []  No  % Pre-Stenosis: 99%  Pre-Proc TIMIFlow: 3  % Post-Stenosis: 0  Post-Proc TIMIFlow: 3  Non-High/Non-C:                          High/C:yes  Lesion Length (mm):  Primary Device Used: 2.5 x 23 Xience ANIRUDH    Segment # first diagonal branch of LAD  Culprit Lesion:  [x]  Yes  []  No  % Pre-Stenosis: 90%  Pre-Proc TIMIFlow: 3  % Post-Stenosis: 0  Post-Proc TIMIFlow: 3  Non-High/Non-C:                          High/C:ya  Lesion Length (mm):  Primary Device Used: 3.25 x 18 Xience ANIRUDH      Conscious Sedation:   [x]  Yes   []  No  No Flow Phenom:       []  Yes  [x]  No  Dissection:  []  Yes  [x]  No  Acute Closure: []  Yes  [x]  No  Perforation:  []  Yes  [x]  No    Complications:  none  Estimated Blood Loss:  5cc.      Impression :    Successful drug-eluting stent to mid LCx and diagonal branch of LAD    Recommendation:    Patient has long moderate segment of disease in proximal and mid LAD.  Will treat aggressively with high-dose statins and dual antiplatelet therapy as tolerated  Continue beta-blockers with Bystolic, ACE inhibitor's with lisinopril and calcium channel blockers with amlodipine as tolerated  Diabetes control  Outpatient cardiac rehab    Pre-Procedure Notes  H&P Performed  [x]  Yes []  No       []  N/A    Indications:  []  ACS <= 24 HRS  []  ACS >24 HRS  []  New Onset Angina <= 2 mos  []  Worsening Angina  []  Resuscitated Cardiac Arrest  []  Angina on Exertion:  []  Suspected CAD  []  Valvular Disease  []  Pericardial Disease  []  Cardiac Arrythmia  []  Cardiomyopathy  []  LV Dysfunction  []  Syncope  []  Post Cardiac Transplant  []  Eval. For Exercise Clearance  []  Other  []  Pre-Operative Evaluation  If Pre-Op Eval:  Evaluation for  Surgery Type:  []  Cardiac Surgery   []  Non-Cardiac Surgery  Functional Capacity:  []  <4 METS  []  >=4 METS w/o symptoms  []  >= 4 METS with symptoms  []  Unknown  Surgical Risk:  []  Low  []  Intermediate  []  High Risk: Vascular  []  High Risk Non-Vascular    Risks, Benefits, & Complications Discussed:  [x]  Yes  []  No  []  N/A    Questions Answered:  [x]  Yes  []  No  []  N/A    Consent Obtained:  [x]  Yes  []  No  []  N/A    CHF: []  Yes  [x]  No  If Yes:  Newly Diagnosed?  []  Yes  []  No  If Yes:  HF Type:  []  Diastolic  []  Systolic  []  Unknown  Electronically signed by Camron Baires MD, 06/03/25, 4:53 PM EDT.      OTHER:         Assessment & Plan       Chest pain  Hyperlipidemia  Thyroid disease  CAD s/p stent    PLAN  Chest pain  - troponin flat 17x 2, 18  - proBNP negative  -CXR negative for acute process  -EKG sinus rhythm paced  -[employer]stone dual-chamber pacemaker  -recent stent on 6/3/2025  -appears pleuritic  -would evaluate for non-cardiac etiology    Hyperlipidemia  -continue statin, fenofibrate    CAD   -s/p stent on 6/3/2025 to LAD and Lcx  -continue DAPT, statin, ACEi, and BB      I discussed the patients findings and my recommendations with patient and nurse.  Further recommendations per Dr. Dinero.    KERI Mesa  07/24/25  09:27 EDT  Electronically signed by KERI Mesa, 07/24/25, 9:47 AM EDT.

## 2025-07-24 NOTE — THERAPY EVALUATION
Patient Name: Shantel Maharaj  : 1960    MRN: [id number]                              Today's Date: 2025       Admit Date: 2025    Visit Dx:     ICD-10-CM ICD-9-CM   1. Other fatigue  R53.83 780.79   2. Chest heaviness  R07.89 786.59   3. Dyspnea on exertion  R06.09 786.09     Patient Active Problem List   Diagnosis    Diabetes    Hypertension    HLD (hyperlipidemia)    Low back pain    Osteoarthritis    Breast cancer    Osteopenia    Sick sinus syndrome    Orthostatic hypotension    Pacemaker    Osteoporosis    Invasive ductal carcinoma of right breast    Hypothyroidism    Angina at rest    Unstable angina    Coronary artery disease involving native coronary artery of native heart with unstable angina pectoris    S/P drug eluting coronary stent placement    Chest pain     Past Medical History:   Diagnosis Date    Arm pain     Breast cancer     Cancer     lt breast -- reocc rt breast 2020    Chronic pain disorder     Colorectal polyp detected on colonoscopy 10/06/2023    COVID-19     Diabetes     Drug therapy     High cholesterol     Hx of radiation therapy     Hypertension     Joint pain     Low back pain     Neuropathy in diabetes     Osteoarthritis     Osteopenia     Thyroid disease      Past Surgical History:   Procedure Laterality Date    APPENDECTOMY      BREAST LUMPECTOMY      2021-- [facility]-- cancer-- reocc    BREAST SURGERY      removal of left breast     CARDIAC CATHETERIZATION N/A 6/3/2025    Procedure: Left Heart Cath;  Surgeon: Camron Baires MD;  Location: [facility] CATH INVASIVE LOCATION;  Service: Cardiovascular;  Laterality: N/A;    CARDIAC CATHETERIZATION N/A 6/3/2025    Procedure: Percutaneous Coronary Intervention;  Surgeon: Camron Baires MD;  Location: [facility] CATH INVASIVE LOCATION;  Service: Cardiovascular;  Laterality: N/A;  Diagonal/ Cx    CARDIAC CATHETERIZATION N/A 6/3/2025    Procedure: Stent ANIRUDH coronary;  Surgeon: Camron Baires  MD;  Location:  [facility] CATH INVASIVE LOCATION;  Service: Cardiovascular;  Laterality: N/A;  Diag/ Cx    CARDIAC ELECTROPHYSIOLOGY PROCEDURE N/A 2022    Procedure: Pacemaker DC new-Kartik;  Surgeon: Ramsey Dinero MD;  Location: [facility] CATH INVASIVE LOCATION;  Service: Cardiology;  Laterality: N/A;     SECTION      x2    GALLBLADDER SURGERY      INSERT / REPLACE / REMOVE PACEMAKER      KNEE SURGERY      left knee - petella broken     OTHER SURGICAL HISTORY      rt arm surgery with shlomo  placement rt arm    SHOULDER SURGERY      broken-lt- in 12 places    WRIST SURGERY      rt - broken      General Information       Row Name 25 1318          Physical Therapy Time and Intention    Document Type evaluation  -JV     Mode of Treatment physical therapy  -JV       Row Name 25 1318          General Information    Patient Profile Reviewed yes  -JV     Prior Level of Function independent:;all household mobility;community mobility;ADL's  -JV     Existing Precautions/Restrictions fall;orthostatic hypotension  -JV     Barriers to Rehab medically complex;previous functional deficit  -JV       Row Name 25 1318          Living Environment    Current Living Arrangements home  -JV     People in Home grandchild(rey)  -JV       Row Name 25 1318          Home Main Entrance    Number of Stairs, Main Entrance three  -JV     Stair Railings, Main Entrance railing on left side (ascending)  -JV       Row Name 25 1318          Stairs Within Home, Primary    Number of Stairs, Within Home, Primary none  -JV       Row Name 25 1318          Cognition    Orientation Status (Cognition) oriented x 4  -JV       Row Name 25 1318          Safety Issues/Impairments Affecting Functional Mobility    Impairments Affecting Function (Mobility) balance;endurance/activity tolerance;pain;shortness of breath;strength  -JV               User Key  (r) = Recorded By, (t) = Taken By, (c) = Cosigned By       Initials Name Provider Type    Ilsa Ambrose Physical Therapist                   Mobility       Row Name 07/24/25 1322          Bed Mobility    Bed Mobility supine-sit  -JV     Supine-Sit Caroline (Bed Mobility) standby assist  -JV       Row Name 07/24/25 1322          Bed-Chair Transfer    Bed-Chair Caroline (Transfers) standby assist;verbal cues  -JV       Row Name 07/24/25 1322          Sit-Stand Transfer    Sit-Stand Caroline (Transfers) standby assist;verbal cues  -JV       Row Name 07/24/25 1322          Gait/Stairs (Locomotion)    Caroline Level (Gait) contact guard;verbal cues  -JV     Distance in Feet (Gait) 40  -JV     Deviations/Abnormal Patterns (Gait) dontae decreased;base of support, narrow;gait speed decreased  -JV     Bilateral Gait Deviations forward flexed posture;decreased arm swing  -JV     Comment, (Gait/Stairs) hnad held assist.  -JV               User Key  (r) = Recorded By, (t) = Taken By, (c) = Cosigned By      Initials Name Provider Type    APPLEIlsa Mckeon Physical Therapist                   Obj/Interventions       Row Name 07/24/25 1323          Range of Motion Comprehensive    General Range of Motion bilateral lower extremity ROM WFL  -JV       [facility] Name 07/24/25 1323          Strength Comprehensive (MMT)    Comment, General Manual Muscle Testing (MMT) Assessment BLE grossly 4/5  -JV       Row Name 07/24/25 1323          Sensory Assessment (Somatosensory)    Sensory Assessment (Somatosensory) LE sensation intact  -JV               User Key  (r) = Recorded By, (t) = Taken By, (c) = Cosigned By      Initials Name Provider Type    Ilsa Ambrose Physical Therapist                   Goals/Plan       Row Name 07/24/25 1325          Bed Mobility Goal 1 (PT)    Activity/Assistive Device (Bed Mobility Goal 1, PT) bed mobility activities, all  -JV     Caroline Level/Cues Needed (Bed Mobility Goal 1, PT) independent  -JV     Time Frame (Bed Mobility Goal  1, PT) long term goal (LTG);2 weeks  -JV       Row Name 07/24/25 1325          Transfer Goal 1 (PT)    Activity/Assistive Device (Transfer Goal 1, PT) sit-to-stand/stand-to-sit;bed-to-chair/chair-to-bed;walker, rolling  -JV     Hart Level/Cues Needed (Transfer Goal 1, PT) modified independence  -JV     Time Frame (Transfer Goal 1, PT) long term goal (LTG);2 weeks  -JV       Row Name 07/24/25 1325          Gait Training Goal 1 (PT)    Activity/Assistive Device (Gait Training Goal 1, PT) gait (walking locomotion);assistive device use  -JV     Hart Level (Gait Training Goal 1, PT) modified independence  -JV     Distance (Gait Training Goal 1, PT) 100 ft  -JV     Time Frame (Gait Training Goal 1, PT) long term goal (LTG);2 weeks  -JV       Row Name 07/24/25 1325          Therapy Assessment/Plan (PT)    Planned Therapy Interventions (PT) balance training;bed mobility training;gait training;home exercise program;strengthening;patient/family education;transfer training  -JV               User Key  (r) = Recorded By, (t) = Taken By, (c) = Cosigned By      Initials Name Provider Type    Ilsa Ambrose Physical Therapist                   Clinical Impression       Row Name 07/24/25 1323          Pain    Pretreatment Pain Rating 5/10  -JV     Posttreatment Pain Rating 5/10  -JV     Pain Location chest  -JV     Pain Side/Orientation right  -JV       Row Name 07/24/25 1323          Plan of Care Review    Outcome Evaluation Pt is a 64 y/o female presenting to [facility] on 7/23/23 with fatigue, chest heaviness, weakness over the past week. Pt with history of recent stent placement and angina. She does report some dizziness when her she stands up, she reports that her blood pressure previously been dropping when she was standing.  Chest XR: 7/23/25: No acute cardiopulmonary disease.  Pt A&Ox4. Pt reports PLOF of living with two teenagers (she is legal guardian) who can assist as needed, in [facility] with 3 LIBBY home with  single rail. Pt reports she is typically (I) with household/community mobility/ambulation, including driving. Pt does own Rwx. Pt denies recent falls.   ORTHOSTATIC VITALS:   SUPINE: /74 mmHg, pulse 60 bpm.   SITTING: /87 mmHg, pulse 62 bpm.   STANDING: /86 mmHg, pulse 65 bpm.  STANDING post gait: /88 mmHg, pulse 63 bpm.   Pt completes bed mobility and transfers with SBA, ambulates x40 ft with HHA and CGA. Pt is facilitated by good social support with 24/7 sup/assist available as needed. At time of d/c from [facility], recommend HHPT.  -JV       Row Name 07/24/25 1323          Therapy Assessment/Plan (PT)    Criteria for Skilled Interventions Met (PT) yes;meets criteria;skilled treatment is necessary  -JV     Therapy Frequency (PT) 3 times/wk  -JV     Predicted Duration of Therapy Intervention (PT) until d/c  -JV       Row Name 07/24/25 1323          Positioning and Restraints    Pre-Treatment Position in bed  -JV     Post Treatment Position chair  -JV     In Chair notified nsg;sitting;call light within reach;encouraged to call for assist;exit alarm on;with family/caregiver  -JV               User Key  (r) = Recorded By, (t) = Taken By, (c) = Cosigned By      Initials Name Provider Type    Ilsa Ambrose Physical Therapist                   Outcome Measures       Row Name 07/24/25 0800          How much help from another person do you currently need...    Turning from your back to your side while in flat bed without using bedrails? 4  -MR (r) JW (t) MR (c)     Moving from lying on back to sitting on the side of a flat bed without bedrails? 3  -MR (r) JW (t) MR (c)     Moving to and from a bed to a chair (including a wheelchair)? 3  -MR (r) JW (t) MR (c)     Standing up from a chair using your arms (e.g., wheelchair, bedside chair)? 2  -MR (r) JW (t) MR (c)     Climbing 3-5 steps with a railing? 2  -MR (r) JW (t) MR (c)     To walk in hospital room? 2  -MR (r) JW (t) MR (c)     AM-PAC 6 Clicks  Score (PT) 16  -JW               User Key  (r) = Recorded By, (t) = Taken By, (c) = Cosigned By      Initials Name Provider Type    Debby Le, RN Registered Nurse    MR Kirk, Mirela Brody, RN Registered Nurse                                 Physical Therapy Education       Title: PT OT SLP Therapies (Resolved)       Topic: Physical Therapy (Resolved)       Point: Mobility training (Resolved)       Learner Progress:  Not documented in this visit.              Point: Home exercise program (Resolved)       Learner Progress:  Not documented in this visit.              Point: Body mechanics (Resolved)       Learner Progress:  Not documented in this visit.              Point: Precautions (Resolved)       Learner Progress:  Not documented in this visit.                                  PT Recommendation and Plan  Planned Therapy Interventions (PT): balance training, bed mobility training, gait training, home exercise program, strengthening, patient/family education, transfer training  Outcome Evaluation: Pt is a 64 y/o female presenting to [facility] on 7/23/23 with fatigue, chest heaviness, weakness over the past week. Pt with history of recent stent placement and angina. She does report some dizziness when her she stands up, she reports that her blood pressure previously been dropping when she was standing.  Chest XR: 7/23/25: No acute cardiopulmonary disease.  Pt A&Ox4. Pt reports PLOF of living with two teenagers (she is legal guardian) who can assist as needed, in [facility] with 3 LIBBY home with single rail. Pt reports she is typically (I) with household/community mobility/ambulation, including driving. Pt does own Rwx. Pt denies recent falls.   ORTHOSTATIC VITALS:   SUPINE: /74 mmHg, pulse 60 bpm.   SITTING: /87 mmHg, pulse 62 bpm.   STANDING: /86 mmHg, pulse 65 bpm.  STANDING post gait: /88 mmHg, pulse 63 bpm.   Pt completes bed mobility and transfers with SBA, ambulates x40 ft with HHA and CGA.  Pt is facilitated by good social support with 24/7 sup/assist available as needed. At time of d/c from [facility], recommend HHPT.     Time Calculation:         PT Charges       Row Name 07/24/25 1326             Time Calculation    Start Time 0949  -JV      Stop Time 1017  -JV      Time Calculation (min) 28 min  -JV      PT Received On 07/24/25  -JV      PT - Next Appointment 07/25/25  -JV      PT Goal Re-Cert Due Date 08/07/25  -JV         Time Calculation- PT    Total Timed Code Minutes- PT 0 minute(s)  -JV                User Key  (r) = Recorded By, (t) = Taken By, (c) = Cosigned By      Initials Name Provider Type    Ilsa Ambrose Physical Therapist                  Therapy Charges for Today       Code Description Service Date Service Provider Modifiers Qty    [id number] HC PT EVAL LOW COMPLEXITY 4 7/24/2025 Ilsa Ahmadi GP 1            PT G-Codes  AM-PAC 6 Clicks Score (PT): 16  PT Discharge Summary  Anticipated Discharge Disposition (PT): home with 24/7 care, home with home health    Ilsa Ahmadi  7/24/2025

## 2025-07-24 NOTE — DISCHARGE SUMMARY
[facility]    Gris Dominguez MD    CHIEF COMPLAINT:     Chest pain    HISTORY OF PRESENT ILLNESS:    [facility]    ED   65-year-old female with a history of recent stent placement, angina, presents to the ED for evaluation of fatigue, chest heaviness, weakness over the past week.  Patient reports she has been increasing tired, she is more fatigued, she has chest heaviness whenever she is up and moving.  Improves when she rests.  Denies any episodes of syncope.  No nausea vomiting.  No fevers.  She does report some dizziness when her she stands up, she reports that her blood pressure previously been dropping when she was standing.       Past Medical History:   Diagnosis Date    Arm pain     Breast cancer     Cancer     lt breast 2012-- reocc rt breast 12/2020    Chronic pain disorder     Colorectal polyp detected on colonoscopy 10/06/2023    COVID-19     Diabetes     Drug therapy     High cholesterol     Hx of radiation therapy     Hypertension     Joint pain     Low back pain     Neuropathy in diabetes     Osteoarthritis     Osteopenia     Thyroid disease      Past Surgical History:   Procedure Laterality Date    APPENDECTOMY      BREAST LUMPECTOMY      1/2021-- [facility]-- cancer-- reocc    BREAST SURGERY      removal of left breast 2012    CARDIAC CATHETERIZATION N/A 6/3/2025    Procedure: Left Heart Cath;  Surgeon: Camron Baires MD;  Location: [facility] CATH INVASIVE LOCATION;  Service: Cardiovascular;  Laterality: N/A;    CARDIAC CATHETERIZATION N/A 6/3/2025    Procedure: Percutaneous Coronary Intervention;  Surgeon: Camron Baires MD;  Location: [facility] CATH INVASIVE LOCATION;  Service: Cardiovascular;  Laterality: N/A;  Diagonal/ Cx    CARDIAC CATHETERIZATION N/A 6/3/2025    Procedure: Stent ANIRUDH coronary;  Surgeon: Camron Baires MD;  Location: [facility] CATH INVASIVE LOCATION;  Service: Cardiovascular;  Laterality: N/A;  Diag/ Cx    CARDIAC ELECTROPHYSIOLOGY PROCEDURE N/A  2022    Procedure: Pacemaker DC new-[place];  Surgeon: Ramsey Dinero MD;  Location: [facility] CATH INVASIVE LOCATION;  Service: Cardiology;  Laterality: N/A;     SECTION      x2    GALLBLADDER SURGERY      INSERT / REPLACE / REMOVE PACEMAKER      KNEE SURGERY      left knee - petella broken     OTHER SURGICAL HISTORY      rt arm surgery with shlomo  placement rt arm    SHOULDER SURGERY      broken-lt- in 12 places    WRIST SURGERY      rt - broken     Family History   Problem Relation Age of Onset    Cancer Mother     Breast cancer Mother     Heart disease Father         Matthias    Hypertension Father     Breast cancer Maternal Aunt     Breast cancer Maternal Aunt     Breast cancer Maternal Aunt      Social History     Tobacco Use    Smoking status: Former     Current packs/day: 1.00     Types: Cigarettes     Passive exposure: Past    Smokeless tobacco: Never    Tobacco comments:     quit  smoking in    Vaping Use    Vaping status: Never Used   Substance Use Topics    Alcohol use: Never    Drug use: Never     Medications Prior to Admission   Medication Sig Dispense Refill Last Dose/Taking    anastrozole (ARIMIDEX) 1 MG tablet Take 1 tablet by mouth Every Night.   2025 Evening    aspirin 81 MG EC tablet Take 1 tablet by mouth Daily. 90 tablet 3 2025 Morning    atorvastatin (LIPITOR) 40 MG tablet Take 1 tablet by mouth every night at bedtime. 90 tablet 3 2025 Bedtime    atorvastatin (LIPITOR) 40 MG tablet Take 1 tablet by mouth Daily.   Taking    clopidogrel (PLAVIX) 75 MG tablet Take 1 tablet by mouth Daily. 90 tablet 3 2025 Morning    Continuous Glucose  (Dexcom G7 ) device    Taking    Continuous Glucose Sensor (FreeStyle Tali 3 Sensor) misc CHANGE SENSOR EVERY 14 DAYS   2025    cyanocobalamin 1000 MCG/ML injection INJECT 1ML ONCE MONTHLY   Taking    cyclobenzaprine (FLEXERIL) 10 MG tablet Take 1 tablet by mouth 3 (Three) Times a Day As Needed for Muscle  Spasms. 90 tablet 2 Taking As Needed    dexlansoprazole (DEXILANT) 60 MG capsule Take 1 capsule by mouth Daily As Needed.   Taking As Needed    fenofibrate (TRICOR) 145 MG tablet Take 1 tablet by mouth As Needed.   Taking As Needed    ferrous sulfate 325 (65 FE) MG tablet Take 1 tablet by mouth As Needed.   Taking As Needed    fluticasone (FLONASE) 50 MCG/ACT nasal spray 1 spray by Each Nare route As Needed. As needed   Taking As Needed    HYDROcodone-acetaminophen (NORCO) 7.5-325 MG per tablet Take 1 tablet by mouth Every 8 (Eight) Hours As Needed for Moderate Pain. 90 tablet 0 Taking As Needed    Ibuprofen 3 %, Gabapentin 10 %, Baclofen 2 %, lidocaine 4 %, Ketamine HCl 4 % Apply 1-2 g topically to the appropriate area as directed 3 (Three) to 4 (Four) times daily. 90 g 5 7/22/2025 Evening    Insulin Lispro Prot & Lispro (humaLOG 75-25) (75-25) 100 UNIT/ML suspension pen-injector pen Inject 30 Units under the skin into the appropriate area as directed 2 (Two) Times a Day With Meals.   7/23/2025 Morning    levothyroxine (SYNTHROID, LEVOTHROID) 150 MCG tablet Take 1 tablet by mouth Every Morning.   7/23/2025 Morning    levothyroxine (SYNTHROID, LEVOTHROID) 150 MCG tablet Take 1 tablet by mouth Daily.   Taking    lisinopril (PRINIVIL,ZESTRIL) 5 MG tablet Take 1 tablet by mouth Every Night.   7/22/2025 Evening    nebivolol (BYSTOLIC) 10 MG tablet Take 1 tablet by mouth Every Night.   7/22/2025 Evening    nitroglycerin (NITROSTAT) 0.4 MG SL tablet 1 under the tongue as needed for angina, may repeat q5mins for up three doses 100 tablet 11 Taking    polyethylene glycol (MIRALAX) 17 GM/SCOOP powder Take 17 g by mouth Daily As Needed (Use if senna-docusate is ineffective). 510 g 0 Taking As Needed    promethazine (PHENERGAN) 25 MG tablet Take 1 tablet by mouth Every 6 (Six) Hours As Needed for Nausea.   Taking As Needed    amLODIPine (NORVASC) 10 MG tablet Take 1 tablet by mouth Daily.       glipizide (GLUCOTROL XL) 5 MG ER  tablet Take 1 tablet by mouth Every Night.       promethazine (PHENERGAN) 25 MG tablet Take 1 tablet by mouth Every 6 (Six) Hours As Needed.       Rybelsus 7 MG tablet Take 7 mg by mouth Daily.        Allergies:  Valsartan    Immunization History   Administered Date(s) Administered    31-influenza Vac Quardvalent Preservativ 11/21/2016    Fluzone  >6mos 09/10/2014    Influenza Seasonal Injectable 09/25/2011, 10/23/2013, 09/17/2014, 09/25/2015, 10/23/2018    Pneumococcal Conjugate 13-Valent (PCV13) 03/19/2024    Pneumococcal Polysaccharide (PPSV23) 05/06/2015           REVIEW OF SYSTEMS:    Review of Systems   Cardiovascular:  Positive for chest pain.   Respiratory:  Negative for shortness of breath.    Gastrointestinal:  Negative for nausea and vomiting.   Neurological:  Positive for weakness.           Vital Signs  Temp:  [97.5 °F (36.4 °C)-98.6 °F (37 °C)] 97.8 °F (36.6 °C)  Heart Rate:  [60-81] 73  Resp:  [13-20] 15  BP: (105-162)/() 120/75          Physical Exam:  Physical Exam  Constitutional:       Appearance: Normal appearance.   Cardiovascular:      Rate and Rhythm: Normal rate and regular rhythm.   Pulmonary:      Effort: Pulmonary effort is normal.      Breath sounds: Normal breath sounds.   Skin:     General: Skin is warm and dry.   Neurological:      General: No focal deficit present.      Mental Status: She is alert and oriented to person, place, and time. Mental status is at baseline.   Psychiatric:         Mood and Affect: Mood normal.         Behavior: Behavior normal.       Emotional Behavior:    wnl   Debilities:   None      Results Review:    I reviewed the patient's new clinical results.  Lab Results (most recent)       Procedure Component Value Units Date/Time    TSH [[id number]]  (Abnormal) Collected: 07/24/25 0353    Specimen: Blood from Arm, Right Updated: 07/24/25 0950     TSH 5.470 uIU/mL     POC Glucose Once [[id number]]  (Abnormal) Collected: 07/24/25 0834    Specimen: Blood Updated:  07/24/25 0836     Glucose 176 mg/dL      Comment: Serial Number: [id number]:  [id number]       High Sensitivity Troponin T [[id number]]  (Abnormal) Collected: 07/24/25 0353    Specimen: Blood from Arm, Right Updated: 07/24/25 0719     HS Troponin T 18 ng/L     Narrative:      High Sensitive Troponin T Reference Range:  <14.0 ng/L- Negative Female for AMI  <22.0 ng/L- Negative Male for AMI  >=14 - Abnormal Female indicating possible myocardial injury.  >=22 - Abnormal Male indicating possible myocardial injury.   Clinicians would have to utilize clinical acumen, EKG, Troponin, and serial changes to determine if it is an Acute Myocardial Infarction or myocardial injury due to an underlying chronic condition.         Basic Metabolic Panel [[id number]]  (Abnormal) Collected: 07/24/25 0353    Specimen: Blood from Arm, Right Updated: 07/24/25 0529     Glucose 191 mg/dL      BUN 13.9 mg/dL      Creatinine 0.86 mg/dL      Sodium 138 mmol/L      Potassium 4.0 mmol/L      Chloride 104 mmol/L      CO2 23.8 mmol/L      Calcium 8.6 mg/dL      BUN/Creatinine Ratio 16.2     Anion Gap 10.2 mmol/L      eGFR 74.2 mL/min/1.73     Narrative:      GFR Categories in Chronic Kidney Disease (CKD)              GFR Category          GFR (mL/min/1.73)    Interpretation  G1                    90 or greater        Normal or high (1)  G2                    60-89                Mild decrease (1)  G3a                   45-59                Mild to moderate decrease  G3b                   30-44                Moderate to severe decrease  G4                    15-29                Severe decrease  G5                    14 or less           Kidney failure    (1)In the absence of evidence of kidney disease, neither GFR category G1 or G2 fulfill the criteria for CKD.    eGFR calculation 2021 CKD-EPI creatinine equation, which does not include race as a factor    CBC Auto Differential [[id number]]  (Abnormal) Collected: 07/24/25 0353    Specimen:  Blood from Arm, Right Updated: 07/24/25 0503     WBC 5.30 10*3/mm3      RBC 3.67 10*6/mm3      Hemoglobin 10.0 g/dL      Hematocrit 30.9 %      MCV 84.2 fL      MCH 27.2 pg      MCHC 32.4 g/dL      RDW 13.2 %      RDW-SD 40.7 fl      MPV 11.4 fL      Platelets 199 10*3/mm3      Neutrophil % 43.1 %      Lymphocyte % 45.8 %      Monocyte % 7.4 %      Eosinophil % 2.8 %      Basophil % 0.9 %      Immature Grans % 0.0 %      Neutrophils, Absolute 2.28 10*3/mm3      Lymphocytes, Absolute 2.43 10*3/mm3      Monocytes, Absolute 0.39 10*3/mm3      Eosinophils, Absolute 0.15 10*3/mm3      Basophils, Absolute 0.05 10*3/mm3      Immature Grans, Absolute 0.00 10*3/mm3      nRBC 0.0 /100 WBC     High Sensitivity Troponin T 1Hr [[id number]]  (Abnormal) Collected: 07/23/25 1843    Specimen: Blood Updated: 07/23/25 1911     HS Troponin T 17 ng/L      Troponin T Numeric Delta 0 ng/L      Troponin T % Delta 0    Narrative:      High Sensitive Troponin T Reference Range:  <14.0 ng/L- Negative Female for AMI  <22.0 ng/L- Negative Male for AMI  >=14 - Abnormal Female indicating possible myocardial injury.  >=22 - Abnormal Male indicating possible myocardial injury.   Clinicians would have to utilize clinical acumen, EKG, Troponin, and serial changes to determine if it is an Acute Myocardial Infarction or myocardial injury due to an underlying chronic condition.         Comprehensive Metabolic Panel [[id number]]  (Abnormal) Collected: 07/23/25 1724    Specimen: Blood Updated: 07/23/25 1751     Glucose 299 mg/dL      BUN 14.8 mg/dL      Creatinine 0.91 mg/dL      Sodium 133 mmol/L      Potassium 3.4 mmol/L      Chloride 99 mmol/L      CO2 23.2 mmol/L      Calcium 8.8 mg/dL      Total Protein 6.7 g/dL      Albumin 3.9 g/dL      ALT (SGPT) 16 U/L      AST (SGOT) 19 U/L      Alkaline Phosphatase 88 U/L      Total Bilirubin 0.5 mg/dL      Globulin 2.8 gm/dL      A/G Ratio 1.4 g/dL      BUN/Creatinine Ratio 16.3     Anion Gap 10.8 mmol/L       eGFR 69.3 mL/min/1.73     Narrative:      GFR Categories in Chronic Kidney Disease (CKD)              GFR Category          GFR (mL/min/1.73)    Interpretation  G1                    90 or greater        Normal or high (1)  G2                    60-89                Mild decrease (1)  G3a                   45-59                Mild to moderate decrease  G3b                   30-44                Moderate to severe decrease  G4                    15-29                Severe decrease  G5                    14 or less           Kidney failure    (1)In the absence of evidence of kidney disease, neither GFR category G1 or G2 fulfill the criteria for CKD.    eGFR calculation 2021 CKD-EPI creatinine equation, which does not include race as a factor    BNP [[id number]]  (Normal) Collected: 07/23/25 1724    Specimen: Blood Updated: 07/23/25 1751     proBNP 441.0 pg/mL     Narrative:      This assay is used as an aid in the diagnosis of individuals suspected of having heart failure. It can be used as an aid in the diagnosis of acute decompensated heart failure (ADHF) in patients presenting with signs and symptoms of ADHF to the emergency department (ED). In addition, NT-proBNP of <300 pg/mL indicates ADHF is not likely.    Age Range Result Interpretation  NT-proBNP Concentration (pg/mL:      <50             Positive            >450                   Gray                 300-450                    Negative             <300    50-75           Positive            >900                  Gray                300-900                  Negative            <300      >75             Positive            >1800                  Gray                300-1800                  Negative            <300    High Sensitivity Troponin T [[id number]]  (Abnormal) Collected: 07/23/25 1724    Specimen: Blood Updated: 07/23/25 1751     HS Troponin T 17 ng/L     Narrative:      High Sensitive Troponin T Reference Range:  <14.0 ng/L- Negative Female for  AMI  <22.0 ng/L- Negative Male for AMI  >=14 - Abnormal Female indicating possible myocardial injury.  >=22 - Abnormal Male indicating possible myocardial injury.   Clinicians would have to utilize clinical acumen, EKG, Troponin, and serial changes to determine if it is an Acute Myocardial Infarction or myocardial injury due to an underlying chronic condition.         CBC & Differential [[id number]]  (Abnormal) Collected: 07/23/25 1724    Specimen: Blood Updated: 07/23/25 1728    Narrative:      The following orders were created for panel order CBC & Differential.  Procedure                               Abnormality         Status                     ---------                               -----------         ------                     CBC Auto Differential[[id number]]        Abnormal            Final result                 Please view results for these tests on the individual orders.    CBC Auto Differential [[id number]]  (Abnormal) Collected: 07/23/25 1724    Specimen: Blood Updated: 07/23/25 1728     WBC 7.55 10*3/mm3      RBC 4.01 10*6/mm3      Hemoglobin 10.8 g/dL      Hematocrit 33.3 %      MCV 83.0 fL      MCH 26.9 pg      MCHC 32.4 g/dL      RDW 13.0 %      RDW-SD 39.8 fl      MPV 10.8 fL      Platelets 232 10*3/mm3      Neutrophil % 55.5 %      Lymphocyte % 34.7 %      Monocyte % 7.5 %      Eosinophil % 1.5 %      Basophil % 0.5 %      Immature Grans % 0.3 %      Neutrophils, Absolute 4.19 10*3/mm3      Lymphocytes, Absolute 2.62 10*3/mm3      Monocytes, Absolute 0.57 10*3/mm3      Eosinophils, Absolute 0.11 10*3/mm3      Basophils, Absolute 0.04 10*3/mm3      Immature Grans, Absolute 0.02 10*3/mm3      nRBC 0.0 /100 WBC             Imaging Results (Most Recent)       Procedure Component Value Units Date/Time    CT Chest Without Contrast Diagnostic [[id number]] Collected: 07/24/25 1043     Updated: 07/24/25 1102    Narrative:      CT CHEST WO CONTRAST DIAGNOSTIC    Date of Exam: 7/24/2025 10:42 AM  EDT    Indication: non cardiac chest pain.    Comparison: Chest radiograph 7/23/2025    Technique: Axial CT images were obtained of the chest without contrast administration.  Sagittal and coronal reconstructions were performed.  Automated exposure control and iterative reconstruction methods were used.      Findings:  The lungs are clear with no significant nodules, masses or infiltrates. There is no significant pleural or pericardial fluid. There are coronary artery calcifications. There is a transvenous pacemaker in place. There is no lymphadenopathy. There is a   small hiatal hernia. Surgical clips are present in both axillary regions. The patient apparently has undergone bilateral mastectomy. There is no axillary lymphadenopathy. The upper abdomen is unremarkable. There are degenerative changes in the spine with   no destructive bone lesions.      Impression:      Impression:  1.No acute pulmonary disease.  2.Coronary artery calcifications. Transvenous pacemaker in place.  3.Small hiatal hernia.  4.Bilateral mastectomy.        Electronically Signed: Silas Castillo MD    7/24/2025 11:00 AM EDT    Workstation ID: [id number]    XR Chest 1 View [[id number]] Collected: 07/23/25 1900     Updated: 07/23/25 1903    Narrative:      XR CHEST 1 VW    Date of Exam: 7/23/2025 6:14 PM EDT    Indication: Chest pain    Comparison: June 2025    Findings:  Dual-lead pacer device is unchanged. Heart size is stable. Aorta is mildly elongated and calcified    Advanced degenerative changes are present left shoulder    Hardware evident right humerus.    Lung parenchyma demonstrates hyperinflation with no acute abnormality appreciated.      Impression:      Impression:  No acute cardiopulmonary disease      Electronically Signed: Jared Sharma MD    7/23/2025 7:01 PM EDT    Workstation ID: [id number]          reviewed    ECG/EMG Results (most recent)       Procedure Component Value Units Date/Time    Telemetry Scan [[id number]] Resulted:  07/23/25 1712     Updated: 07/23/25 1718    Telemetry Scan [[id number]] Resulted: 07/23/25 1859     Updated: 07/23/25 1934    Telemetry Scan [[id number]] Resulted: 07/24/25 0007     Updated: 07/24/25 0020    Telemetry Scan [[id number]] Resulted: 07/24/25 0421     Updated: 07/24/25 0437    ECG 12 Lead Chest Pain [[id number]] Collected: 07/23/25 1907     Updated: 07/24/25 0624     QT Interval 447 ms      QTC Interval 472 ms     Narrative:      HEART RATE=67  bpm  RR Tmsfylcn=103  ms  UT Kqclolot=128  ms  P Horizontal Axis=-12  deg  P Front Axis=50  deg  QRSD Interval=89  ms  QT Dsmmqmhs=125  ms  RPpS=764  ms  QRS Axis=-35  deg  T Wave Axis=27  deg  - ABNORMAL ECG -  Sinus rhythm  Inferior  infarct, old  When compared with ECG of 25-Feb-2022 20:01:59,  No significant change  Electronically Signed By: Pierce Beard ([facility]) 2025-07-24 06:24:14  Date and Time of Study:2025-07-23 19:07:45    ECG 12 Lead Chest Pain [[id number]] Collected: 07/24/25 0644     Updated: 07/24/25 0647     QT Interval 452 ms      QTC Interval 452 ms     Narrative:      HEART RATE=60  bpm  RR Oemindwf=0233  ms  UT Xfeenolg=807  ms  P Horizontal Axis=-60  deg  P Front Axis=  deg  QRSD Interval=83  ms  QT Sseoxfvo=234  ms  LUmL=348  ms  QRS Axis=-30  deg  T Wave Axis=7  deg  - ABNORMAL ECG -  Atrial-paced rhythm  Inferior infarct, old  Anterior Q waves, possibly due to LVH  Date and Time of Study:2025-07-24 06:44:11    Telemetry Scan [[id number]] Resulted: 07/24/25 0703     Updated: 07/24/25 0739          reviewed        Results for orders placed during the hospital encounter of 02/21/22    Adult Transthoracic Echo Complete W/ Cont if Necessary Per Protocol    Interpretation Summary  · Estimated left ventricular EF was in agreement with the calculated left ventricular EF. Left ventricular ejection fraction appears to be 61 - 65%. Left ventricular systolic function is normal.  · Left ventricular wall thickness is consistent with borderline concentric  hypertrophy.  · Left ventricular diastolic function is consistent with (grade I) impaired relaxation.  · Saline test results are negative.  · Estimated right ventricular systolic pressure from tricuspid regurgitation is mildly elevated (35-45 mmHg).      Microbiology Results (last 10 days)       ** No results found for the last 240 hours. **            Assessment & Plan     Chest pain     Chest pain  Lab Results   Component Value Date    TROPONINT 18 (H) 07/24/2025    TROPONINT 17 (H) 07/23/2025    TROPONINT 17 (H) 07/23/2025     - stents placed in June 2025  -cbc, bnp, cmp, unremarkable  -Chest X-ray:reviewed and showing no acute process  -EKG:rate 60 atrial paced  - cardiology consulted and feels it is non cardiac related  -Telemetry    Chest wall pain  - ct chest performed and showed No acute pulmonary disease. Coronary artery calcifications. Transvenous pacemaker in place. Small hiatal hernia. Bilateral mastectomy   - lidocaine patches   - short course of steroids    Diabetes mellitus  -moderately controlled   Lab Results   Component Value Date    GLUCOSE 191 (H) 07/24/2025    GLUCOSE 299 (H) 07/23/2025    GLUCOSE 199 (H) 06/04/2025    GLUCOSE 140 (H) 06/03/2025     - follow up with your endocrine md about changes to your insulin since large weight loss  -Diabetic diet  -Monitor before meals and at bedtime       Hypothyroidism  - tsh 5.4  - cont synthroid  - follow up with pcp    I discussed the patients findings and my recommendations with patient and nursing staff.     Discharge Diagnosis:      Chest pain      Hospital Course  Patient is a 65 y.o. female presented with chest pain. Er evaluated and admitted to observation. Labs including cbc, bnp, bmp are normal. Chest xray is normal. EKG rate 60 paced. Cardiology consulted and has no further recommendations. Ct chest performed and no acute pulmonary disease. Will dc with lidocaine patches and steroids. Pt instructed to follow up with endocrinology to manage  insulin. Discharge discussed with pt and she is agreeable to plan. Instructed pt to return to er if symptoms reoccur or worsen.       Past Medical History:     Past Medical History:   Diagnosis Date    Arm pain     Breast cancer     Cancer     lt breast -- reocc rt breast 2020    Chronic pain disorder     Colorectal polyp detected on colonoscopy 10/06/2023    COVID-19     Diabetes     Drug therapy     High cholesterol     Hx of radiation therapy     Hypertension     Joint pain     Low back pain     Neuropathy in diabetes     Osteoarthritis     Osteopenia     Thyroid disease        Past Surgical History:     Past Surgical History:   Procedure Laterality Date    APPENDECTOMY      BREAST LUMPECTOMY      2021-- [facility]-- cancer-- reocc    BREAST SURGERY      removal of left breast     CARDIAC CATHETERIZATION N/A 6/3/2025    Procedure: Left Heart Cath;  Surgeon: Camron Baires MD;  Location:  [facility] CATH INVASIVE LOCATION;  Service: Cardiovascular;  Laterality: N/A;    CARDIAC CATHETERIZATION N/A 6/3/2025    Procedure: Percutaneous Coronary Intervention;  Surgeon: Camron Baires MD;  Location:  [facility] CATH INVASIVE LOCATION;  Service: Cardiovascular;  Laterality: N/A;  Diagonal/ Cx    CARDIAC CATHETERIZATION N/A 6/3/2025    Procedure: Stent ANIRUDH coronary;  Surgeon: Camron Baires MD;  Location:  [facility] CATH INVASIVE LOCATION;  Service: Cardiovascular;  Laterality: N/A;  Diag/ Cx    CARDIAC ELECTROPHYSIOLOGY PROCEDURE N/A 2022    Procedure: Pacemaker DC new-[place];  Surgeon: Ramsey Dinero MD;  Location:  [facility] CATH INVASIVE LOCATION;  Service: Cardiology;  Laterality: N/A;     SECTION      x2    GALLBLADDER SURGERY      INSERT / REPLACE / REMOVE PACEMAKER      KNEE SURGERY      left knee - petella broken     OTHER SURGICAL HISTORY      rt arm surgery with shlomo  placement rt arm    SHOULDER SURGERY      broken-lt- in 12 places    WRIST SURGERY      rt - broken        Social History:   Social History     Socioeconomic History    Marital status:    Tobacco Use    Smoking status: Former     Current packs/day: 1.00     Types: Cigarettes     Passive exposure: Past    Smokeless tobacco: Never    Tobacco comments:     quit  smoking in 2008   Vaping Use    Vaping status: Never Used   Substance and Sexual Activity    Alcohol use: Never    Drug use: Never    Sexual activity: Not Currently     Birth control/protection: None       Procedures Performed         Consults:   Consults       Date and Time Order Name Status Description    7/23/2025  7:25 PM Inpatient Cardiology Consult              Condition on Discharge:     Stable    Discharge Disposition      Discharge Medications     Discharge Medications        Continue These Medications        Instructions Start Date   Dexcom G7  device       FreeStyle Tali 3 Sensor misc   CHANGE SENSOR EVERY 14 DAYS             ASK your doctor about these medications        Instructions Start Date   amLODIPine 10 MG tablet  Commonly known as: NORVASC   10 mg, Daily      anastrozole 1 MG tablet  Commonly known as: ARIMIDEX   1 mg, Nightly      aspirin 81 MG EC tablet   81 mg, Oral, Daily      atorvastatin 40 MG tablet  Commonly known as: LIPITOR   40 mg, Oral, Every Night at Bedtime      atorvastatin 40 MG tablet  Commonly known as: LIPITOR   40 mg, Daily      clopidogrel 75 MG tablet  Commonly known as: PLAVIX   75 mg, Oral, Daily      cyanocobalamin 1000 MCG/ML injection   INJECT 1ML ONCE MONTHLY      cyclobenzaprine 10 MG tablet  Commonly known as: FLEXERIL   10 mg, Oral, 3 Times Daily PRN      dexlansoprazole 60 MG capsule  Commonly known as: DEXILANT   60 mg, Daily PRN      fenofibrate 145 MG tablet  Commonly known as: TRICOR   145 mg, As Needed      ferrous sulfate 325 (65 FE) MG tablet   325 mg, As Needed      fluticasone 50 MCG/ACT nasal spray  Commonly known as: FLONASE   1 spray, As Needed      glipizide 5 MG ER  tablet  Commonly known as: GLUCOTROL XL   5 mg, Oral, Nightly      HYDROcodone-acetaminophen 7.5-325 MG per tablet  Commonly known as: NORCO   1 tablet, Oral, Every 8 Hours PRN      Ibuprofen 3 %, Gabapentin 10 %, Baclofen 2 %, lidocaine 4 %, Ketamine HCl 4 %   1-2 g, Topical, 3 to 4 Times Daily      Insulin Lispro Prot & Lispro (75-25) 100 UNIT/ML suspension pen-injector pen  Commonly known as: humaLOG 75-25   Inject 30 Units under the skin into the appropriate area as directed 2 (Two) Times a Day With Meals.      levothyroxine 150 MCG tablet  Commonly known as: SYNTHROID, LEVOTHROID   Take 1 tablet by mouth Every Morning.      levothyroxine 150 MCG tablet  Commonly known as: SYNTHROID, LEVOTHROID   150 mcg, Daily      lisinopril 5 MG tablet  Commonly known as: PRINIVIL,ZESTRIL   5 mg, Nightly      nebivolol 10 MG tablet  Commonly known as: BYSTOLIC   1 tablet, Nightly      nitroglycerin 0.4 MG SL tablet  Commonly known as: NITROSTAT   1 under the tongue as needed for angina, may repeat q5mins for up three doses      polyethylene glycol 17 GM/SCOOP powder  Commonly known as: MIRALAX   17 g, Oral, Daily PRN      promethazine 25 MG tablet  Commonly known as: PHENERGAN   25 mg, Every 6 Hours PRN      promethazine 25 MG tablet  Commonly known as: PHENERGAN   25 mg, Oral, Every 6 Hours PRN      Rybelsus 7 MG tablet  Generic drug: Semaglutide   1 tablet, Daily               Discharge Diet:     Activity at Discharge:     Follow-up Appointments  Future Appointments   Date Time Provider Department Center   9/2/2025 10:40 AM Get Dhillon,  [facility]   11/19/2025 11:30 AM [facility]   11/19/2025 11:45 AM Ramsey Dinero MD [facility]         Test Results Pending at Discharge  Pending Results       None             Risk for Readmission (LACE) Score: 4 (7/24/2025  6:00 AM)      Less Than 30 minutes spent in discharge activities for this  patient    Signature:Electronically signed by Delaney Kaye PA-C, 07/24/25, 6:30 PM EDT.

## 2025-07-24 NOTE — CONSULTS
"Diabetes Education  Assessment/Teaching    Patient Name:  Shantel Maharaj  YOB: 1960  MRN: [id number]  Admit Date:  7/23/2025      Assessment Date:  7/24/2025  Flowsheet Row Most Recent Value   General Information     Referral From: MD order  [Consult received due to pt requesting information about menu options to help with bs control. Last A1c in [facility] was from 6/4/2025 and result was 8.1%. Adm bs 299.]   Height 157.5 cm (62.01\")   Weight 67.1 kg (147 lb 14.9 oz)   Pregnancy Assessment    Diabetes History    What type of diabetes do you have? Type 2   Length of Diabetes Diagnosis --  [Pt states had prediabetes for years and was told she had diabetes about 2 years ago.]   Have you had diabetes education/teaching in the past? yes   When and where was your diabetes education? Last seen by inpatient diabetes educator at [facility] on 6/4/2025.   Do you test your blood sugar at home? yes   Frequency of checks wears Dexcom G7 continuous glucose sensor   Meter type has backup meter   Who performs the test? self   Typical readings having problems with low bs and high bs   Have you had low blood sugar? (<70mg/dl) yes   How often do you have low blood sugar? frequently  [2-3 times/week during the night.]   Education Preferences    Nutrition Information    Assessment Topics    DM Goals             Flowsheet Row Most Recent Value   DM Education Needs    Meter Has own   Frequency of Testing --  [Discussed importance of sharing sensor readings with endocrinology office for insulin dosage adjustments.]   Medication Insulin, Oral, Pen  [Pt has been taking Lispro 75/25 45 units bid. He has been holding the Glipizide XR 5 mg tab and the Rybelsus 7 mg daily. She was instructed to hold these by PCP since she has been having low bs.]   Problem Solving Hypoglycemia, Hyperglycemia, Signs, Symptoms, Treatment   Healthy Eating --  [Pt states she does try to eat 3 meals/day. Pt trying to follow lower CHO meal plan.]   Motivation " Engaged   Teaching Method Explanation, Discussion   Patient Response Verbalized understanding              Other Comments:  Educator atttempted visit with pt in room. Pt has been discharged. Educator contacted pt per phone call and assessment/education completed over phone call. Pt follows with Dr. Anibal Swenson, endocrinologist, with last visit being on 7/11/2025. Pt has follow up visit scheduled for 9/5/2025. Pt states has seen her PCP a couple times since her last endocrinology visit and was instructed to hold the Glipizide and Rybelsus since she is having lows during the night. Pt states the low bs occurs at least 2-3 times/night during sleep times. She states she occasionally will have low bs during the day.     Explained to the patient that it is difficult when two different MDs are involved in adjusting the diabetes meds. Educator encouraged pt to contact Dr. Anibal Swenson and have him review her sensor readings. Encouraged pt to also let endocrinogist know that the Gipizide and Rybelsus have been on hold per instructions by PCP.    Discussed with pt that the amount of CHO she is eating at supper may play a part in her hypoglycemia during the night. Pt gave some example supper meals. Pt may have chicken salad one night. A different night, she may have roast and potatoes. Discussed if she is having salad and there is no CHO on salad, she may want to add some CHO to this meal by having crackers or a piece of fruit. Discussed when taking the same dosage of insulin, similar amounts of CHO should be eaten at the meal. Also discussed MD may want to give her instructions for taking lower dosage of insulin at supper if not eating as much CHO for the meal. Pt states she will contact endocrinology office to have MD review the sensor readings and also to advise on her insulin doses and whether she should restart the Glipizide and Rybelsus. Pt states she does have her insulins and sensors/bs monitoring supplies. Pt with no  additional questions for educator at this time.       Electronically signed by:  Linda Chaves RN  07/24/25 13:35 EDT

## 2025-07-24 NOTE — PLAN OF CARE
Goal Outcome Evaluation:              Outcome Evaluation: Pt is a 64 y/o female presenting to [facility] on 7/23/23 with fatigue, chest heaviness, weakness over the past week. Pt with history of recent stent placement and angina. She does report some dizziness when her she stands up, she reports that her blood pressure previously been dropping when she was standing.  Chest XR: 7/23/25: No acute cardiopulmonary disease.  Pt A&Ox4. Pt reports PLOF of living with two teenagers (she is legal guardian) who can assist as needed, in [facility] with 3 [address]BY home with single rail. Pt reports she is typically (I) with household/community mobility/ambulation, including driving. Pt does own Rwx. Pt denies recent falls.   ORTHOSTATIC VITALS:   SUPINE: /74 mmHg, pulse 60 bpm.   SITTING: /87 mmHg, pulse 62 bpm.   STANDING: /86 mmHg, pulse 65 bpm.  STANDING post gait: /88 mmHg, pulse 63 bpm.   Pt completes bed mobility and transfers with SBA, ambulates x40 ft with HHA and CGA. Pt is facilitated by good social support with 24/7 sup/assist available as needed. At time of d/c from [facility], recommend HHPT.    Anticipated Discharge Disposition (PT): home with 24/7 care, home with home health

## 2025-08-04 LAB
MC_CV_MDC_IDC_RATE_1: 160
MC_CV_MDC_IDC_ZONE_ID: 1
MDC_IDC_MSMT_BATTERY_REMAINING_LONGEVITY: 78 MO
MDC_IDC_MSMT_BATTERY_REMAINING_PERCENTAGE: 100 %
MDC_IDC_MSMT_BATTERY_STATUS: NORMAL
MDC_IDC_MSMT_LEADCHNL_RA_DTM: NORMAL
MDC_IDC_MSMT_LEADCHNL_RA_IMPEDANCE_VALUE: 491
MDC_IDC_MSMT_LEADCHNL_RA_PACING_THRESHOLD_AMPLITUDE: 0.6
MDC_IDC_MSMT_LEADCHNL_RA_PACING_THRESHOLD_POLARITY: NORMAL
MDC_IDC_MSMT_LEADCHNL_RA_PACING_THRESHOLD_PULSEWIDTH: 0.4
MDC_IDC_MSMT_LEADCHNL_RA_SENSING_INTR_AMPL: 4.9
MDC_IDC_MSMT_LEADCHNL_RV_DTM: NORMAL
MDC_IDC_MSMT_LEADCHNL_RV_IMPEDANCE_VALUE: 616
MDC_IDC_MSMT_LEADCHNL_RV_PACING_THRESHOLD_AMPLITUDE: 0.9
MDC_IDC_MSMT_LEADCHNL_RV_PACING_THRESHOLD_POLARITY: NORMAL
MDC_IDC_MSMT_LEADCHNL_RV_PACING_THRESHOLD_PULSEWIDTH: 0.4
MDC_IDC_MSMT_LEADCHNL_RV_SENSING_INTR_AMPL: 3.3
MDC_IDC_PG_IMPLANT_DTM: NORMAL
MDC_IDC_PG_MFG: NORMAL
MDC_IDC_PG_MODEL: NORMAL
MDC_IDC_PG_SERIAL: NORMAL
MDC_IDC_PG_TYPE: NORMAL
MDC_IDC_SESS_DTM: NORMAL
MDC_IDC_SESS_TYPE: NORMAL
MDC_IDC_SET_BRADY_AT_MODE_SWITCH_RATE: 170
MDC_IDC_SET_BRADY_LOWRATE: 60
MDC_IDC_SET_BRADY_MAX_SENSOR_RATE: 120
MDC_IDC_SET_BRADY_MAX_TRACKING_RATE: 120
MDC_IDC_SET_BRADY_MODE: NORMAL
MDC_IDC_SET_BRADY_PAV_DELAY: 250
MDC_IDC_SET_BRADY_SAV_DELAY: 250
MDC_IDC_SET_LEADCHNL_RA_PACING_AMPLITUDE: 2.2
MDC_IDC_SET_LEADCHNL_RA_PACING_POLARITY: NORMAL
MDC_IDC_SET_LEADCHNL_RA_PACING_PULSEWIDTH: 0.4
MDC_IDC_SET_LEADCHNL_RA_SENSING_POLARITY: NORMAL
MDC_IDC_SET_LEADCHNL_RA_SENSING_SENSITIVITY: 0.25
MDC_IDC_SET_LEADCHNL_RV_PACING_AMPLITUDE: 2.2
MDC_IDC_SET_LEADCHNL_RV_PACING_POLARITY: NORMAL
MDC_IDC_SET_LEADCHNL_RV_PACING_PULSEWIDTH: 0.4
MDC_IDC_SET_LEADCHNL_RV_SENSING_POLARITY: NORMAL
MDC_IDC_SET_LEADCHNL_RV_SENSING_SENSITIVITY: 0.6
MDC_IDC_SET_ZONE_STATUS: NORMAL
MDC_IDC_SET_ZONE_TYPE: NORMAL
MDC_IDC_STAT_AT_BURDEN_PERCENT: 1
MDC_IDC_STAT_BRADY_RA_PERCENT_PACED: 9
MDC_IDC_STAT_BRADY_RV_PERCENT_PACED: 0

## 2025-08-13 LAB
MC_CV_MDC_IDC_RATE_1: 160
MC_CV_MDC_IDC_ZONE_ID: 1
MDC_IDC_MSMT_BATTERY_REMAINING_LONGEVITY: 78 MO
MDC_IDC_MSMT_BATTERY_REMAINING_PERCENTAGE: 100 %
MDC_IDC_MSMT_BATTERY_STATUS: NORMAL
MDC_IDC_MSMT_LEADCHNL_RA_DTM: NORMAL
MDC_IDC_MSMT_LEADCHNL_RA_IMPEDANCE_VALUE: 491
MDC_IDC_MSMT_LEADCHNL_RA_PACING_THRESHOLD_AMPLITUDE: 0.6
MDC_IDC_MSMT_LEADCHNL_RA_PACING_THRESHOLD_POLARITY: NORMAL
MDC_IDC_MSMT_LEADCHNL_RA_PACING_THRESHOLD_PULSEWIDTH: 0.4
MDC_IDC_MSMT_LEADCHNL_RA_SENSING_INTR_AMPL: 4.9
MDC_IDC_MSMT_LEADCHNL_RV_DTM: NORMAL
MDC_IDC_MSMT_LEADCHNL_RV_IMPEDANCE_VALUE: 616
MDC_IDC_MSMT_LEADCHNL_RV_PACING_THRESHOLD_AMPLITUDE: 0.9
MDC_IDC_MSMT_LEADCHNL_RV_PACING_THRESHOLD_POLARITY: NORMAL
MDC_IDC_MSMT_LEADCHNL_RV_PACING_THRESHOLD_PULSEWIDTH: 0.4
MDC_IDC_MSMT_LEADCHNL_RV_SENSING_INTR_AMPL: 3.3
MDC_IDC_PG_IMPLANT_DTM: NORMAL
MDC_IDC_PG_MFG: NORMAL
MDC_IDC_PG_MODEL: NORMAL
MDC_IDC_PG_SERIAL: NORMAL
MDC_IDC_PG_TYPE: NORMAL
MDC_IDC_SESS_DTM: NORMAL
MDC_IDC_SESS_TYPE: NORMAL
MDC_IDC_SET_BRADY_AT_MODE_SWITCH_RATE: 170
MDC_IDC_SET_BRADY_LOWRATE: 60
MDC_IDC_SET_BRADY_MAX_SENSOR_RATE: 120
MDC_IDC_SET_BRADY_MAX_TRACKING_RATE: 120
MDC_IDC_SET_BRADY_MODE: NORMAL
MDC_IDC_SET_BRADY_PAV_DELAY: 250
MDC_IDC_SET_BRADY_SAV_DELAY: 250
MDC_IDC_SET_LEADCHNL_RA_PACING_AMPLITUDE: 2.2
MDC_IDC_SET_LEADCHNL_RA_PACING_POLARITY: NORMAL
MDC_IDC_SET_LEADCHNL_RA_PACING_PULSEWIDTH: 0.4
MDC_IDC_SET_LEADCHNL_RA_SENSING_POLARITY: NORMAL
MDC_IDC_SET_LEADCHNL_RA_SENSING_SENSITIVITY: 0.25
MDC_IDC_SET_LEADCHNL_RV_PACING_AMPLITUDE: 2.2
MDC_IDC_SET_LEADCHNL_RV_PACING_POLARITY: NORMAL
MDC_IDC_SET_LEADCHNL_RV_PACING_PULSEWIDTH: 0.4
MDC_IDC_SET_LEADCHNL_RV_SENSING_POLARITY: NORMAL
MDC_IDC_SET_LEADCHNL_RV_SENSING_SENSITIVITY: 0.6
MDC_IDC_SET_ZONE_STATUS: NORMAL
MDC_IDC_SET_ZONE_TYPE: NORMAL
MDC_IDC_STAT_AT_BURDEN_PERCENT: 1
MDC_IDC_STAT_BRADY_RA_PERCENT_PACED: 9
MDC_IDC_STAT_BRADY_RV_PERCENT_PACED: 0

## (undated) DEVICE — Device

## (undated) DEVICE — CATH DIAG IMPULSE FL4 6F 100CM

## (undated) DEVICE — 3M™ STERI-STRIP™ REINFORCED ADHESIVE SKIN CLOSURES, R1547, 1/2 IN X 4 IN (12 MM X 100 MM), 6 STRIPS/ENVELOPE: Brand: 3M™ STERI-STRIP™

## (undated) DEVICE — NDL PERC 1PRT THNWALL W/BASEPLT 18G 7CM

## (undated) DEVICE — 3M™ IOBAN™ 2 ANTIMICROBIAL INCISE DRAPE 6650EZ: Brand: IOBAN™ 2

## (undated) DEVICE — UNDYED BRAIDED (POLYGLACTIN 910), SYNTHETIC ABSORBABLE SUTURE: Brand: COATED VICRYL

## (undated) DEVICE — INTRO SHEATH PRELUDE SNAP .038 6F 13CM W/SDPRT

## (undated) DEVICE — CATH DIAG IMPULSE PIG .056 6F 110CM

## (undated) DEVICE — GW RUNTHROUGH NS HYPERCOAT .014 3X180CM

## (undated) DEVICE — ELECTRD DEFIB M/FUNC PROPADZ RADIOL 2PK

## (undated) DEVICE — CATH DIAG IMPULSE FR4 6F 100CM

## (undated) DEVICE — PINNACLE INTRODUCER SHEATH: Brand: PINNACLE

## (undated) DEVICE — DEV INFL COMPAK W/ACCESSPLUS IN4530

## (undated) DEVICE — SUT SILK 2/0 FS BLK 18IN 685G

## (undated) DEVICE — PK TRY HEART CATH 50

## (undated) DEVICE — DGW .035 FC J3MM 150CM TEF HEP: Brand: EMERALD

## (undated) DEVICE — ST ACC MICROPUNCTURE STFF/CANN PLAT/TP 4F 21G 40CM

## (undated) DEVICE — CABL BIPOL W/ALLGTR CLIP/SM 12FT

## (undated) DEVICE — BALN EUPHORA 2.25X15MM

## (undated) DEVICE — VIOLET BRAIDED (POLYGLACTIN 910), SYNTHETIC ABSORBABLE SUTURE: Brand: COATED VICRYL

## (undated) DEVICE — 3M™ PATIENT PLATE, CORDED, SPLIT, LARGE, 40 PER CASE, 1179: Brand: 3M™

## (undated) DEVICE — PENCL HND ROCKRSWTCH HOLSTR EZ CLEAN TP CRD 10FT

## (undated) DEVICE — SUT ETHIB 0/0 MO6 I8IN CX45D

## (undated) DEVICE — PACEMAKER CDS: Brand: MEDLINE INDUSTRIES, INC.

## (undated) DEVICE — 6F .070 XB 3.5 ECO PK: Brand: VISTA BRITE TIP